# Patient Record
Sex: FEMALE | Race: WHITE | Employment: FULL TIME | URBAN - METROPOLITAN AREA
[De-identification: names, ages, dates, MRNs, and addresses within clinical notes are randomized per-mention and may not be internally consistent; named-entity substitution may affect disease eponyms.]

---

## 2017-01-13 RX ORDER — MECLIZINE HYDROCHLORIDE 25 MG/1
TABLET ORAL
Qty: 30 TABLET | Refills: 0 | Status: SHIPPED | OUTPATIENT
Start: 2017-01-13 | End: 2017-05-30

## 2017-01-13 NOTE — TELEPHONE ENCOUNTER
Received fax refill request for Meclizine   LOV: 6/13/16- [address] [facility] [address]  Last labs: 6/21/16  Last rx: 12/5/16- 30 with 0  Future Appointments  Date Time Provider Lluvia Irene   1/23/2017 9:00 AM Lia Gomez, DO [facility]     Pt has upcoming batsheva

## 2017-01-23 ENCOUNTER — OFFICE VISIT (OUTPATIENT)
Dept: INTERNAL MEDICINE CLINIC | Facility: CLINIC | Age: 72
End: 2017-01-23

## 2017-01-23 VITALS
TEMPERATURE: 98 F | BODY MASS INDEX: 19.14 KG/M2 | SYSTOLIC BLOOD PRESSURE: 102 MMHG | WEIGHT: 108 LBS | DIASTOLIC BLOOD PRESSURE: 58 MMHG | HEART RATE: 60 BPM | RESPIRATION RATE: 16 BRPM | HEIGHT: 62.8 IN

## 2017-01-23 DIAGNOSIS — I10 ESSENTIAL HYPERTENSION: Primary | ICD-10-CM

## 2017-01-23 DIAGNOSIS — E55.9 VITAMIN D DEFICIENCY: ICD-10-CM

## 2017-01-23 PROCEDURE — 99213 OFFICE O/P EST LOW 20 MIN: CPT | Performed by: FAMILY MEDICINE

## 2017-01-23 NOTE — PROGRESS NOTES
HPI:    Patient ID: Qasim Benites is a 74year old female. HPI Here for BP check. Patient has no complaints. Taking BP med as prescribed with no issues. Patient notes her BP is checked at Podiatrist as well and typically is 110s/70-80s.  No feelings Ear: Tympanic membrane, external ear and ear canal normal.   Mouth/Throat: Mucous membranes are normal. No posterior oropharyngeal erythema. Eyes: Conjunctivae are normal. Pupils are equal, round, and reactive to light. Neck: No thyromegaly present.

## 2017-01-23 NOTE — PATIENT INSTRUCTIONS
Step-by-Step  Checking Your Blood Pressure    Date Last Reviewed: 4/27/2016  © 9992-5583 [address], [facility] [employer]. All rights reserved.  This information is not intended as a substitute for professional medical

## 2017-01-26 ENCOUNTER — HOSPITAL ENCOUNTER (EMERGENCY)
Facility: HOSPITAL | Age: 72
Discharge: HOME | End: 2017-01-26
Attending: EMERGENCY MEDICINE
Payer: MEDICARE

## 2017-01-26 ENCOUNTER — APPOINTMENT (OUTPATIENT)
Dept: CT IMAGING | Facility: HOSPITAL | Age: 72
End: 2017-01-26
Attending: EMERGENCY MEDICINE
Payer: MEDICARE

## 2017-01-26 VITALS
SYSTOLIC BLOOD PRESSURE: 121 MMHG | HEART RATE: 81 BPM | OXYGEN SATURATION: 98 % | TEMPERATURE: 99 F | WEIGHT: 108 LBS | HEIGHT: 64 IN | BODY MASS INDEX: 18.44 KG/M2 | RESPIRATION RATE: 14 BRPM | DIASTOLIC BLOOD PRESSURE: 83 MMHG

## 2017-01-26 DIAGNOSIS — S09.90XA HEAD INJURY, INITIAL ENCOUNTER: ICD-10-CM

## 2017-01-26 DIAGNOSIS — R42 VERTIGO: Primary | ICD-10-CM

## 2017-01-26 LAB
ALBUMIN SERPL-MCNC: 3.7 G/DL (ref 3.5–4.8)
ALP LIVER SERPL-CCNC: 74 U/L (ref 55–142)
ALT SERPL-CCNC: 31 U/L (ref 14–54)
AST SERPL-CCNC: 28 U/L (ref 15–41)
ATRIAL RATE: 71 BPM
BASOPHILS # BLD AUTO: 0.08 X10(3) UL (ref 0–0.1)
BASOPHILS NFR BLD AUTO: 1.3 %
BILIRUB SERPL-MCNC: 0.3 MG/DL (ref 0.1–2)
BUN BLD-MCNC: 17 MG/DL (ref 8–20)
CALCIUM BLD-MCNC: 9 MG/DL (ref 8.3–10.3)
CHLORIDE: 107 MMOL/L (ref 101–111)
CO2: 24 MMOL/L (ref 22–32)
CREAT BLD-MCNC: 0.89 MG/DL (ref 0.55–1.02)
EOSINOPHIL # BLD AUTO: 0.12 X10(3) UL (ref 0–0.3)
EOSINOPHIL NFR BLD AUTO: 2 %
ERYTHROCYTE [DISTWIDTH] IN BLOOD BY AUTOMATED COUNT: 12.6 % (ref 11.5–16)
GLUCOSE BLD-MCNC: 129 MG/DL (ref 70–99)
HCT VFR BLD AUTO: 36.5 % (ref 34–50)
HGB BLD-MCNC: 12.3 G/DL (ref 12–16)
IMMATURE GRANULOCYTE COUNT: 0.01 X10(3) UL (ref 0–1)
IMMATURE GRANULOCYTE RATIO %: 0.2 %
LYMPHOCYTES # BLD AUTO: 1.3 X10(3) UL (ref 0.9–4)
LYMPHOCYTES NFR BLD AUTO: 21.1 %
M PROTEIN MFR SERPL ELPH: 7 G/DL (ref 6.1–8.3)
MCH RBC QN AUTO: 32.5 PG (ref 27–33.2)
MCHC RBC AUTO-ENTMCNC: 33.7 G/DL (ref 31–37)
MCV RBC AUTO: 96.6 FL (ref 81–100)
MONOCYTES # BLD AUTO: 0.42 X10(3) UL (ref 0.1–0.6)
MONOCYTES NFR BLD AUTO: 6.8 %
NEUTROPHIL ABS PRELIM: 4.22 X10 (3) UL (ref 1.3–6.7)
NEUTROPHILS # BLD AUTO: 4.22 X10(3) UL (ref 1.3–6.7)
NEUTROPHILS NFR BLD AUTO: 68.6 %
P AXIS: 57 DEGREES
P-R INTERVAL: 142 MS
PLATELET # BLD AUTO: 256 10(3)UL (ref 150–450)
POTASSIUM SERPL-SCNC: 4 MMOL/L (ref 3.6–5.1)
Q-T INTERVAL: 420 MS
QRS DURATION: 86 MS
QTC CALCULATION (BEZET): 456 MS
R AXIS: 50 DEGREES
RBC # BLD AUTO: 3.78 X10(6)UL (ref 3.8–5.1)
RED CELL DISTRIBUTION WIDTH-SD: 44.4 FL (ref 35.1–46.3)
SODIUM SERPL-SCNC: 141 MMOL/L (ref 136–144)
T AXIS: 21 DEGREES
VENTRICULAR RATE: 71 BPM
WBC # BLD AUTO: 6.2 X10(3) UL (ref 4–13)

## 2017-01-26 PROCEDURE — 99284 EMERGENCY DEPT VISIT MOD MDM: CPT

## 2017-01-26 PROCEDURE — 70450 CT HEAD/BRAIN W/O DYE: CPT

## 2017-01-26 PROCEDURE — 93005 ELECTROCARDIOGRAM TRACING: CPT

## 2017-01-26 PROCEDURE — 85025 COMPLETE CBC W/AUTO DIFF WBC: CPT | Performed by: EMERGENCY MEDICINE

## 2017-01-26 PROCEDURE — 93010 ELECTROCARDIOGRAM REPORT: CPT

## 2017-01-26 PROCEDURE — 36415 COLL VENOUS BLD VENIPUNCTURE: CPT

## 2017-01-26 PROCEDURE — 99285 EMERGENCY DEPT VISIT HI MDM: CPT

## 2017-01-26 PROCEDURE — 80053 COMPREHEN METABOLIC PANEL: CPT | Performed by: EMERGENCY MEDICINE

## 2017-01-26 RX ORDER — MECLIZINE HYDROCHLORIDE 25 MG/1
25 TABLET ORAL 3 TIMES DAILY PRN
Qty: 20 TABLET | Refills: 0 | Status: SHIPPED | OUTPATIENT
Start: 2017-01-26 | End: 2017-04-19

## 2017-01-26 NOTE — ED PROVIDER NOTES
Patient Seen in: [facility] Emergency Department    History   Patient presents with:  Contusion (musculoskeletal)    Stated Complaint: fell and hit head    HPI    This is a 10-year-old female who states that she left work early because of feeling di Oral Tab,  Take 1 tablet by mouth daily. Raloxifene HCl (EVISTA) 60 MG Oral Tab,  Take 1 tablet by mouth daily.        Family History   Problem Relation Age of Onset   • Diabetes, type 2 [Other] [OTHER] Father    • Lung cancer [Other] [OTHER] Mother    • And the patient is neurologically intact with no focal findings. The patient has a normal gait normal muscle strength normal sensory exam she is otherwise neurovascular intact cranial nerves grossly intact she has no facial droop.        ED Course     Labs instructions.       Disposition and Plan     Clinical Impression:  Vertigo  (primary encounter diagnosis)  Head injury, initial encounter    Disposition:  Discharge    Follow-up:  Richard Le, [address], [address]  [address] [place] [phone]

## 2017-01-26 NOTE — ED INITIAL ASSESSMENT (HPI)
PT STATES LEFT WORK EARLY TODAY DUE TO FEELING DIZZY. PT STATES TRIPPED OVER A CURB AND FELL STRIKING HER HEAD ON THE PAVEMENT. PT DENIES C/O SYNCOPE/LOC. PT HAS A H/O VERTIGO AND TAKES MECLIZINE.   PT HAS CONTUSION TO LEFT FOREHEAD, ALSO C/O PAIN TO RIG

## 2017-02-01 RX ORDER — LISINOPRIL 10 MG/1
10 TABLET ORAL
Qty: 90 TABLET | Refills: 1 | Status: SHIPPED | OUTPATIENT
Start: 2017-02-01 | End: 2017-05-30

## 2017-02-01 NOTE — TELEPHONE ENCOUNTER
Fax refill request received  LOV: 1/23/17- HTN  Last labs: 1/26/17- cmp/cbc  Last rx: 1/3/17- 30 with 0  No future appointments.   Per protocol to provider

## 2017-02-17 RX ORDER — OMEPRAZOLE 20 MG/1
20 CAPSULE, DELAYED RELEASE ORAL
Qty: 30 CAPSULE | Refills: 2 | Status: SHIPPED | OUTPATIENT
Start: 2017-02-17 | End: 2017-05-30

## 2017-02-17 NOTE — TELEPHONE ENCOUNTER
Received fax refill request from [facility] [address]: 1/23/17- HTN   Last labs: 1/26/17- cbc/cmp  Last rx: 11/11/160 30 with 2   Per protocol to provider  Pended as same   No future appointments.

## 2017-04-19 RX ORDER — MECLIZINE HYDROCHLORIDE 25 MG/1
25 TABLET ORAL 3 TIMES DAILY PRN
Qty: 20 TABLET | Refills: 0 | Status: SHIPPED | OUTPATIENT
Start: 2017-04-19 | End: 2017-05-30

## 2017-04-19 NOTE — TELEPHONE ENCOUNTER
LOV: 1/23/17 w/ AS  FOV: NFV  Last labs: 1/26/17 CMP,CBC  Last Refill: 1/26/17 qt:20  Per protocol sent to provider

## 2017-04-24 ENCOUNTER — TELEPHONE (OUTPATIENT)
Dept: INTERNAL MEDICINE CLINIC | Facility: CLINIC | Age: 72
End: 2017-04-24

## 2017-05-30 ENCOUNTER — OFFICE VISIT (OUTPATIENT)
Dept: INTERNAL MEDICINE CLINIC | Facility: CLINIC | Age: 72
End: 2017-05-30

## 2017-05-30 ENCOUNTER — APPOINTMENT (OUTPATIENT)
Dept: LAB | Age: 72
End: 2017-05-30
Attending: FAMILY MEDICINE
Payer: MEDICARE

## 2017-05-30 VITALS
DIASTOLIC BLOOD PRESSURE: 60 MMHG | TEMPERATURE: 99 F | RESPIRATION RATE: 18 BRPM | HEIGHT: 64 IN | SYSTOLIC BLOOD PRESSURE: 116 MMHG | BODY MASS INDEX: 18.44 KG/M2 | HEART RATE: 63 BPM | WEIGHT: 108 LBS

## 2017-05-30 DIAGNOSIS — I10 ESSENTIAL HYPERTENSION: ICD-10-CM

## 2017-05-30 DIAGNOSIS — E55.9 VITAMIN D DEFICIENCY: ICD-10-CM

## 2017-05-30 DIAGNOSIS — S90.412A ABRASION, LEFT GREAT TOE, INITIAL ENCOUNTER: Primary | ICD-10-CM

## 2017-05-30 PROCEDURE — 82306 VITAMIN D 25 HYDROXY: CPT

## 2017-05-30 PROCEDURE — 99214 OFFICE O/P EST MOD 30 MIN: CPT | Performed by: FAMILY MEDICINE

## 2017-05-30 PROCEDURE — 80053 COMPREHEN METABOLIC PANEL: CPT

## 2017-05-30 RX ORDER — LISINOPRIL 10 MG/1
10 TABLET ORAL
Qty: 90 TABLET | Refills: 1 | Status: SHIPPED | OUTPATIENT
Start: 2017-05-30 | End: 2017-11-26

## 2017-05-30 RX ORDER — RALOXIFENE HYDROCHLORIDE 60 MG/1
TABLET, FILM COATED ORAL
Qty: 90 TABLET | Refills: 3 | Status: SHIPPED | OUTPATIENT
Start: 2017-05-30 | End: 2018-04-23

## 2017-05-30 RX ORDER — MECLIZINE HYDROCHLORIDE 25 MG/1
25 TABLET ORAL 3 TIMES DAILY PRN
Qty: 20 TABLET | Refills: 0 | Status: SHIPPED | OUTPATIENT
Start: 2017-05-30 | End: 2017-07-24

## 2017-05-30 RX ORDER — OMEPRAZOLE 20 MG/1
20 CAPSULE, DELAYED RELEASE ORAL
Qty: 30 CAPSULE | Refills: 2 | Status: SHIPPED | OUTPATIENT
Start: 2017-05-30 | End: 2017-10-26

## 2017-05-30 NOTE — TELEPHONE ENCOUNTER
Last Office Visit: 1-23-17 with AMS for b/p   Last Rx Filled: 1-3-17 30g with no refills   Last Labs: 1-26-17 cbc/cmp  Future Appointment: 6-12-17     Per protocol to provider

## 2017-05-30 NOTE — PATIENT INSTRUCTIONS
Abrasions  Abrasions are skin scrapes. Their treatment depends on how large and deep the abrasion is. Home care  You may be prescribed an antibiotic cream or ointment to apply to the wound. This helps prevent infection.  Follow instructions when using th © 7938-1879 [address], [address]. All rights reserved. This information is not intended as a substitute for professional medical care. Always follow your healthcare professional's instructions.

## 2017-05-30 NOTE — PROGRESS NOTES
HPI:    Patient ID: Becca Helms is a 74year old female. HPI Here for f/u on wound she got at work 4 days ago. Patient slipped and fell and caught her left first toe on the edge of an escalator.  Had it cleaned at work and has been cleaning twice Abrasion, left great toe, initial encounter  (primary encounter diagnosis)  1. BP at goal. Continue current medication. Check labs. 2. Check labs. 3. Continue keeping clean and dry. Keep open while at home and covered while out.  Watch for signs of infe

## 2017-06-12 ENCOUNTER — OFFICE VISIT (OUTPATIENT)
Dept: INTERNAL MEDICINE CLINIC | Facility: CLINIC | Age: 72
End: 2017-06-12

## 2017-06-12 VITALS
RESPIRATION RATE: 16 BRPM | SYSTOLIC BLOOD PRESSURE: 126 MMHG | WEIGHT: 108 LBS | HEIGHT: 64 IN | DIASTOLIC BLOOD PRESSURE: 88 MMHG | HEART RATE: 68 BPM | BODY MASS INDEX: 18.44 KG/M2 | TEMPERATURE: 98 F

## 2017-06-12 DIAGNOSIS — Z12.31 VISIT FOR SCREENING MAMMOGRAM: ICD-10-CM

## 2017-06-12 DIAGNOSIS — Z12.11 SCREENING FOR MALIGNANT NEOPLASM OF COLON: Primary | ICD-10-CM

## 2017-06-12 DIAGNOSIS — Z00.00 ENCOUNTER FOR ANNUAL HEALTH EXAMINATION: ICD-10-CM

## 2017-06-12 DIAGNOSIS — I10 ESSENTIAL HYPERTENSION: ICD-10-CM

## 2017-06-12 DIAGNOSIS — E55.9 VITAMIN D DEFICIENCY: ICD-10-CM

## 2017-06-12 DIAGNOSIS — Z78.0 POSTMENOPAUSAL: ICD-10-CM

## 2017-06-12 DIAGNOSIS — M81.0 OSTEOPOROSIS WITHOUT CURRENT PATHOLOGICAL FRACTURE, UNSPECIFIED OSTEOPOROSIS TYPE: ICD-10-CM

## 2017-06-12 PROCEDURE — G0439 PPPS, SUBSEQ VISIT: HCPCS | Performed by: FAMILY MEDICINE

## 2017-06-12 NOTE — PATIENT INSTRUCTIONS
Liliana Mei's SCREENING SCHEDULE   Tests on this list are recommended by your physician but may not be covered, or covered at this frequency, by your insurer. Please check with your insurance carrier before scheduling to verify coverage.    PREVENT lifetime   • Anyone with a family history    Colorectal Cancer Screening  Covered up to Age 76     Colonoscopy Screen   Covered every 10 years- more often if abnormal Colonoscopy,10 Years due on 01/06/1993 Update Health Maintenance if applicable    Flex Si orders found for this or any previous visit.  Please get once after your 65th birthday    Pneumococcal 23 (Pneumovax)  Covered Once after 65   Orders placed or performed in visit on 06/13/16  -PNEUMOCOCCAL IMM (PNEUMOVAX)    Please get once after your 65th insurance carrier before scheduling to verify coverage.    PREVENTATIVE SERVICES  INDICATIONS AND SCHEDULE Internal Lab or Procedure External Lab or Procedure   Diabetes Screening      HbgA1C    At Least  Annually for Diabetics No results found for: A1C No due on 01/06/1993 Update Health Maintenance if applicable    Flex Sigmoidoscopy Screen  Covered every 5 years No results found for this or any previous visit. No flowsheet data found.      Fecal Occult Blood   Covered Annually No results found for: FOB, OCC 06/13/16  -PNEUMOCOCCAL IMM (PNEUMOVAX)    Please get once after your 65th birthday    Hepatitis B for Moderate/High Risk       No orders found for this or any previous visit.  Medium/high risk factors:   End-stage renal disease   Hemophiliacs who received

## 2017-06-12 NOTE — PROGRESS NOTES
HPI:   Israel Lewis is a 74year old female who presents for a Medicare Subsequent Annual Wellness visit (Pt already had Initial Annual Wellness). Here for follow-up. No complaints. Taking all meds as prescribed with no issues.      Annual Physic removal of ovarian cyst(s); colonoscopy; and tonsillectomy. Her family history includes Diabetes, type 2 in her father; Heart Disease in her mother; Lung cancer in her mother. SOCIAL HISTORY:   She  reports that she has never smoked.  She does not have bruit or JVD   Back:   Symmetric, no curvature, ROM normal, no CVA tenderness   Lungs:   Clear to auscultation bilaterally, respirations unlabored   Heart:  Regular rate and rhythm, S1 and S2 normal, no murmur, rub, or gallop   Abdomen:   Soft, non-tender, aspirin therapy.    Alberto Tolbert is unable to use daily aspirin therapy For the following reasons:   Patient decided that the risks of aspirin therapy outweigh the benefits and declines aspirin therapy          Diet assessment: good     Advanced Direc Problems?: No     Functional Status     Hearing Problems?: No    Vision Problems? : No    Difficulty walking?: No    Difficulty dressing or bathing?: No    Problems with daily activities? : No    Memory Problems?: No      Fall/Risk Assessment     Do you ha 06/21/2016 54     LDL CHOLESTROL (mg/dL)   Date Value   06/10/2014 50        EKG - w/ Initial Preventative Physical Exam only, or if medically necessary Electrocardiogram date01/26/2017       Colorectal Cancer Screening      Colonoscopy Screen every 10 y covered with a cut with metal- TD and TDaP Not covered by Medicare Part B No vaccine history found This may be covered with your prescription benefits, but Medicare does not cover unless Medically needed    Zoster  Not covered by Medicare Part B No vaccine

## 2017-06-19 ENCOUNTER — HOSPITAL ENCOUNTER (OUTPATIENT)
Dept: BONE DENSITY | Age: 72
Discharge: HOME | End: 2017-06-19
Attending: FAMILY MEDICINE
Payer: MEDICARE

## 2017-06-19 ENCOUNTER — HOSPITAL ENCOUNTER (OUTPATIENT)
Dept: MAMMOGRAPHY | Age: 72
Discharge: HOME | End: 2017-06-19
Attending: FAMILY MEDICINE
Payer: MEDICARE

## 2017-06-19 DIAGNOSIS — Z00.00 ENCOUNTER FOR ANNUAL HEALTH EXAMINATION: ICD-10-CM

## 2017-06-19 DIAGNOSIS — Z78.0 POSTMENOPAUSAL: ICD-10-CM

## 2017-06-19 DIAGNOSIS — Z12.31 VISIT FOR SCREENING MAMMOGRAM: ICD-10-CM

## 2017-06-19 PROCEDURE — 77080 DXA BONE DENSITY AXIAL: CPT | Performed by: FAMILY MEDICINE

## 2017-06-19 PROCEDURE — 77067 SCR MAMMO BI INCL CAD: CPT | Performed by: FAMILY MEDICINE

## 2017-07-12 ENCOUNTER — TELEPHONE (OUTPATIENT)
Dept: INTERNAL MEDICINE CLINIC | Facility: CLINIC | Age: 72
End: 2017-07-12

## 2017-07-14 NOTE — TELEPHONE ENCOUNTER
Please call patient and get more information as to what this is regarding and why I need to write a letter rather than the  wanting medical records release of those two visits since I was not the person patient saw when she went to the ER those times

## 2017-07-19 NOTE — TELEPHONE ENCOUNTER
Pt states that she was in ED on 1/9/15 and 6/19/15 both episodes were  related to falls at her condo/living appt. Pt states that Medicare is trying to get paid for ALL of Pt's visits with AMS  which are not related to falls.  Medicare is trying to claim th

## 2017-07-21 NOTE — TELEPHONE ENCOUNTER
Please print off all of my office visits from 1/21/15 through the present. Have patient sign medical release form so we can send those notes to the . Then go ahead and send notes (there should be 10 visits I think).  Please let patient know that there

## 2017-07-24 NOTE — TELEPHONE ENCOUNTER
Spoke to patient and informed that we will have office visits printed and she will need to sign a release form. Pt stated she will be in this morning.  I have printed records

## 2017-07-25 RX ORDER — MECLIZINE HYDROCHLORIDE 25 MG/1
TABLET ORAL
Qty: 20 TABLET | Refills: 0 | Status: SHIPPED | OUTPATIENT
Start: 2017-07-25 | End: 2017-09-07

## 2017-07-25 RX ORDER — DESONIDE 0.5 MG/ML
LOTION TOPICAL
Qty: 118 ML | Refills: 1 | Status: SHIPPED | OUTPATIENT
Start: 2017-07-25 | End: 2017-11-26

## 2017-07-25 NOTE — TELEPHONE ENCOUNTER
LOV: 6/12/17  Future office visit: no upcoming visit   Last labs: 5/20/17 Vit D Cmp 1/26/17 Cbc   Last RX: desonide 1/3/17 #118 mL #2 Refill    Meclizine 5/30/17 #20 No refills  Per protocol: Route to provider

## 2017-08-01 ENCOUNTER — TELEPHONE (OUTPATIENT)
Dept: INTERNAL MEDICINE CLINIC | Facility: CLINIC | Age: 72
End: 2017-08-01

## 2017-08-01 NOTE — TELEPHONE ENCOUNTER
Please call patient. Please explain that unfortunately since I was not involved in the care she had in those other ER visits, I cannot comment on them.  She will need to get records of those visits from medical records and that should be enough as far as wh

## 2017-08-01 NOTE — TELEPHONE ENCOUNTER
LM for patient informing her that [facility] is not able to write a letter in regards to the ER visits. I did let her know she could contact the hospital medical records and get the records for the visits in question.      I let patient know I would be out of the

## 2017-08-01 NOTE — TELEPHONE ENCOUNTER
AMS,   See note attached from patient. She is now requesting a letter to state that the ER trips in 2016 and 2017 are not related to to the falls which required visits in 2015. Please advise.

## 2017-09-07 RX ORDER — MECLIZINE HYDROCHLORIDE 25 MG/1
TABLET ORAL
Qty: 20 TABLET | Refills: 0 | Status: SHIPPED | OUTPATIENT
Start: 2017-09-07 | End: 2017-10-02

## 2017-09-07 NOTE — TELEPHONE ENCOUNTER
LOV: 6/12/17  Future office visit: No upcoming visit  Last labs: 5/30/17 Vit D Cmp 1/26/17 Cbc Cmp   Last RX: 7/25/17 #30 No Refills  Per protocol: Route to provider

## 2017-10-02 RX ORDER — MECLIZINE HYDROCHLORIDE 25 MG/1
TABLET ORAL
Qty: 20 TABLET | Refills: 0 | Status: SHIPPED | OUTPATIENT
Start: 2017-10-02 | End: 2018-01-22

## 2017-10-26 RX ORDER — MECLIZINE HYDROCHLORIDE 25 MG/1
TABLET ORAL
Qty: 20 TABLET | Refills: 0 | Status: SHIPPED | OUTPATIENT
Start: 2017-10-26 | End: 2017-11-26

## 2017-10-26 RX ORDER — OMEPRAZOLE 20 MG/1
CAPSULE, DELAYED RELEASE ORAL
Qty: 30 CAPSULE | Refills: 1 | Status: SHIPPED | OUTPATIENT
Start: 2017-10-26 | End: 2017-12-27

## 2017-10-26 NOTE — TELEPHONE ENCOUNTER
LOV-6/12/17  FOV-none  LAST RX-omeprazole 5/30/17 30 tabs 2 refills meclizine 10/02/17 20 tabs 0 refills  LAST LABS-5/30/17  PER PROTOCOL-to provider

## 2017-11-27 RX ORDER — DESONIDE 0.5 MG/ML
LOTION TOPICAL
Qty: 118 ML | Refills: 0 | Status: SHIPPED | OUTPATIENT
Start: 2017-11-27 | End: 2018-04-23

## 2017-11-27 RX ORDER — LISINOPRIL 10 MG/1
TABLET ORAL
Qty: 90 TABLET | Refills: 0 | Status: SHIPPED | OUTPATIENT
Start: 2017-11-27 | End: 2018-04-23

## 2017-11-27 RX ORDER — MECLIZINE HYDROCHLORIDE 25 MG/1
TABLET ORAL
Qty: 20 TABLET | Refills: 0 | Status: SHIPPED | OUTPATIENT
Start: 2017-11-27 | End: 2018-01-25

## 2017-11-27 NOTE — TELEPHONE ENCOUNTER
Medication(s) to Refill:   Pending Prescriptions Disp Refills    LISINOPRIL 10 MG Oral Tab [Pharmacy Med Name: Lisinopril Oral Tablet 10 MG] 90 tablet 0     Sig: TAKE ONE TABLET BY MOUTH ONE TIME DAILY       DESONIDE 0.05 % External Lotion [Pharmacy Med Na

## 2017-12-27 RX ORDER — OMEPRAZOLE 20 MG/1
CAPSULE, DELAYED RELEASE ORAL
Qty: 30 CAPSULE | Refills: 5 | Status: SHIPPED | OUTPATIENT
Start: 2017-12-27 | End: 2018-04-23

## 2017-12-27 NOTE — TELEPHONE ENCOUNTER
E request  Medication(s) to Refill:   Pending Prescriptions Disp Refills    OMEPRAZOLE 20 MG Oral Capsule Delayed Release [Pharmacy Med Name: Omeprazole Oral Capsule Delayed Release 20 MG] 30 capsule 0     Sig: take 1 capsule by mouth every morning before

## 2018-01-22 ENCOUNTER — OFFICE VISIT (OUTPATIENT)
Dept: INTERNAL MEDICINE CLINIC | Facility: CLINIC | Age: 73
End: 2018-01-22

## 2018-01-22 VITALS
HEART RATE: 70 BPM | DIASTOLIC BLOOD PRESSURE: 60 MMHG | TEMPERATURE: 98 F | SYSTOLIC BLOOD PRESSURE: 110 MMHG | BODY MASS INDEX: 18.61 KG/M2 | WEIGHT: 109 LBS | HEIGHT: 64 IN | RESPIRATION RATE: 16 BRPM

## 2018-01-22 DIAGNOSIS — M81.0 OSTEOPOROSIS WITHOUT CURRENT PATHOLOGICAL FRACTURE, UNSPECIFIED OSTEOPOROSIS TYPE: ICD-10-CM

## 2018-01-22 DIAGNOSIS — R42 VERTIGO: ICD-10-CM

## 2018-01-22 DIAGNOSIS — Z12.11 SCREENING FOR MALIGNANT NEOPLASM OF COLON: ICD-10-CM

## 2018-01-22 DIAGNOSIS — I10 ESSENTIAL HYPERTENSION: Primary | ICD-10-CM

## 2018-01-22 DIAGNOSIS — E55.9 VITAMIN D DEFICIENCY: ICD-10-CM

## 2018-01-22 PROCEDURE — 99214 OFFICE O/P EST MOD 30 MIN: CPT | Performed by: FAMILY MEDICINE

## 2018-01-22 NOTE — PROGRESS NOTES
HPI:    Patient ID: Lavell Bass is a 74year old female. HPI Here for f/u. Patient has no complaints. Taking Lisinopril as prescribed with no issues. Doesn't regularly check BP but no headaches or lightheadedness.    Has periodic vertigo still a Referral given for colonoscopy. Encouraged patient to schedule. 3. Reviewed previous labs. Recheck with yearly labs. Continue vitamin D.   4. Stable. Continue Raloxifene. 5. Symptom controlled with meclizine. Continue as needed.        No orders of the

## 2018-01-26 RX ORDER — MECLIZINE HYDROCHLORIDE 25 MG/1
TABLET ORAL
Qty: 20 TABLET | Refills: 2 | Status: SHIPPED | OUTPATIENT
Start: 2018-01-26 | End: 2018-04-23

## 2018-01-26 NOTE — TELEPHONE ENCOUNTER
E request  Medication(s) to Refill:   Pending Prescriptions Disp Refills    MECLIZINE HCL 25 MG Oral Tab [Pharmacy Med Name: Meclizine HCl Oral Tablet 25 MG] 20 tablet 0     Sig: TAKE ONE TABLET BY MOUTH THREE TIMES DAILY AS NEEDED            Last Time Med

## 2018-02-12 ENCOUNTER — TELEPHONE (OUTPATIENT)
Dept: INTERNAL MEDICINE CLINIC | Facility: CLINIC | Age: 73
End: 2018-02-12

## 2018-02-12 NOTE — TELEPHONE ENCOUNTER
Patient notified to go online, fill out form, bring in to our office and we send out for the kit. Pt verbalizes understanding.

## 2018-02-12 NOTE — TELEPHONE ENCOUNTER
She can go online on [employer] and fill out an order form- she fills this out and brings it in to our office and we send it in for the kit.

## 2018-02-12 NOTE — TELEPHONE ENCOUNTER
Patient heard that their is an alternative test to the colonoscopy other than the mail in card. She heard from a friend that there is a cone that you fit on your toliet and collects your stool and she is wondering if that would be available?   Does she nee

## 2018-02-19 ENCOUNTER — TELEPHONE (OUTPATIENT)
Dept: INTERNAL MEDICINE CLINIC | Facility: CLINIC | Age: 73
End: 2018-02-19

## 2018-02-19 NOTE — TELEPHONE ENCOUNTER
Received Pre-Op Paperwork Clearance. Faxed our Pre-Op Clearance Request to Dr. Virgen Tee office at [phone]. Paperwork in AMS folder.

## 2018-02-23 NOTE — TELEPHONE ENCOUNTER
Please call patient. She needs pre-op appt with me scheduled sometime between 4/16 and 5/7. No labs or EKG needed.

## 2018-02-27 NOTE — TELEPHONE ENCOUNTER
Patient is scheduled for Pre-Op.   Future Appointments  Date Time Provider Lluvia Maria Victoria   4/23/2018 9:00 AM Dhiraj Rocha DO [facility]   5/7/2018 9:00 AM Desean Juan MD [facility] SUB GI   6/18/2018 9:00 AM Dhiraj Rocha DO EMG 35 75

## 2018-04-11 ENCOUNTER — HOSPITAL ENCOUNTER (EMERGENCY)
Facility: HOSPITAL | Age: 73
Discharge: HOME | End: 2018-04-11
Attending: EMERGENCY MEDICINE
Payer: MEDICARE

## 2018-04-11 VITALS
BODY MASS INDEX: 17.93 KG/M2 | RESPIRATION RATE: 18 BRPM | HEART RATE: 69 BPM | HEIGHT: 64 IN | WEIGHT: 105 LBS | SYSTOLIC BLOOD PRESSURE: 133 MMHG | DIASTOLIC BLOOD PRESSURE: 82 MMHG | TEMPERATURE: 98 F | OXYGEN SATURATION: 100 %

## 2018-04-11 DIAGNOSIS — T18.108A FOREIGN BODY IN ESOPHAGUS, INITIAL ENCOUNTER: Primary | ICD-10-CM

## 2018-04-11 PROCEDURE — 96376 TX/PRO/DX INJ SAME DRUG ADON: CPT

## 2018-04-11 PROCEDURE — 96374 THER/PROPH/DIAG INJ IV PUSH: CPT

## 2018-04-11 PROCEDURE — 99284 EMERGENCY DEPT VISIT MOD MDM: CPT

## 2018-04-11 PROCEDURE — 96375 TX/PRO/DX INJ NEW DRUG ADDON: CPT

## 2018-04-11 RX ORDER — ONDANSETRON 2 MG/ML
4 INJECTION INTRAMUSCULAR; INTRAVENOUS EVERY 4 HOURS PRN
Status: DISCONTINUED | OUTPATIENT
Start: 2018-04-11 | End: 2018-04-11

## 2018-04-11 RX ORDER — MAGNESIUM HYDROXIDE/ALUMINUM HYDROXICE/SIMETHICONE 120; 1200; 1200 MG/30ML; MG/30ML; MG/30ML
30 SUSPENSION ORAL ONCE
Status: COMPLETED | OUTPATIENT
Start: 2018-04-11 | End: 2018-04-11

## 2018-04-12 NOTE — ED PROVIDER NOTES
Patient Seen in: [facility] Emergency Department    History   Patient presents with:  FB in Throat (GI, respiratory)    Stated Complaint: Foreign Body     HPI    Patient was eating an Asian chicken salad with peapod and feels that a peapod has been Abdomen: Positive bowel sounds,  soft, no tenderness, no distension, no masses, no organomegaly, no rebound, no guarding   Extremities: No cyanosis, no clubbing, no edema   Musculoskeletal: No tenderness, swelling, deformity   Skin: No significant lesion

## 2018-04-12 NOTE — ED NOTES
Patient updated with plan of care. Pt able to drink full glass of water without difficulty. Reports improvement of discomfort in throat. MD aware.

## 2018-04-12 NOTE — ED NOTES
Patient updated with plan of care. Pt given marysol ortiz per MD verbal order. Pt instructed to try to eat solid food. Pt verbalized understanding.

## 2018-04-12 NOTE — ED NOTES
Patient updated with plan of care. Pt able to eat crackers and drink more water without difficulty. Denies discomfort at this time.

## 2018-04-23 ENCOUNTER — OFFICE VISIT (OUTPATIENT)
Dept: INTERNAL MEDICINE CLINIC | Facility: CLINIC | Age: 73
End: 2018-04-23

## 2018-04-23 VITALS
BODY MASS INDEX: 19.56 KG/M2 | TEMPERATURE: 98 F | SYSTOLIC BLOOD PRESSURE: 100 MMHG | DIASTOLIC BLOOD PRESSURE: 60 MMHG | WEIGHT: 109 LBS | RESPIRATION RATE: 16 BRPM | HEART RATE: 70 BPM | HEIGHT: 62.6 IN

## 2018-04-23 DIAGNOSIS — Z12.11 SCREENING FOR MALIGNANT NEOPLASM OF COLON: ICD-10-CM

## 2018-04-23 DIAGNOSIS — L98.9 SKIN SORE: Primary | ICD-10-CM

## 2018-04-23 PROCEDURE — 99213 OFFICE O/P EST LOW 20 MIN: CPT | Performed by: FAMILY MEDICINE

## 2018-04-23 RX ORDER — RALOXIFENE HYDROCHLORIDE 60 MG/1
TABLET, FILM COATED ORAL
Qty: 90 TABLET | Refills: 3 | Status: SHIPPED | OUTPATIENT
Start: 2018-04-23 | End: 2019-06-10

## 2018-04-23 RX ORDER — MECLIZINE HYDROCHLORIDE 25 MG/1
TABLET ORAL
Qty: 20 TABLET | Refills: 2 | Status: SHIPPED | OUTPATIENT
Start: 2018-04-23 | End: 2018-06-18

## 2018-04-23 RX ORDER — LISINOPRIL 10 MG/1
10 TABLET ORAL
Qty: 90 TABLET | Refills: 0 | Status: SHIPPED | OUTPATIENT
Start: 2018-04-23 | End: 2018-06-18

## 2018-04-23 RX ORDER — OMEPRAZOLE 20 MG/1
CAPSULE, DELAYED RELEASE ORAL
Qty: 30 CAPSULE | Refills: 5 | Status: SHIPPED | OUTPATIENT
Start: 2018-04-23 | End: 2018-06-18

## 2018-04-23 RX ORDER — DESONIDE 0.5 MG/ML
LOTION TOPICAL
Qty: 118 ML | Refills: 0 | Status: SHIPPED | OUTPATIENT
Start: 2018-04-23 | End: 2018-06-18

## 2018-04-23 NOTE — PROGRESS NOTES
HPI:    Patient ID: Israel Lewis is a 74year old female. HPI Here with c/o sore that doesn't heal at right ear where it connects to her head near the ear lobe. Has periodically and then gets better. Puts lotion and neosporin on this.    Cancelled Skin sore  (primary encounter diagnosis)  1. Patient agrees to Fit testing. Envelope and instructions given. 2. Rx ointment morning and noon and hydrocortisone cream at night. Return if worsening or persists after one week of creams.      Orders Placed Th

## 2018-05-07 ENCOUNTER — HOSPITAL ENCOUNTER (EMERGENCY)
Facility: HOSPITAL | Age: 73
Discharge: HOME | End: 2018-05-07
Attending: EMERGENCY MEDICINE
Payer: MEDICARE

## 2018-05-07 VITALS
DIASTOLIC BLOOD PRESSURE: 77 MMHG | HEART RATE: 70 BPM | HEIGHT: 63 IN | TEMPERATURE: 98 F | BODY MASS INDEX: 18.61 KG/M2 | WEIGHT: 105 LBS | OXYGEN SATURATION: 98 % | SYSTOLIC BLOOD PRESSURE: 126 MMHG | RESPIRATION RATE: 18 BRPM

## 2018-05-07 DIAGNOSIS — R19.8 GLOBUS SENSATION: Primary | ICD-10-CM

## 2018-05-07 PROCEDURE — 99284 EMERGENCY DEPT VISIT MOD MDM: CPT

## 2018-05-07 PROCEDURE — 96374 THER/PROPH/DIAG INJ IV PUSH: CPT

## 2018-05-07 RX ORDER — MAGNESIUM HYDROXIDE/ALUMINUM HYDROXICE/SIMETHICONE 120; 1200; 1200 MG/30ML; MG/30ML; MG/30ML
30 SUSPENSION ORAL ONCE
Status: COMPLETED | OUTPATIENT
Start: 2018-05-07 | End: 2018-05-07

## 2018-05-07 NOTE — ED PROVIDER NOTES
Patient Seen in: [facility] Emergency Department    History   Patient presents with:  FB in Throat (GI, respiratory)    Stated Complaint: chicken stuck in throat    HPI    Patient is a 25-year-old female who presents stating she feels like she has a Eyes: Conjunctivae and EOM are normal. Pupils are equal, round, and reactive to light. Neck: Normal range of motion. Neck supple. Cardiovascular: Normal rate, regular rhythm and normal heart sounds.     Pulmonary/Chest: Effort normal and breath sounds

## 2018-05-07 NOTE — ED INITIAL ASSESSMENT (HPI)
Patient was eating lunch at approximately 1500 today when she felt a peapod lodge in her throat and she has been unable pass it down. Patient denies difficulty breathing. Denies nausea/vomiting. Patient relates pain with swallowing.  Patient in no acute dis

## 2018-06-18 ENCOUNTER — OFFICE VISIT (OUTPATIENT)
Dept: INTERNAL MEDICINE CLINIC | Facility: CLINIC | Age: 73
End: 2018-06-18

## 2018-06-18 VITALS
BODY MASS INDEX: 19.2 KG/M2 | SYSTOLIC BLOOD PRESSURE: 110 MMHG | HEIGHT: 62.5 IN | DIASTOLIC BLOOD PRESSURE: 62 MMHG | TEMPERATURE: 98 F | RESPIRATION RATE: 15 BRPM | WEIGHT: 107 LBS | HEART RATE: 60 BPM

## 2018-06-18 DIAGNOSIS — K21.9 GASTROESOPHAGEAL REFLUX DISEASE WITHOUT ESOPHAGITIS: ICD-10-CM

## 2018-06-18 DIAGNOSIS — R42 VERTIGO: ICD-10-CM

## 2018-06-18 DIAGNOSIS — I10 ESSENTIAL HYPERTENSION: ICD-10-CM

## 2018-06-18 DIAGNOSIS — E55.9 VITAMIN D DEFICIENCY: ICD-10-CM

## 2018-06-18 DIAGNOSIS — Z12.39 SCREENING FOR BREAST CANCER: ICD-10-CM

## 2018-06-18 DIAGNOSIS — M81.6 LOCALIZED OSTEOPOROSIS WITHOUT CURRENT PATHOLOGICAL FRACTURE: ICD-10-CM

## 2018-06-18 DIAGNOSIS — Z00.00 ENCOUNTER FOR ANNUAL HEALTH EXAMINATION: Primary | ICD-10-CM

## 2018-06-18 PROCEDURE — G0439 PPPS, SUBSEQ VISIT: HCPCS | Performed by: FAMILY MEDICINE

## 2018-06-18 RX ORDER — DESONIDE 0.5 MG/ML
LOTION TOPICAL
Qty: 118 ML | Refills: 0 | Status: SHIPPED | OUTPATIENT
Start: 2018-06-18 | End: 2018-09-04

## 2018-06-18 RX ORDER — MECLIZINE HYDROCHLORIDE 25 MG/1
TABLET ORAL
Qty: 20 TABLET | Refills: 2 | Status: SHIPPED | OUTPATIENT
Start: 2018-06-18 | End: 2019-02-11

## 2018-06-18 RX ORDER — LISINOPRIL 10 MG/1
10 TABLET ORAL
Qty: 90 TABLET | Refills: 1 | Status: SHIPPED | OUTPATIENT
Start: 2018-06-18 | End: 2019-06-10

## 2018-06-18 RX ORDER — OMEPRAZOLE 20 MG/1
CAPSULE, DELAYED RELEASE ORAL
Qty: 90 CAPSULE | Refills: 1 | Status: SHIPPED | OUTPATIENT
Start: 2018-06-18 | End: 2018-12-17

## 2018-06-18 NOTE — PATIENT INSTRUCTIONS
Kelli Mei's SCREENING SCHEDULE   Tests on this list are recommended by your physician but may not be covered, or covered at this frequency, by your insurer. Please check with your insurance carrier before scheduling to verify coverage.    PREVENT • Men who are 73-68 years old and have smoked more than 100 cigarettes in their lifetime   • Anyone with a family history    Colorectal Cancer Screening  Covered up to Age 76     Colonoscopy Screen   Covered every 10 years- more often if abnormal Colonos found for this or any previous visit. Please get every year    Pneumococcal 13 (Prevnar)  Covered Once after 65 No orders found for this or any previous visit.  Please get once after your 65th birthday    Pneumococcal 23 (Pneumovax)  Covered Once after 65

## 2018-06-18 NOTE — PROGRESS NOTES
HPI:   Syd Leon is a 74year old female who presents for a Medicare Subsequent Annual Wellness visit (Pt already had Initial Annual Wellness). Here for annual check-up. Patient has no complaints.      Essential hypertension- taking BP med as Girish Dangelo was screened for Alcohol abuse and had a score of 0 so is at low risk.     Patient Care Team: Patient Care Team:  Dhiraj Rocha DO as PCP - General    Patient Active Problem List:     Osteoporosis     Vitamin D deficiency     Johanna surgical history that includes removal of ovarian cyst(s); colonoscopy; and tonsillectomy. Her family history includes Acid reflux in an other family member; Diabetes, type 2 in her father; Heart Disease in her mother; Lung cancer in her mother.    Citlaly Potts Soft, nontender, nondistended. Positive bowel sounds. No rebound tenderness  Neurologic: No focal neurological deficits. Musculoskeletal: Full range of motion of all extremities. No swelling noted. Integument: No lesions. No erythema.   Psychiatric: Tristen healthy diet, lifestyle, and exercise. Prescription medication ordered. Lab work ordered. Return in 6 months (on 12/18/2018).      Shruthi Casas DO, 6/18/2018     General Health     In the past six months, have you lost more than 10 pounds without tr Pap: Every 3 yrs age 21-65 or Pap+HPV every 5 yrs age 33-67, age 72 and older at high risk Pap Smear,1 Year due on 02/22/2012 Update Health Maintenance if applicable    Chlamydia  Annually if high risk No results found for: CHLAMYDIA No flowsheet data 05/30/2017 0.70    No flowsheet data found. Drug Serum Conc  Annually No results found for: DIGOXIN, DIG, VALP No flowsheet data found.                Template: [facility] MEDICARE ANNUAL ASSESSMENT FEMALE Kathia Chavez [[id number]]

## 2018-06-25 ENCOUNTER — TELEPHONE (OUTPATIENT)
Dept: INTERNAL MEDICINE CLINIC | Facility: CLINIC | Age: 73
End: 2018-06-25

## 2018-06-25 NOTE — TELEPHONE ENCOUNTER
Patient is looking for a hand specialist/ She is wanting to know who [facility]MS would recommend. She was given a name of someone in [place] years ago but can not remember who it is.

## 2018-06-25 NOTE — TELEPHONE ENCOUNTER
Patient notified to try icing her hand, can take Tylenol according to package directions and call back if pain does not resolve. Pt verbalizes understanding.

## 2018-06-25 NOTE — TELEPHONE ENCOUNTER
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   Pt too busy to come

## 2018-07-23 ENCOUNTER — HOSPITAL ENCOUNTER (OUTPATIENT)
Dept: MAMMOGRAPHY | Age: 73
Discharge: HOME | End: 2018-07-23
Attending: FAMILY MEDICINE
Payer: MEDICARE

## 2018-07-23 ENCOUNTER — LABORATORY ENCOUNTER (OUTPATIENT)
Dept: LAB | Age: 73
End: 2018-07-23
Attending: FAMILY MEDICINE
Payer: MEDICARE

## 2018-07-23 DIAGNOSIS — Z12.39 SCREENING FOR BREAST CANCER: ICD-10-CM

## 2018-07-23 DIAGNOSIS — I10 ESSENTIAL HYPERTENSION: ICD-10-CM

## 2018-07-23 DIAGNOSIS — E55.9 VITAMIN D DEFICIENCY: ICD-10-CM

## 2018-07-23 LAB
ALBUMIN SERPL-MCNC: 3.6 G/DL (ref 3.5–4.8)
ALBUMIN/GLOB SERPL: 1 {RATIO} (ref 1–2)
ALP LIVER SERPL-CCNC: 69 U/L (ref 55–142)
ALT SERPL-CCNC: 32 U/L (ref 14–54)
ANION GAP SERPL CALC-SCNC: 6 MMOL/L (ref 0–18)
AST SERPL-CCNC: 29 U/L (ref 15–41)
BASOPHILS # BLD AUTO: 0.08 X10(3) UL (ref 0–0.1)
BASOPHILS NFR BLD AUTO: 1.3 %
BILIRUB SERPL-MCNC: 0.4 MG/DL (ref 0.1–2)
BUN BLD-MCNC: 20 MG/DL (ref 8–20)
BUN/CREAT SERPL: 28.6 (ref 10–20)
CALCIUM BLD-MCNC: 9.5 MG/DL (ref 8.3–10.3)
CHLORIDE SERPL-SCNC: 109 MMOL/L (ref 101–111)
CO2 SERPL-SCNC: 26 MMOL/L (ref 22–32)
CREAT BLD-MCNC: 0.7 MG/DL (ref 0.55–1.02)
EOSINOPHIL # BLD AUTO: 0.26 X10(3) UL (ref 0–0.3)
EOSINOPHIL NFR BLD AUTO: 4.3 %
ERYTHROCYTE [DISTWIDTH] IN BLOOD BY AUTOMATED COUNT: 13 % (ref 11.5–16)
GLOBULIN PLAS-MCNC: 3.5 G/DL (ref 2.5–3.7)
GLUCOSE BLD-MCNC: 87 MG/DL (ref 70–99)
HCT VFR BLD AUTO: 39.3 % (ref 34–50)
HGB BLD-MCNC: 12.4 G/DL (ref 12–16)
IMMATURE GRANULOCYTE COUNT: 0.02 X10(3) UL (ref 0–1)
IMMATURE GRANULOCYTE RATIO %: 0.3 %
LYMPHOCYTES # BLD AUTO: 1.57 X10(3) UL (ref 0.9–4)
LYMPHOCYTES NFR BLD AUTO: 26.1 %
M PROTEIN MFR SERPL ELPH: 7.1 G/DL (ref 6.1–8.3)
MCH RBC QN AUTO: 31.6 PG (ref 27–33.2)
MCHC RBC AUTO-ENTMCNC: 31.6 G/DL (ref 31–37)
MCV RBC AUTO: 100 FL (ref 81–100)
MONOCYTES # BLD AUTO: 0.61 X10(3) UL (ref 0.1–1)
MONOCYTES NFR BLD AUTO: 10.1 %
NEUTROPHIL ABS PRELIM: 3.48 X10 (3) UL (ref 1.3–6.7)
NEUTROPHILS # BLD AUTO: 3.48 X10(3) UL (ref 1.3–6.7)
NEUTROPHILS NFR BLD AUTO: 57.9 %
OSMOLALITY SERPL CALC.SUM OF ELEC: 294 MOSM/KG (ref 275–295)
PLATELET # BLD AUTO: 303 10(3)UL (ref 150–450)
POTASSIUM SERPL-SCNC: 4.2 MMOL/L (ref 3.6–5.1)
RBC # BLD AUTO: 3.93 X10(6)UL (ref 3.8–5.1)
RED CELL DISTRIBUTION WIDTH-SD: 48.5 FL (ref 35.1–46.3)
SODIUM SERPL-SCNC: 141 MMOL/L (ref 136–144)
TSI SER-ACNC: 2.61 MIU/ML (ref 0.35–5.5)
VIT D+METAB SERPL-MCNC: 21.9 NG/ML (ref 30–100)
WBC # BLD AUTO: 6 X10(3) UL (ref 4–13)

## 2018-07-23 PROCEDURE — 77067 SCR MAMMO BI INCL CAD: CPT | Performed by: FAMILY MEDICINE

## 2018-07-23 PROCEDURE — 84443 ASSAY THYROID STIM HORMONE: CPT

## 2018-07-23 PROCEDURE — 36415 COLL VENOUS BLD VENIPUNCTURE: CPT

## 2018-07-23 PROCEDURE — 77063 BREAST TOMOSYNTHESIS BI: CPT | Performed by: FAMILY MEDICINE

## 2018-07-23 PROCEDURE — 85025 COMPLETE CBC W/AUTO DIFF WBC: CPT

## 2018-07-23 PROCEDURE — 80053 COMPREHEN METABOLIC PANEL: CPT

## 2018-07-23 PROCEDURE — 82306 VITAMIN D 25 HYDROXY: CPT

## 2018-09-04 DIAGNOSIS — Z00.00 ENCOUNTER FOR ANNUAL HEALTH EXAMINATION: ICD-10-CM

## 2018-09-05 RX ORDER — DESONIDE 0.5 MG/ML
LOTION TOPICAL
Qty: 59 ML | Refills: 0 | Status: SHIPPED | OUTPATIENT
Start: 2018-09-05 | End: 2018-11-13

## 2018-09-05 NOTE — TELEPHONE ENCOUNTER
E request  Medication(s) to Refill:   Pending Prescriptions Disp Refills    DESONIDE 0.05 % External Lotion [Pharmacy Med Name: Desonide External Lotion 0.05 %] 59 mL 0     Sig: APPLY TOPICALLY DAILY AS NEEDED AS DIRECTED              Reason for Medication

## 2018-10-08 ENCOUNTER — PRIOR ORIGINAL RECORDS (OUTPATIENT)
Dept: OTHER | Age: 73
End: 2018-10-08

## 2018-11-13 ENCOUNTER — MYAURORA ACCOUNT LINK (OUTPATIENT)
Dept: OTHER | Age: 73
End: 2018-11-13

## 2018-11-13 DIAGNOSIS — Z00.00 ENCOUNTER FOR ANNUAL HEALTH EXAMINATION: ICD-10-CM

## 2018-11-14 RX ORDER — DESONIDE 0.5 MG/ML
LOTION TOPICAL
Qty: 59 ML | Refills: 0 | Status: SHIPPED | OUTPATIENT
Start: 2018-11-14 | End: 2018-12-27

## 2018-11-14 NOTE — TELEPHONE ENCOUNTER
E request  LOV: 6/18/18- medicare wellness visit  Last rx: 9/5/18- 59 mL with 0 refills  No future appointments.

## 2018-12-17 DIAGNOSIS — K21.9 GASTROESOPHAGEAL REFLUX DISEASE WITHOUT ESOPHAGITIS: ICD-10-CM

## 2018-12-18 ENCOUNTER — TELEPHONE (OUTPATIENT)
Dept: INTERNAL MEDICINE CLINIC | Facility: CLINIC | Age: 73
End: 2018-12-18

## 2018-12-18 DIAGNOSIS — E55.9 VITAMIN D DEFICIENCY: Primary | ICD-10-CM

## 2018-12-18 RX ORDER — OMEPRAZOLE 20 MG/1
CAPSULE, DELAYED RELEASE ORAL
Qty: 90 CAPSULE | Refills: 0 | Status: SHIPPED | OUTPATIENT
Start: 2018-12-18 | End: 2019-03-05

## 2018-12-18 NOTE — TELEPHONE ENCOUNTER
E request  LOV: 6/18/18- medicare wellness visit  Last labs: 7/23/18  Last rx: 6/18/18- 90 capsules with 1 refill  No future appointments.

## 2018-12-18 NOTE — TELEPHONE ENCOUNTER
Medicare Wellness sched on   Future Appointments   Date Time Provider Lluvia Irene   1/14/2019  9:00 AM Mayda Hint, DO [facility]     Pt stated that she recently had bw completed in July.  She was wondering if she still need to get th

## 2018-12-21 NOTE — TELEPHONE ENCOUNTER
CBC, TSH, Vit D (21.9) and CMP done 7/23/18- labs stable  AMS- please advise labs to reorder for CPE on 1/14/19 for AWV. Thank you.

## 2018-12-26 DIAGNOSIS — Z00.00 ENCOUNTER FOR ANNUAL HEALTH EXAMINATION: ICD-10-CM

## 2018-12-27 DIAGNOSIS — Z00.00 ENCOUNTER FOR ANNUAL HEALTH EXAMINATION: ICD-10-CM

## 2018-12-27 RX ORDER — DESONIDE 0.5 MG/ML
LOTION TOPICAL
Qty: 59 ML | Refills: 0 | Status: SHIPPED | OUTPATIENT
Start: 2018-12-27 | End: 2019-01-14

## 2018-12-27 RX ORDER — DESONIDE 0.5 MG/ML
LOTION TOPICAL
Qty: 59 ML | Refills: 0 | Status: SHIPPED | OUTPATIENT
Start: 2018-12-27 | End: 2019-01-03

## 2018-12-27 NOTE — TELEPHONE ENCOUNTER
Pt called and wants to make sure she can get 2 bottles because she puts it on her hands and feet. Please send another bottle to the pharmacy.

## 2018-12-27 NOTE — TELEPHONE ENCOUNTER
E request  LOV: 6/18/18   Last labs: 7/23/18  Last rx: 11/14/18- 59 mL with 0 refills  Future Appointments   Date Time Provider Lluvia Irene   1/14/2019  9:00 AM Fausto Oscar DO [facility]     Per protocol to provider

## 2019-01-03 DIAGNOSIS — Z00.00 ENCOUNTER FOR ANNUAL HEALTH EXAMINATION: ICD-10-CM

## 2019-01-03 RX ORDER — DESONIDE 0.5 MG/ML
LOTION TOPICAL
Qty: 118 ML | Refills: 0 | Status: SHIPPED | OUTPATIENT
Start: 2019-01-03 | End: 2019-01-14

## 2019-01-03 NOTE — TELEPHONE ENCOUNTER
Fax received from [facility] indicating that the patient requesting 2 bottles 118ml we sent one bottle, pended, please sign if appropriate.

## 2019-01-14 ENCOUNTER — APPOINTMENT (OUTPATIENT)
Dept: LAB | Age: 74
End: 2019-01-14
Attending: FAMILY MEDICINE
Payer: MEDICARE

## 2019-01-14 ENCOUNTER — OFFICE VISIT (OUTPATIENT)
Dept: INTERNAL MEDICINE CLINIC | Facility: CLINIC | Age: 74
End: 2019-01-14
Payer: MEDICARE

## 2019-01-14 VITALS
DIASTOLIC BLOOD PRESSURE: 72 MMHG | SYSTOLIC BLOOD PRESSURE: 118 MMHG | HEART RATE: 77 BPM | WEIGHT: 108 LBS | BODY MASS INDEX: 19 KG/M2 | TEMPERATURE: 98 F | RESPIRATION RATE: 16 BRPM

## 2019-01-14 DIAGNOSIS — M81.6 LOCALIZED OSTEOPOROSIS WITHOUT CURRENT PATHOLOGICAL FRACTURE: ICD-10-CM

## 2019-01-14 DIAGNOSIS — E55.9 VITAMIN D DEFICIENCY: ICD-10-CM

## 2019-01-14 DIAGNOSIS — Z00.00 ENCOUNTER FOR ANNUAL HEALTH EXAMINATION: Primary | ICD-10-CM

## 2019-01-14 DIAGNOSIS — K21.9 GASTROESOPHAGEAL REFLUX DISEASE WITHOUT ESOPHAGITIS: ICD-10-CM

## 2019-01-14 DIAGNOSIS — I10 ESSENTIAL HYPERTENSION: ICD-10-CM

## 2019-01-14 LAB — VIT D+METAB SERPL-MCNC: 29 NG/ML (ref 30–100)

## 2019-01-14 PROCEDURE — 82306 VITAMIN D 25 HYDROXY: CPT

## 2019-01-14 RX ORDER — DESONIDE 0.5 MG/ML
LOTION TOPICAL
Qty: 118 ML | Refills: 1 | Status: SHIPPED | OUTPATIENT
Start: 2019-01-14 | End: 2019-05-20

## 2019-01-14 RX ORDER — ZOSTER VACCINE RECOMBINANT, ADJUVANTED 50 MCG/0.5
KIT INTRAMUSCULAR
COMMUNITY
Start: 2018-09-11 | End: 2021-02-01

## 2019-01-14 NOTE — PATIENT INSTRUCTIONS
Valdo Mei's SCREENING SCHEDULE   Tests on this list are recommended by your physician but may not be covered, or covered at this frequency, by your insurer. Please check with your insurance carrier before scheduling to verify coverage.    PREVENT years old and have smoked more than 100 cigarettes in their lifetime   • Anyone with a family history    Colorectal Cancer Screening  Covered up to Age 76     Colonoscopy Screen   Covered every 10 years- more often if abnormal There are no preventive care orders found for this or any previous visit. Please get every year    Pneumococcal 13 (Prevnar)  Covered Once after 65 No orders found for this or any previous visit.  Please get once after your 65th birthday    Pneumococcal 23 (Pneumovax)  Covered Once aft

## 2019-01-14 NOTE — PROGRESS NOTES
HPI:   Varun Pineda is a 66year old female who presents for a Medicare Subsequent Annual Wellness visit (Pt already had Initial Annual Wellness). No complaints.    Annual Physical due on 06/18/2019        Fall/Risk Assessment   She has been scre Encounters:  01/14/19 : 108 lb  06/18/18 : 107 lb  05/07/18 : 105 lb     Last Cholesterol Labs:   Lab Results   Component Value Date    CHOLEST 143 06/21/2016    HDL 78 06/21/2016    LDL 54 06/21/2016    TRIG 54 06/21/2016          Last Chemistry Labs:   L reports that she drinks alcohol. She reports that she does not use drugs.      REVIEW OF SYSTEMS:   GENERAL: feels well otherwise  SKIN: denies any unusual skin lesions  EYES: denies blurred vision or double vision  HEENT: denies nasal congestion, sinus ulysses bowel sounds active all four quadrants,  no masses, no organomegaly   Pelvic: Deferred   Extremities: Extremities normal, atraumatic, no cyanosis or edema   Pulses: 2+ and symmetric   Skin: Skin color, texture, turgor normal, no rashes or lesions   Lymph n provided for quick review of chart, separate sheet to patient  [address] Internal Lab or Procedure External Lab or Procedure   Diabetes Screening      HbgA1C   Annually No results found for: A1C No flowsheet data found year    Pneumococcal 13 (Prevnar)  Covered Once after 65 06/29/2015 Please get once after your 65th birthday    Pneumococcal 23 (Pneumovax)  Covered Once after 65 06/13/2016 Please get once after your 65th birthday    Hepatitis B for Moderate/High Risk No

## 2019-02-01 ENCOUNTER — TELEPHONE (OUTPATIENT)
Dept: INTERNAL MEDICINE CLINIC | Facility: CLINIC | Age: 74
End: 2019-02-01

## 2019-02-01 NOTE — TELEPHONE ENCOUNTER
Cologuard results received from [email or web address] on: 2/1/19  Specimen Collected: 1/22/19  Specimen Received: 1/23/19  Test results: NEGATIVE  Recommend screening in 3 years.

## 2019-02-01 NOTE — TELEPHONE ENCOUNTER
Spoke with pt to inform cologuard results are negative/ normal. Recommend screening in 3 years. Pt verbalized understanding and agreed with POC.

## 2019-02-11 DIAGNOSIS — R42 VERTIGO: ICD-10-CM

## 2019-02-11 RX ORDER — MECLIZINE HYDROCHLORIDE 25 MG/1
TABLET ORAL
Qty: 20 TABLET | Refills: 1 | Status: SHIPPED | OUTPATIENT
Start: 2019-02-11 | End: 2019-03-10

## 2019-02-11 NOTE — TELEPHONE ENCOUNTER
Last OV : 1/14/19 [id number] Gabriel  Upcoming appt/reason: none on file  Last refill date: 6/18/18     #/refills: 20/2  Last labs: 1/14/19 Vit D  When pt was asked to return for OV: 6 months (7/14/19)    Per protocol route.

## 2019-02-28 VITALS
HEIGHT: 64 IN | BODY MASS INDEX: 17.93 KG/M2 | SYSTOLIC BLOOD PRESSURE: 118 MMHG | DIASTOLIC BLOOD PRESSURE: 60 MMHG | HEART RATE: 60 BPM | RESPIRATION RATE: 16 BRPM | WEIGHT: 105 LBS

## 2019-03-05 DIAGNOSIS — K21.9 GASTROESOPHAGEAL REFLUX DISEASE WITHOUT ESOPHAGITIS: ICD-10-CM

## 2019-03-05 RX ORDER — OMEPRAZOLE 20 MG/1
CAPSULE, DELAYED RELEASE ORAL
Qty: 90 CAPSULE | Refills: 1 | Status: SHIPPED | OUTPATIENT
Start: 2019-03-05 | End: 2019-07-29

## 2019-03-05 NOTE — TELEPHONE ENCOUNTER
Last OV : 1/14/2019 [facility]  Upcoming appt/reason:  No future appointments. Last refill date: 12/18/18     #/refills: 90/0  Last labs: 1/14/19  When pt was asked to return for OV: 6 months    Per protocol route.   `

## 2019-03-10 DIAGNOSIS — R42 VERTIGO: ICD-10-CM

## 2019-03-11 NOTE — TELEPHONE ENCOUNTER
LFV:1/14/19 with AMS  Future Appt: none on file, pt to return in 6 months  Last Rx:2/11/19 for 20 tablets  Last Labs: 7/23/18 tsh, vitamin D, cbc, cmp  Per protocol to provider

## 2019-03-12 RX ORDER — MECLIZINE HYDROCHLORIDE 25 MG/1
TABLET ORAL
Qty: 30 TABLET | Refills: 2 | Status: SHIPPED | OUTPATIENT
Start: 2019-03-12 | End: 2019-07-29

## 2019-05-20 DIAGNOSIS — Z00.00 ENCOUNTER FOR ANNUAL HEALTH EXAMINATION: ICD-10-CM

## 2019-05-20 RX ORDER — DESONIDE 0.5 MG/ML
LOTION TOPICAL
Qty: 118 ML | Refills: 0 | Status: SHIPPED | OUTPATIENT
Start: 2019-05-20 | End: 2019-07-02

## 2019-05-20 NOTE — TELEPHONE ENCOUNTER
AMS- pt requested Hydrocortisone rx from Derm as well. Should both rx be coming from Derm? Please advise, thank you.     LOV: 1/14/19  Future OV: none  Last Rx: Hydrocortisone 1/28/19, #28.35 g with 0 refills  Desonide 1/14/19, #118 mL with 1 refill  Last

## 2019-06-10 DIAGNOSIS — I10 ESSENTIAL HYPERTENSION: ICD-10-CM

## 2019-06-11 RX ORDER — LISINOPRIL 10 MG/1
TABLET ORAL
Qty: 90 TABLET | Refills: 0 | Status: SHIPPED | OUTPATIENT
Start: 2019-06-11 | End: 2019-07-29

## 2019-06-11 RX ORDER — RALOXIFENE HYDROCHLORIDE 60 MG/1
TABLET, FILM COATED ORAL
Qty: 90 TABLET | Refills: 2 | Status: SHIPPED | OUTPATIENT
Start: 2019-06-11 | End: 2019-07-29

## 2019-06-11 NOTE — TELEPHONE ENCOUNTER
Blake Gasca on file   LAST RX:  Raloxifene 4/23/18 60 mg take 1 tab daily 90 tabs 3 refills     lisinopril 6/18/18 10 mg take 1 tab daily 90 tabs 1 refills     LAST LABS:1/22/19 cologuard, 1/14/19 vit D  PER PROTOCOL: to provider

## 2019-07-02 ENCOUNTER — TELEPHONE (OUTPATIENT)
Dept: INTERNAL MEDICINE CLINIC | Facility: CLINIC | Age: 74
End: 2019-07-02

## 2019-07-02 ENCOUNTER — OFFICE VISIT (OUTPATIENT)
Dept: FAMILY MEDICINE CLINIC | Facility: CLINIC | Age: 74
End: 2019-07-02
Payer: MEDICARE

## 2019-07-02 VITALS
DIASTOLIC BLOOD PRESSURE: 74 MMHG | HEIGHT: 62.5 IN | TEMPERATURE: 98 F | RESPIRATION RATE: 16 BRPM | BODY MASS INDEX: 19.74 KG/M2 | WEIGHT: 110 LBS | OXYGEN SATURATION: 98 % | SYSTOLIC BLOOD PRESSURE: 118 MMHG | HEART RATE: 82 BPM

## 2019-07-02 DIAGNOSIS — L56.8 PHOTODERMATITIS DUE TO SUN: Primary | ICD-10-CM

## 2019-07-02 DIAGNOSIS — Z00.00 ENCOUNTER FOR ANNUAL HEALTH EXAMINATION: ICD-10-CM

## 2019-07-02 PROCEDURE — 99213 OFFICE O/P EST LOW 20 MIN: CPT | Performed by: NURSE PRACTITIONER

## 2019-07-02 NOTE — TELEPHONE ENCOUNTER
Called pt back. Left detailed message ([id number] Cassy Mayer per HIPAA) advising pt to be evaluated at [facility] or  since AMS is not in the office until Friday and does not have any openings right now. Advised pt to call back with any questions.

## 2019-07-03 RX ORDER — DESONIDE 0.5 MG/ML
LOTION TOPICAL
Qty: 118 ML | Refills: 0 | Status: SHIPPED | OUTPATIENT
Start: 2019-07-03 | End: 2019-07-29

## 2019-07-03 NOTE — TELEPHONE ENCOUNTER
Last Office Visit: 1-14-19 for wellness  Last Rx Filled: 5-20-19 118ml with no refills   Last Labs: 1-14-19 vitamin D. 7-23-18 cbc/cmp/tsh/vitamin D  Future Appointment: 7-29-19    Per protocol to provider

## 2019-07-03 NOTE — PROGRESS NOTES
CC: Rash. HPI:  This is a rash problem. The current episode started in the past 1 day. The problem has been persisting since onset. The affected locations include bilateral anterior aspects of thighs  .  The rash is characterized by somewhat pru Problem Relation Age of Onset   • Other (Diabetes, type 2) Father    • Heart Disease Mother    • Other (Lung cancer) Mother    • Other (Acid reflux) Other         Children      Social History    Tobacco Use      Smoking status: Never Smoker      [place] Prescriptions Disp Refills   • triamcinolone acetonide 0.1 % External Cream 60 g 0     Sig: Apply topically 2 (two) times daily as needed. Imaging & Consults:  None    Skin care d/w the pt.   Avoidance of chlorinated pool water and sun exposure until

## 2019-07-16 NOTE — ED AVS SNAPSHOT
[facility] Emergency Department    [facility]  [address] [address]    Phone:  [phone]    Fax:  [phone]   MRN: [id number]    Department:  [facility] Emergency Department   Date of Visit:  1 [address] [phone]  Pediatric [phone] Emergency Department   [phone]       To Check ER Wait Times:  TEXT 'ERwait' to 08675      Click [email or web address]      Or call [phone]    If you have any will be contacted. Please make sure we have your correct phone number before you leave. After you leave, you should follow the attached instructions. I have read and understand the instructions given to me by my caregivers.         24-Hour Pharmacies CT BRAIN OR HEAD ([id number]) (Final result) Result time:  01/26/17 16:46:40    Final result    Impression:    CONCLUSION:  No acute intracranial abnormality.            Dictated by: Roula Murcia MD on 1/26/2017 at 16:42       Approved by: Roula Murcia MD independent

## 2019-07-18 ENCOUNTER — HOSPITAL ENCOUNTER (EMERGENCY)
Facility: HOSPITAL | Age: 74
Discharge: HOME | End: 2019-07-18
Attending: EMERGENCY MEDICINE
Payer: MEDICARE

## 2019-07-18 ENCOUNTER — APPOINTMENT (OUTPATIENT)
Dept: GENERAL RADIOLOGY | Facility: HOSPITAL | Age: 74
End: 2019-07-18
Attending: PHYSICIAN ASSISTANT
Payer: MEDICARE

## 2019-07-18 VITALS
DIASTOLIC BLOOD PRESSURE: 87 MMHG | RESPIRATION RATE: 16 BRPM | SYSTOLIC BLOOD PRESSURE: 138 MMHG | HEART RATE: 89 BPM | TEMPERATURE: 97 F | OXYGEN SATURATION: 100 %

## 2019-07-18 DIAGNOSIS — S93.401A SPRAIN OF RIGHT ANKLE, UNSPECIFIED LIGAMENT, INITIAL ENCOUNTER: ICD-10-CM

## 2019-07-18 DIAGNOSIS — S61.212A LACERATION OF RIGHT MIDDLE FINGER WITHOUT FOREIGN BODY WITHOUT DAMAGE TO NAIL, INITIAL ENCOUNTER: Primary | ICD-10-CM

## 2019-07-18 PROCEDURE — 99283 EMERGENCY DEPT VISIT LOW MDM: CPT

## 2019-07-18 PROCEDURE — 90471 IMMUNIZATION ADMIN: CPT

## 2019-07-18 PROCEDURE — 73610 X-RAY EXAM OF ANKLE: CPT | Performed by: PHYSICIAN ASSISTANT

## 2019-07-18 NOTE — ED PROVIDER NOTES
I reviewed that chart and discussed the case. I have examined the patient and noted. Nontoxic patient. Patient states she is tripped while going on the cement stairs to work. She has a minor avulsion of the tip of her right middle finger.   No active bl

## 2019-07-18 NOTE — ED PROVIDER NOTES
Patient Seen in: [facility] Emergency Department    History   Patient presents with:  Laceration Abrasion (integumentary)    Stated Complaint: laceration and fall    HPI    CHIEF COMPLAINT: Right middle finger laceration, right ankle injury    HISTO • TONSILLECTOMY             Social History    Tobacco Use      Smoking status: Never Smoker      Smokeless tobacco: Never Used    Alcohol use:  Yes      Alcohol/week: 0.0 oz      Comment: beer at bedtime    Drug use: No      Review of Systems    Positive fo Labs Reviewed - No data to display  Patient's wound was irrigated and Gelfoam and dressing placed. bleeding has been controlled.     Xr Ankle (min 3 Views), Right (cpt=73610)    Result Date: 7/18/2019  PROCEDURE:  XR ANKLE (MIN 3 VIEWS), RIGHT (CPT=73610) Laceration of right middle finger without foreign body without damage to nail, initial encounter  (primary encounter diagnosis)  Sprain of right ankle, unspecified ligament, initial encounter    Disposition:  Discharge  7/18/2019  5:06 pm    Follow-up:  Sc

## 2019-07-29 ENCOUNTER — OFFICE VISIT (OUTPATIENT)
Dept: INTERNAL MEDICINE CLINIC | Facility: CLINIC | Age: 74
End: 2019-07-29
Payer: MEDICARE

## 2019-07-29 VITALS
TEMPERATURE: 98 F | HEART RATE: 74 BPM | OXYGEN SATURATION: 99 % | RESPIRATION RATE: 17 BRPM | HEIGHT: 62.5 IN | DIASTOLIC BLOOD PRESSURE: 84 MMHG | WEIGHT: 107.63 LBS | SYSTOLIC BLOOD PRESSURE: 132 MMHG | BODY MASS INDEX: 19.31 KG/M2

## 2019-07-29 DIAGNOSIS — R42 VERTIGO: ICD-10-CM

## 2019-07-29 DIAGNOSIS — Z12.31 VISIT FOR SCREENING MAMMOGRAM: ICD-10-CM

## 2019-07-29 DIAGNOSIS — E55.9 VITAMIN D DEFICIENCY: ICD-10-CM

## 2019-07-29 DIAGNOSIS — I10 ESSENTIAL HYPERTENSION: Primary | ICD-10-CM

## 2019-07-29 DIAGNOSIS — M81.0 OSTEOPOROSIS, UNSPECIFIED OSTEOPOROSIS TYPE, UNSPECIFIED PATHOLOGICAL FRACTURE PRESENCE: ICD-10-CM

## 2019-07-29 DIAGNOSIS — K21.9 GASTROESOPHAGEAL REFLUX DISEASE WITHOUT ESOPHAGITIS: ICD-10-CM

## 2019-07-29 PROCEDURE — 99214 OFFICE O/P EST MOD 30 MIN: CPT | Performed by: FAMILY MEDICINE

## 2019-07-29 RX ORDER — RALOXIFENE HYDROCHLORIDE 60 MG/1
TABLET, FILM COATED ORAL
Qty: 90 TABLET | Refills: 2 | Status: SHIPPED | OUTPATIENT
Start: 2019-07-29 | End: 2020-01-27

## 2019-07-29 RX ORDER — MECLIZINE HYDROCHLORIDE 25 MG/1
25 TABLET ORAL 3 TIMES DAILY PRN
Qty: 30 TABLET | Refills: 2 | Status: SHIPPED | OUTPATIENT
Start: 2019-07-29 | End: 2020-01-27

## 2019-07-29 RX ORDER — LISINOPRIL 10 MG/1
TABLET ORAL
Qty: 90 TABLET | Refills: 1 | Status: SHIPPED | OUTPATIENT
Start: 2019-07-29 | End: 2020-01-27

## 2019-07-29 RX ORDER — OMEPRAZOLE 20 MG/1
20 CAPSULE, DELAYED RELEASE ORAL EVERY MORNING
Qty: 90 CAPSULE | Refills: 1 | Status: SHIPPED | OUTPATIENT
Start: 2019-07-29 | End: 2020-01-27

## 2019-07-29 RX ORDER — DESONIDE 0.5 MG/ML
1 LOTION TOPICAL 2 TIMES DAILY
Qty: 118 ML | Refills: 1 | Status: SHIPPED | OUTPATIENT
Start: 2019-07-29 | End: 2020-01-02

## 2019-07-29 NOTE — PROGRESS NOTES
HPI:    Patient ID: Syd Leon is a 66year old female. HPI Here for f/u on BP. Taking med as prescribed with no issues. Doesn't check BP regularly but no chest pain/shortness of breath/lightheadedness/headaches.    Had a fall and had some ankle Pulmonary/Chest: Effort normal.   Neurological: She is alert. Psychiatric: She has a normal mood and affect. Her behavior is normal.   Vitals reviewed.              ASSESSMENT/PLAN:   Essential hypertension  (primary encounter diagnosis)  Vertigo  Flores Eder

## 2019-08-26 ENCOUNTER — MED REC SCAN ONLY (OUTPATIENT)
Dept: INTERNAL MEDICINE CLINIC | Facility: CLINIC | Age: 74
End: 2019-08-26

## 2019-08-27 ENCOUNTER — APPOINTMENT (OUTPATIENT)
Dept: LAB | Age: 74
End: 2019-08-27
Attending: FAMILY MEDICINE
Payer: MEDICARE

## 2019-08-27 ENCOUNTER — HOSPITAL ENCOUNTER (OUTPATIENT)
Dept: BONE DENSITY | Age: 74
Discharge: HOME | End: 2019-08-27
Attending: FAMILY MEDICINE
Payer: MEDICARE

## 2019-08-27 ENCOUNTER — HOSPITAL ENCOUNTER (OUTPATIENT)
Dept: MAMMOGRAPHY | Age: 74
Discharge: HOME | End: 2019-08-27
Attending: FAMILY MEDICINE
Payer: MEDICARE

## 2019-08-27 DIAGNOSIS — Z12.31 VISIT FOR SCREENING MAMMOGRAM: ICD-10-CM

## 2019-08-27 DIAGNOSIS — E55.9 VITAMIN D DEFICIENCY: ICD-10-CM

## 2019-08-27 DIAGNOSIS — M81.0 OSTEOPOROSIS, UNSPECIFIED OSTEOPOROSIS TYPE, UNSPECIFIED PATHOLOGICAL FRACTURE PRESENCE: ICD-10-CM

## 2019-08-27 DIAGNOSIS — I10 ESSENTIAL HYPERTENSION: ICD-10-CM

## 2019-08-27 LAB
ALBUMIN SERPL-MCNC: 3.7 G/DL (ref 3.4–5)
ALBUMIN/GLOB SERPL: 1.1 {RATIO} (ref 1–2)
ALP LIVER SERPL-CCNC: 73 U/L (ref 55–142)
ALT SERPL-CCNC: 31 U/L (ref 13–56)
ANION GAP SERPL CALC-SCNC: 5 MMOL/L (ref 0–18)
AST SERPL-CCNC: 29 U/L (ref 15–37)
BILIRUB SERPL-MCNC: 0.4 MG/DL (ref 0.1–2)
BUN BLD-MCNC: 17 MG/DL (ref 7–18)
BUN/CREAT SERPL: 19.8 (ref 10–20)
CALCIUM BLD-MCNC: 9.4 MG/DL (ref 8.5–10.1)
CHLORIDE SERPL-SCNC: 106 MMOL/L (ref 98–112)
CO2 SERPL-SCNC: 27 MMOL/L (ref 21–32)
CREAT BLD-MCNC: 0.86 MG/DL (ref 0.55–1.02)
GLOBULIN PLAS-MCNC: 3.5 G/DL (ref 2.8–4.4)
GLUCOSE BLD-MCNC: 79 MG/DL (ref 70–99)
M PROTEIN MFR SERPL ELPH: 7.2 G/DL (ref 6.4–8.2)
OSMOLALITY SERPL CALC.SUM OF ELEC: 286 MOSM/KG (ref 275–295)
POTASSIUM SERPL-SCNC: 4.4 MMOL/L (ref 3.5–5.1)
SODIUM SERPL-SCNC: 138 MMOL/L (ref 136–145)
VIT D+METAB SERPL-MCNC: 24.2 NG/ML (ref 30–100)

## 2019-08-27 PROCEDURE — 77080 DXA BONE DENSITY AXIAL: CPT | Performed by: FAMILY MEDICINE

## 2019-08-27 PROCEDURE — 82306 VITAMIN D 25 HYDROXY: CPT

## 2019-08-27 PROCEDURE — 80053 COMPREHEN METABOLIC PANEL: CPT

## 2019-08-27 PROCEDURE — 36415 COLL VENOUS BLD VENIPUNCTURE: CPT

## 2019-08-27 PROCEDURE — 77067 SCR MAMMO BI INCL CAD: CPT | Performed by: FAMILY MEDICINE

## 2019-08-27 PROCEDURE — 77063 BREAST TOMOSYNTHESIS BI: CPT | Performed by: FAMILY MEDICINE

## 2019-09-03 ENCOUNTER — TELEPHONE (OUTPATIENT)
Dept: INTERNAL MEDICINE CLINIC | Facility: CLINIC | Age: 74
End: 2019-09-03

## 2019-09-09 NOTE — TELEPHONE ENCOUNTER
Naga Quiroz came in and was hoping to talk to someone today regarding her results. She's off today and would really like someone to call her today for the results. Please advise.

## 2019-09-10 DIAGNOSIS — M81.6 LOCALIZED OSTEOPOROSIS WITHOUT CURRENT PATHOLOGICAL FRACTURE: Primary | ICD-10-CM

## 2019-09-13 DIAGNOSIS — M81.6 LOCALIZED OSTEOPOROSIS WITHOUT CURRENT PATHOLOGICAL FRACTURE: Primary | ICD-10-CM

## 2019-09-30 ENCOUNTER — TELEPHONE (OUTPATIENT)
Dept: INTERNAL MEDICINE CLINIC | Facility: CLINIC | Age: 74
End: 2019-09-30

## 2019-09-30 NOTE — TELEPHONE ENCOUNTER
Pt left a letter with medication     Would like AMS to advice on how to take medication and when.     Note placed in AMS box

## 2019-10-01 NOTE — TELEPHONE ENCOUNTER
Please call patient to let her know I reviewed her letter and that it looks like she is taking her meds at appropriate times already (she was asking for advice on when to take her meds).    She also asked about taking Calcium- she was wondering if should sh

## 2019-10-01 NOTE — TELEPHONE ENCOUNTER
Called pt, left detailed message (Ok per HIPAA) explaining AMS instructions and to call the office back with any further questions. Sent letter to scan.

## 2019-11-22 ENCOUNTER — APPOINTMENT (OUTPATIENT)
Dept: GENERAL RADIOLOGY | Facility: HOSPITAL | Age: 74
End: 2019-11-22
Payer: MEDICARE

## 2019-11-22 ENCOUNTER — HOSPITAL ENCOUNTER (EMERGENCY)
Facility: HOSPITAL | Age: 74
Discharge: HOME | End: 2019-11-22
Payer: MEDICARE

## 2019-11-22 VITALS
SYSTOLIC BLOOD PRESSURE: 120 MMHG | HEIGHT: 62 IN | BODY MASS INDEX: 19.32 KG/M2 | WEIGHT: 105 LBS | TEMPERATURE: 98 F | DIASTOLIC BLOOD PRESSURE: 74 MMHG | HEART RATE: 80 BPM | OXYGEN SATURATION: 99 % | RESPIRATION RATE: 16 BRPM

## 2019-11-22 DIAGNOSIS — S50.01XA CONTUSION OF RIGHT ELBOW, INITIAL ENCOUNTER: Primary | ICD-10-CM

## 2019-11-22 PROCEDURE — 99283 EMERGENCY DEPT VISIT LOW MDM: CPT

## 2019-11-22 PROCEDURE — 73080 X-RAY EXAM OF ELBOW: CPT

## 2019-11-22 NOTE — ED NOTES
I reviewed that chart and discussed the case. I have examined the patient and noted patient is a 57-year-old female who is right-hand dominant presents emergency room with history of tripping and falling this morning at work landing on her right elbow.   P follow-up with primary care and return to the ER immediately if symptoms worsen or if any other problems arise. Patient discharged home at this time.   .      I agree with the following clinical impression(s):  Acute right elbow contusion    I agree with t

## 2019-11-22 NOTE — ED PROVIDER NOTES
Patient Seen in: [facility] Emergency Department      History   Patient presents with:  Upper Extremity Injury (musculoskeletal)    Stated Complaint: right elbow pain after fall this morning - denies LOC     HPI    Shama Cast is a 66-year-old female w no acute distress  HEENT: Normocephalic, atraumatic, PERRLA, EOMI, normal TMs, normal oropharynx  Neck: No tenderness, full ROM, no cervical LAD  Back: No tenderness, full ROM, negataive straight leg raise  Chest: Nontender, normal expansion  Cardio: RRR, Stefania's and would like a note to excuse her from heavy lifting for a couple of days while this heals. This is appropriate and is provided for her. She is instructed on Tylenol use for pain.   She may follow-up with her primary care physician if symptoms pe

## 2020-01-02 RX ORDER — DESONIDE 0.5 MG/ML
LOTION TOPICAL
Qty: 118 ML | Refills: 0 | Status: SHIPPED | OUTPATIENT
Start: 2020-01-02 | End: 2020-01-27

## 2020-01-02 NOTE — TELEPHONE ENCOUNTER
GIF:[birth year]-LEAODGHYQ hypertension  Last CPE:1/14/19  Last refill:Desonide 0.05 % External Lotion-7/29/19  Quantity: 118 mL, 1 refills  Last labs that are related: n/a  Future appointment: None  Protocol: pend    Please approve or deny
General

## 2020-01-27 ENCOUNTER — OFFICE VISIT (OUTPATIENT)
Dept: INTERNAL MEDICINE CLINIC | Facility: CLINIC | Age: 75
End: 2020-01-27
Payer: MEDICARE

## 2020-01-27 VITALS
HEIGHT: 62 IN | DIASTOLIC BLOOD PRESSURE: 66 MMHG | TEMPERATURE: 98 F | BODY MASS INDEX: 20.5 KG/M2 | RESPIRATION RATE: 16 BRPM | SYSTOLIC BLOOD PRESSURE: 120 MMHG | HEART RATE: 52 BPM | WEIGHT: 111.38 LBS

## 2020-01-27 DIAGNOSIS — Z00.00 ENCOUNTER FOR ANNUAL HEALTH EXAMINATION: Primary | ICD-10-CM

## 2020-01-27 DIAGNOSIS — I10 ESSENTIAL HYPERTENSION: ICD-10-CM

## 2020-01-27 DIAGNOSIS — M81.6 LOCALIZED OSTEOPOROSIS WITHOUT CURRENT PATHOLOGICAL FRACTURE: ICD-10-CM

## 2020-01-27 DIAGNOSIS — K21.9 GASTROESOPHAGEAL REFLUX DISEASE WITHOUT ESOPHAGITIS: ICD-10-CM

## 2020-01-27 DIAGNOSIS — E55.9 VITAMIN D DEFICIENCY: ICD-10-CM

## 2020-01-27 DIAGNOSIS — R42 VERTIGO: ICD-10-CM

## 2020-01-27 PROCEDURE — G0439 PPPS, SUBSEQ VISIT: HCPCS | Performed by: FAMILY MEDICINE

## 2020-01-27 RX ORDER — MECLIZINE HYDROCHLORIDE 25 MG/1
25 TABLET ORAL 3 TIMES DAILY PRN
Qty: 30 TABLET | Refills: 1 | Status: SHIPPED | OUTPATIENT
Start: 2020-01-27 | End: 2020-03-16

## 2020-01-27 RX ORDER — OMEPRAZOLE 20 MG/1
20 CAPSULE, DELAYED RELEASE ORAL EVERY MORNING
Qty: 90 CAPSULE | Refills: 1 | Status: SHIPPED | OUTPATIENT
Start: 2020-01-27 | End: 2020-03-16

## 2020-01-27 RX ORDER — LISINOPRIL 10 MG/1
TABLET ORAL
Qty: 90 TABLET | Refills: 1 | Status: SHIPPED | OUTPATIENT
Start: 2020-01-27 | End: 2020-07-27

## 2020-01-27 RX ORDER — DESONIDE 0.5 MG/ML
LOTION TOPICAL
Qty: 118 ML | Refills: 0 | Status: SHIPPED | OUTPATIENT
Start: 2020-01-27 | End: 2020-03-11

## 2020-01-27 RX ORDER — RALOXIFENE HYDROCHLORIDE 60 MG/1
TABLET, FILM COATED ORAL
Qty: 90 TABLET | Refills: 1 | Status: SHIPPED | OUTPATIENT
Start: 2020-01-27 | End: 2020-07-27

## 2020-01-27 NOTE — PROGRESS NOTES
HPI:   Trish Saul is a 66year old female who presents for a Medicare Subsequent Annual Wellness visit (Pt already had Initial Annual Wellness). Doing well. Here for f/u on BP. Taking med as prescribed with no issues.  Doesn't check BP regularl Osteoporosis     Vitamin D deficiency     Essential hypertension     Gastroesophageal reflux disease without esophagitis    Wt Readings from Last 3 Encounters:  01/27/20 : 111 lb 6 oz (50.5 kg)  12/16/19 : 112 lb (50.8 kg)  11/22/19 : 105 lb (47.6 kg) HISTORY:   She  reports that she has never smoked. She has never used smokeless tobacco. She reports current alcohol use. She reports that she does not use drugs.      REVIEW OF SYSTEMS:   GENERAL: feels well otherwise  SKIN: denies any unusual skin lesions normal, no murmur, rub, or gallop   Abdomen:   Soft, non-tender, bowel sounds active all four quadrants,  no masses, no organomegaly   Pelvic: Deferred   Extremities: Extremities normal, atraumatic, no cyanosis or edema   Pulses: 2+ and symmetric   Skin: S Value   06/10/2014 73          Cardiovascular Disease Screening     LDL Annually LDL Cholesterol (mg/dL)   Date Value   06/21/2016 54     LDL CHOLESTROL (mg/dL)   Date Value   06/10/2014 50        EKG - w/ Initial Preventative Physical Exam only, or if med for the mentally retarded   Persons who live in the same house as a HepB virus carrier   Homosexual men   Illicit injectable drug abusers     Tetanus Toxoid  Only covered with a cut with metal- TD and TDaP Not covered by Medicare Part B No vaccine history

## 2020-03-10 NOTE — TELEPHONE ENCOUNTER
Last Ov: 1/27/20, AMS, physical  Upcoming appt: no upcoming appt  Last labs: CMP, Vit D 8/27/19  Last Rx: desonide 0.05%, 118mL, 0R 1/27/20

## 2020-03-11 RX ORDER — DESONIDE 0.5 MG/ML
LOTION TOPICAL
Qty: 118 ML | Refills: 0 | Status: SHIPPED | OUTPATIENT
Start: 2020-03-11 | End: 2020-05-12

## 2020-03-16 DIAGNOSIS — L56.8 PHOTODERMATITIS DUE TO SUN: ICD-10-CM

## 2020-03-16 DIAGNOSIS — K21.9 GASTROESOPHAGEAL REFLUX DISEASE WITHOUT ESOPHAGITIS: ICD-10-CM

## 2020-03-16 DIAGNOSIS — R42 VERTIGO: ICD-10-CM

## 2020-03-16 RX ORDER — MECLIZINE HYDROCHLORIDE 25 MG/1
25 TABLET ORAL 3 TIMES DAILY PRN
Qty: 30 TABLET | Refills: 1 | Status: SHIPPED | OUTPATIENT
Start: 2020-03-16 | End: 2020-07-27

## 2020-03-16 RX ORDER — OMEPRAZOLE 20 MG/1
20 CAPSULE, DELAYED RELEASE ORAL EVERY MORNING
Qty: 90 CAPSULE | Refills: 1 | Status: SHIPPED | OUTPATIENT
Start: 2020-03-16 | End: 2020-07-27

## 2020-03-16 NOTE — TELEPHONE ENCOUNTER
LOV: 1/27/20-medicare annual    Spoke with patient-she is requesting Meclizine, Omeprazole and ointment/cream hydrocortisone for back of ears and bottom.     Meclizine hcl 25mg-1/27/20 #30tab, 1 refills  Omeprazole 20mg-1/27/20 #90cap, 1 refills

## 2020-05-12 RX ORDER — DESONIDE 0.5 MG/ML
LOTION TOPICAL
Qty: 118 ML | Refills: 0 | Status: SHIPPED | OUTPATIENT
Start: 2020-05-12 | End: 2020-06-25

## 2020-05-12 NOTE — TELEPHONE ENCOUNTER
Last Ov:1/27/20  Upcoming appt:7/27/20  Last labs:8/27/20 vitamin D, cmp  Last Rx:3/11/20 desonide 0.05%    Per Protocol sent for review

## 2020-06-25 RX ORDER — DESONIDE 0.5 MG/ML
LOTION TOPICAL
Qty: 118 ML | Refills: 0 | Status: SHIPPED | OUTPATIENT
Start: 2020-06-25 | End: 2020-07-27

## 2020-06-25 NOTE — TELEPHONE ENCOUNTER
Last VISIT 1/27/20 AMS CPE    Last REFILL 5/12/20 Desonide 118mL 0R    Last LABS 8/27/19 VitD, CMP    Future Appointments   Date Time Provider Lluvia Irene   7/27/2020  9:30 AM Mayda Hint, DO [facility] [facility]         Per PROTOCOL?  Desonide no

## 2020-07-27 ENCOUNTER — OFFICE VISIT (OUTPATIENT)
Dept: INTERNAL MEDICINE CLINIC | Facility: CLINIC | Age: 75
End: 2020-07-27
Payer: MEDICARE

## 2020-07-27 VITALS
SYSTOLIC BLOOD PRESSURE: 130 MMHG | HEART RATE: 80 BPM | RESPIRATION RATE: 16 BRPM | HEIGHT: 62 IN | BODY MASS INDEX: 20.15 KG/M2 | WEIGHT: 109.5 LBS | DIASTOLIC BLOOD PRESSURE: 80 MMHG | TEMPERATURE: 98 F

## 2020-07-27 DIAGNOSIS — Z12.31 VISIT FOR SCREENING MAMMOGRAM: ICD-10-CM

## 2020-07-27 DIAGNOSIS — R42 VERTIGO: ICD-10-CM

## 2020-07-27 DIAGNOSIS — I10 ESSENTIAL HYPERTENSION: Primary | ICD-10-CM

## 2020-07-27 DIAGNOSIS — E55.9 VITAMIN D DEFICIENCY: ICD-10-CM

## 2020-07-27 DIAGNOSIS — K21.9 GASTROESOPHAGEAL REFLUX DISEASE WITHOUT ESOPHAGITIS: ICD-10-CM

## 2020-07-27 PROCEDURE — 99214 OFFICE O/P EST MOD 30 MIN: CPT | Performed by: FAMILY MEDICINE

## 2020-07-27 RX ORDER — RALOXIFENE HYDROCHLORIDE 60 MG/1
TABLET, FILM COATED ORAL
Qty: 90 TABLET | Refills: 1 | Status: SHIPPED | OUTPATIENT
Start: 2020-07-27 | End: 2021-02-04

## 2020-07-27 RX ORDER — OMEPRAZOLE 20 MG/1
20 CAPSULE, DELAYED RELEASE ORAL EVERY MORNING
Qty: 90 CAPSULE | Refills: 1 | Status: SHIPPED | OUTPATIENT
Start: 2020-07-27 | End: 2021-02-04

## 2020-07-27 RX ORDER — DESONIDE 0.5 MG/ML
LOTION TOPICAL
Qty: 118 ML | Refills: 0 | Status: SHIPPED | OUTPATIENT
Start: 2020-07-27 | End: 2020-09-10

## 2020-07-27 RX ORDER — LISINOPRIL 10 MG/1
TABLET ORAL
Qty: 90 TABLET | Refills: 1 | Status: SHIPPED | OUTPATIENT
Start: 2020-07-27 | End: 2021-01-14

## 2020-07-27 RX ORDER — MECLIZINE HYDROCHLORIDE 25 MG/1
25 TABLET ORAL 3 TIMES DAILY PRN
Qty: 30 TABLET | Refills: 1 | Status: SHIPPED | OUTPATIENT
Start: 2020-07-27 | End: 2021-02-04

## 2020-07-27 NOTE — PROGRESS NOTES
HPI:    Patient ID: Chris Aguiar is a 66year old female. HPI Here for f/u on BP. Taking med as prescribed with no issues. Doesn't check BP regularly but no chest pain/shortness of breath/lightheadedness/headaches.    Taking vitamin D as prescribed Cardiovascular: Normal rate, regular rhythm and normal heart sounds. Pulmonary/Chest: Effort normal and breath sounds normal.   Neurological: She is alert. Psychiatric: She has a normal mood and affect. Her behavior is normal.   Vitals reviewed.

## 2020-08-07 ENCOUNTER — APPOINTMENT (OUTPATIENT)
Dept: LAB | Age: 75
End: 2020-08-07
Attending: FAMILY MEDICINE
Payer: MEDICARE

## 2020-08-07 DIAGNOSIS — I10 ESSENTIAL HYPERTENSION: ICD-10-CM

## 2020-08-07 DIAGNOSIS — E55.9 VITAMIN D DEFICIENCY: ICD-10-CM

## 2020-08-07 LAB
ALBUMIN SERPL-MCNC: 3.7 G/DL (ref 3.4–5)
ALBUMIN/GLOB SERPL: 1.1 {RATIO} (ref 1–2)
ALP LIVER SERPL-CCNC: 65 U/L (ref 55–142)
ALT SERPL-CCNC: 31 U/L (ref 13–56)
ANION GAP SERPL CALC-SCNC: 3 MMOL/L (ref 0–18)
AST SERPL-CCNC: 29 U/L (ref 15–37)
BILIRUB SERPL-MCNC: 0.5 MG/DL (ref 0.1–2)
BUN BLD-MCNC: 10 MG/DL (ref 7–18)
BUN/CREAT SERPL: 14.9 (ref 10–20)
CALCIUM BLD-MCNC: 9.9 MG/DL (ref 8.5–10.1)
CHLORIDE SERPL-SCNC: 107 MMOL/L (ref 98–112)
CO2 SERPL-SCNC: 30 MMOL/L (ref 21–32)
CREAT BLD-MCNC: 0.67 MG/DL (ref 0.55–1.02)
GLOBULIN PLAS-MCNC: 3.3 G/DL (ref 2.8–4.4)
GLUCOSE BLD-MCNC: 91 MG/DL (ref 70–99)
M PROTEIN MFR SERPL ELPH: 7 G/DL (ref 6.4–8.2)
OSMOLALITY SERPL CALC.SUM OF ELEC: 289 MOSM/KG (ref 275–295)
PATIENT FASTING Y/N/NP: YES
POTASSIUM SERPL-SCNC: 4.5 MMOL/L (ref 3.5–5.1)
SODIUM SERPL-SCNC: 140 MMOL/L (ref 136–145)
VIT D+METAB SERPL-MCNC: 58.9 NG/ML (ref 30–100)

## 2020-08-07 PROCEDURE — 82306 VITAMIN D 25 HYDROXY: CPT

## 2020-08-07 PROCEDURE — 80053 COMPREHEN METABOLIC PANEL: CPT

## 2020-08-07 PROCEDURE — 36415 COLL VENOUS BLD VENIPUNCTURE: CPT

## 2020-08-28 ENCOUNTER — HOSPITAL ENCOUNTER (OUTPATIENT)
Dept: MAMMOGRAPHY | Age: 75
Discharge: HOME | End: 2020-08-28
Attending: FAMILY MEDICINE
Payer: MEDICARE

## 2020-08-28 DIAGNOSIS — Z12.31 VISIT FOR SCREENING MAMMOGRAM: ICD-10-CM

## 2020-08-28 PROCEDURE — 77067 SCR MAMMO BI INCL CAD: CPT | Performed by: FAMILY MEDICINE

## 2020-08-28 PROCEDURE — 77063 BREAST TOMOSYNTHESIS BI: CPT | Performed by: FAMILY MEDICINE

## 2020-09-10 RX ORDER — DESONIDE 0.5 MG/ML
LOTION TOPICAL
Qty: 118 ML | Refills: 3 | Status: SHIPPED | OUTPATIENT
Start: 2020-09-10 | End: 2021-02-04

## 2020-09-10 NOTE — TELEPHONE ENCOUNTER
LOV:7/27/20, Hypertension, AMS  Last CPE:01/27/20, CPE, AMS  Last refill:Desonide 0.05 % External Lotion-7/27/20  Quantity:118 ml, 0 refills  Last labs that are related: 08/07/20  Future appointment:No future appointments.   Protocol: pend for provider    P

## 2020-12-02 ENCOUNTER — TELEPHONE (OUTPATIENT)
Dept: INTERNAL MEDICINE CLINIC | Facility: CLINIC | Age: 75
End: 2020-12-02

## 2020-12-02 DIAGNOSIS — Z00.00 ROUTINE GENERAL MEDICAL EXAMINATION AT A HEALTH CARE FACILITY: Primary | ICD-10-CM

## 2020-12-02 DIAGNOSIS — Z13.228 SCREENING FOR METABOLIC DISORDER: ICD-10-CM

## 2020-12-02 DIAGNOSIS — M81.6 LOCALIZED OSTEOPOROSIS WITHOUT CURRENT PATHOLOGICAL FRACTURE: ICD-10-CM

## 2020-12-02 DIAGNOSIS — E55.9 VITAMIN D DEFICIENCY: ICD-10-CM

## 2020-12-02 DIAGNOSIS — Z13.220 SCREENING FOR LIPID DISORDERS: ICD-10-CM

## 2020-12-02 DIAGNOSIS — Z13.0 SCREENING FOR DISORDER OF BLOOD AND BLOOD-FORMING ORGANS: ICD-10-CM

## 2020-12-02 DIAGNOSIS — Z13.29 SCREENING FOR THYROID DISORDER: ICD-10-CM

## 2020-12-02 NOTE — TELEPHONE ENCOUNTER
Wellness   Future Appointments   Date Time Provider Lluvia Irene   2/1/2021 10:45 AM Caroline Hayes,  [facility] [facility]        Orders to THE [facility]  aware must fast no call back required

## 2021-01-14 DIAGNOSIS — I10 ESSENTIAL HYPERTENSION: ICD-10-CM

## 2021-01-14 RX ORDER — LISINOPRIL 10 MG/1
TABLET ORAL
Qty: 90 TABLET | Refills: 1 | Status: SHIPPED | OUTPATIENT
Start: 2021-01-14 | End: 2021-02-04

## 2021-01-14 NOTE — TELEPHONE ENCOUNTER
Last visit-  07/27/2020 essential hypertension     Last refill-  07/27/2020 lisinopril 10mg QTY90 1R    Last labs-  08/07/2020 vitamin d, cmp  Future Appointments   Date Time Provider Lluvia Irene   1/19/2021  7:15 AM JACKI [facility]  [facility]

## 2021-01-19 ENCOUNTER — LABORATORY ENCOUNTER (OUTPATIENT)
Dept: LAB | Age: 76
End: 2021-01-19
Attending: FAMILY MEDICINE
Payer: MEDICARE

## 2021-01-19 DIAGNOSIS — Z13.220 SCREENING FOR LIPID DISORDERS: ICD-10-CM

## 2021-01-19 DIAGNOSIS — M81.6 LOCALIZED OSTEOPOROSIS WITHOUT CURRENT PATHOLOGICAL FRACTURE: ICD-10-CM

## 2021-01-19 DIAGNOSIS — E55.9 VITAMIN D DEFICIENCY: ICD-10-CM

## 2021-01-19 DIAGNOSIS — Z13.228 SCREENING FOR METABOLIC DISORDER: ICD-10-CM

## 2021-01-19 DIAGNOSIS — Z13.29 SCREENING FOR THYROID DISORDER: ICD-10-CM

## 2021-01-19 DIAGNOSIS — Z13.0 SCREENING FOR DISORDER OF BLOOD AND BLOOD-FORMING ORGANS: ICD-10-CM

## 2021-01-19 DIAGNOSIS — Z00.00 ROUTINE GENERAL MEDICAL EXAMINATION AT A HEALTH CARE FACILITY: ICD-10-CM

## 2021-01-19 LAB
ALBUMIN SERPL-MCNC: 3.7 G/DL (ref 3.4–5)
ALBUMIN/GLOB SERPL: 1.1 {RATIO} (ref 1–2)
ALP LIVER SERPL-CCNC: 69 U/L
ALT SERPL-CCNC: 31 U/L
ANION GAP SERPL CALC-SCNC: 4 MMOL/L (ref 0–18)
AST SERPL-CCNC: 29 U/L (ref 15–37)
BASOPHILS # BLD AUTO: 0.1 X10(3) UL (ref 0–0.2)
BASOPHILS NFR BLD AUTO: 1.7 %
BILIRUB SERPL-MCNC: 0.7 MG/DL (ref 0.1–2)
BUN BLD-MCNC: 16 MG/DL (ref 7–18)
BUN/CREAT SERPL: 22.2 (ref 10–20)
CALCIUM BLD-MCNC: 9.7 MG/DL (ref 8.5–10.1)
CHLORIDE SERPL-SCNC: 108 MMOL/L (ref 98–112)
CHOLEST SMN-MCNC: 170 MG/DL (ref ?–200)
CO2 SERPL-SCNC: 27 MMOL/L (ref 21–32)
CREAT BLD-MCNC: 0.72 MG/DL
DEPRECATED RDW RBC AUTO: 48.9 FL (ref 35.1–46.3)
EOSINOPHIL # BLD AUTO: 0.18 X10(3) UL (ref 0–0.7)
EOSINOPHIL NFR BLD AUTO: 3.1 %
ERYTHROCYTE [DISTWIDTH] IN BLOOD BY AUTOMATED COUNT: 13.2 % (ref 11–15)
GLOBULIN PLAS-MCNC: 3.5 G/DL (ref 2.8–4.4)
GLUCOSE BLD-MCNC: 88 MG/DL (ref 70–99)
HCT VFR BLD AUTO: 40.1 %
HDLC SERPL-MCNC: 85 MG/DL (ref 40–59)
HGB BLD-MCNC: 12.9 G/DL
IMM GRANULOCYTES # BLD AUTO: 0.01 X10(3) UL (ref 0–1)
IMM GRANULOCYTES NFR BLD: 0.2 %
LDLC SERPL CALC-MCNC: 74 MG/DL (ref ?–100)
LYMPHOCYTES # BLD AUTO: 1.47 X10(3) UL (ref 1–4)
LYMPHOCYTES NFR BLD AUTO: 24.9 %
M PROTEIN MFR SERPL ELPH: 7.2 G/DL (ref 6.4–8.2)
MCH RBC QN AUTO: 32.2 PG (ref 26–34)
MCHC RBC AUTO-ENTMCNC: 32.2 G/DL (ref 31–37)
MCV RBC AUTO: 100 FL
MONOCYTES # BLD AUTO: 0.58 X10(3) UL (ref 0.1–1)
MONOCYTES NFR BLD AUTO: 9.8 %
NEUTROPHILS # BLD AUTO: 3.56 X10 (3) UL (ref 1.5–7.7)
NEUTROPHILS # BLD AUTO: 3.56 X10(3) UL (ref 1.5–7.7)
NEUTROPHILS NFR BLD AUTO: 60.3 %
NONHDLC SERPL-MCNC: 85 MG/DL (ref ?–130)
OSMOLALITY SERPL CALC.SUM OF ELEC: 289 MOSM/KG (ref 275–295)
PATIENT FASTING Y/N/NP: YES
PATIENT FASTING Y/N/NP: YES
PLATELET # BLD AUTO: 309 10(3)UL (ref 150–450)
POTASSIUM SERPL-SCNC: 4.1 MMOL/L (ref 3.5–5.1)
PTH-INTACT SERPL-MCNC: 115.6 PG/ML (ref 18.5–88)
RBC # BLD AUTO: 4.01 X10(6)UL
SODIUM SERPL-SCNC: 139 MMOL/L (ref 136–145)
T4 FREE SERPL-MCNC: 1 NG/DL (ref 0.8–1.7)
TRIGL SERPL-MCNC: 56 MG/DL (ref 30–149)
TSI SER-ACNC: 3.84 MIU/ML (ref 0.36–3.74)
VIT D+METAB SERPL-MCNC: 69.9 NG/ML (ref 30–100)
VLDLC SERPL CALC-MCNC: 11 MG/DL (ref 0–30)
WBC # BLD AUTO: 5.9 X10(3) UL (ref 4–11)

## 2021-01-19 PROCEDURE — 80053 COMPREHEN METABOLIC PANEL: CPT

## 2021-01-19 PROCEDURE — 80061 LIPID PANEL: CPT

## 2021-01-19 PROCEDURE — 36415 COLL VENOUS BLD VENIPUNCTURE: CPT

## 2021-01-19 PROCEDURE — 84443 ASSAY THYROID STIM HORMONE: CPT

## 2021-01-19 PROCEDURE — 84439 ASSAY OF FREE THYROXINE: CPT

## 2021-01-19 PROCEDURE — 83970 ASSAY OF PARATHORMONE: CPT

## 2021-01-19 PROCEDURE — 82306 VITAMIN D 25 HYDROXY: CPT

## 2021-01-19 PROCEDURE — 85025 COMPLETE CBC W/AUTO DIFF WBC: CPT

## 2021-01-25 ENCOUNTER — TELEPHONE (OUTPATIENT)
Dept: INTERNAL MEDICINE CLINIC | Facility: CLINIC | Age: 76
End: 2021-01-25

## 2021-01-25 NOTE — TELEPHONE ENCOUNTER
Pt called about her lab results that were sent to her mychart-one was abnormal and she would like to discuss it please

## 2021-01-26 NOTE — TELEPHONE ENCOUNTER
Recent labs on immediate release from 1/19/21. Pt has an appt scheduled for 2/1/21 with [facility]. AMS, do you want to comment on them?

## 2021-01-26 NOTE — TELEPHONE ENCOUNTER
Yes, I was planning on talking to her about the results when I see her in-office. You can reassure her I was waiting to talk with her until her visit because nothing on the results is urgent.

## 2021-01-26 NOTE — TELEPHONE ENCOUNTER
Patient notified Ronnie Villaseñor will talk to her about her lab results at her appt Monday 2/1/21. Pt reassured nothing urgent. Pt is on [facility] and notified she would be getting an email for 1b for scheduling the COVID Vaccine. Pt verbalizes understanding.

## 2021-02-01 ENCOUNTER — OFFICE VISIT (OUTPATIENT)
Dept: INTERNAL MEDICINE CLINIC | Facility: CLINIC | Age: 76
End: 2021-02-01
Payer: MEDICARE

## 2021-02-01 VITALS
SYSTOLIC BLOOD PRESSURE: 130 MMHG | HEART RATE: 78 BPM | TEMPERATURE: 98 F | BODY MASS INDEX: 20.7 KG/M2 | WEIGHT: 112.5 LBS | HEIGHT: 62 IN | DIASTOLIC BLOOD PRESSURE: 80 MMHG | RESPIRATION RATE: 16 BRPM

## 2021-02-01 DIAGNOSIS — M81.6 LOCALIZED OSTEOPOROSIS WITHOUT CURRENT PATHOLOGICAL FRACTURE: ICD-10-CM

## 2021-02-01 DIAGNOSIS — E55.9 VITAMIN D DEFICIENCY: ICD-10-CM

## 2021-02-01 DIAGNOSIS — K21.9 GASTROESOPHAGEAL REFLUX DISEASE WITHOUT ESOPHAGITIS: ICD-10-CM

## 2021-02-01 DIAGNOSIS — Z00.00 ENCOUNTER FOR ANNUAL HEALTH EXAMINATION: Primary | ICD-10-CM

## 2021-02-01 DIAGNOSIS — E34.9 ELEVATED PARATHYROID HORMONE: ICD-10-CM

## 2021-02-01 DIAGNOSIS — I10 ESSENTIAL HYPERTENSION: ICD-10-CM

## 2021-02-01 PROCEDURE — G0439 PPPS, SUBSEQ VISIT: HCPCS | Performed by: FAMILY MEDICINE

## 2021-02-01 NOTE — PROGRESS NOTES
HPI:   Jostin Keller is a 64year old female who presents for a Medicare Subsequent Annual Wellness visit (Pt already had Initial Annual Wellness). Essential hypertension- taking BP med as prescribed with no issues.      Vitamin D deficiency- take Vitamin D deficiency     Essential hypertension     Gastroesophageal reflux disease without esophagitis    Wt Readings from Last 3 Encounters:  02/01/21 : 112 lb 8 oz (51 kg)  07/27/20 : 109 lb 8 oz (49.7 kg)  01/27/20 : 111 lb 6 oz (50.5 kg)     Last Ch reflux in an other family member; Diabetes, type 2 in her father; Heart Disease in her mother; Lung cancer in her mother. SOCIAL HISTORY:   She  reports that she has never smoked. She has never used smokeless tobacco. She reports current alcohol use.  She not enlarged, symmetric, no tenderness/mass/nodules; no carotid bruit or JVD   Back:   Symmetric, no curvature, ROM normal, no CVA tenderness   Lungs:   Clear to auscultation bilaterally, respirations unlabored   Heart:  Regular rate and rhythm, S1 and S2 Encouraged regular exercise and healthy eating. Essential hypertension- BP controlled on med. Continue. Vitamin D deficiency- decrease dose of vitamin D to 4000IU daily.     Localized osteoporosis without current pathological fracture, elevated pa Screen every 10 years No results found for this or any previous visit. No flowsheet data found. Fecal Occult Blood Annually No results found for: FOB No flowsheet data found.     Glaucoma Screening      Ophthalmology Visit Annually: Diabetics, FHx [place] your pharmacy  prescription benefits      SPECIFIC DISEASE MONITORING Internal Lab or Procedure External Lab or Procedure      Annual Monitoring of Persistent     Medications (ACE/ARB, digoxin diuretics, anticonvulsants.)    Potassium  Annually Potassium (

## 2021-02-01 NOTE — PATIENT INSTRUCTIONS
Decrease your vitamin D to 4000IU daily. Follow-up with Dr. Alanis Garner. Jana Mei's SCREENING SCHEDULE   Tests on this list are recommended by your physician but may not be covered, or covered at this frequency, by your insurer.  Please check with who meet one of the following criteria:   • Men who are 73-68 years old and have smoked more than 100 cigarettes in their lifetime   • Anyone with a family history    Colorectal Cancer Screening  Covered up to Age 76     Colonoscopy Screen   Covered every regularly   Immunizations      Influenza  Covered Annually No orders found for this or any previous visit. Please get every year    Pneumococcal 13 (Prevnar)  Covered Once after 65 No orders found for this or any previous visit.  Please get once after your

## 2021-02-04 DIAGNOSIS — R42 VERTIGO: ICD-10-CM

## 2021-02-04 DIAGNOSIS — I10 ESSENTIAL HYPERTENSION: ICD-10-CM

## 2021-02-04 DIAGNOSIS — K21.9 GASTROESOPHAGEAL REFLUX DISEASE WITHOUT ESOPHAGITIS: ICD-10-CM

## 2021-02-04 RX ORDER — RALOXIFENE HYDROCHLORIDE 60 MG/1
TABLET, FILM COATED ORAL
Qty: 90 TABLET | Refills: 1 | Status: SHIPPED | OUTPATIENT
Start: 2021-02-04 | End: 2021-08-23

## 2021-02-04 RX ORDER — LISINOPRIL 10 MG/1
10 TABLET ORAL DAILY
Qty: 90 TABLET | Refills: 1 | Status: SHIPPED | OUTPATIENT
Start: 2021-02-04 | End: 2022-01-11

## 2021-02-04 RX ORDER — OMEPRAZOLE 20 MG/1
20 CAPSULE, DELAYED RELEASE ORAL EVERY MORNING
Qty: 90 CAPSULE | Refills: 1 | Status: SHIPPED | OUTPATIENT
Start: 2021-02-04 | End: 2021-09-07

## 2021-02-04 RX ORDER — DESONIDE 0.5 MG/ML
LOTION TOPICAL
Qty: 118 ML | Refills: 3 | Status: SHIPPED | OUTPATIENT
Start: 2021-02-04 | End: 2021-09-07

## 2021-02-04 RX ORDER — MECLIZINE HYDROCHLORIDE 25 MG/1
25 TABLET ORAL 3 TIMES DAILY PRN
Qty: 30 TABLET | Refills: 1 | Status: SHIPPED | OUTPATIENT
Start: 2021-02-04 | End: 2021-07-28

## 2021-02-04 NOTE — TELEPHONE ENCOUNTER
Prescription Refill Request - Patient advised can take 48-72 hours.     Name of Medication (strength, dose, qty requested:   Desonide 0.05 % External Lotion 118 mL 3 9/10/2020    Sig:   APPLY ONE APPLICATION TO AFFECTED AREA TWICE DAILY       hydrocortisone

## 2021-02-04 NOTE — TELEPHONE ENCOUNTER
Last visit-  02/01/2021 cpe    Last refill-  01/14/2021 lisinopril 10mg QTY90 1R,  09/10/2020 desonide 0.05% external lotion [id number] 3R,  07/27/2020 hydrocortisone 2.5% external cream QTY28g 1R,  07/27/2020 hydrocortisone 2.5% external ointment [id number] 1

## 2021-07-27 DIAGNOSIS — R42 VERTIGO: ICD-10-CM

## 2021-07-28 RX ORDER — MECLIZINE HYDROCHLORIDE 25 MG/1
25 TABLET ORAL 3 TIMES DAILY PRN
Qty: 30 TABLET | Refills: 0 | Status: SHIPPED | OUTPATIENT
Start: 2021-07-28 | End: 2021-10-20

## 2021-07-28 NOTE — TELEPHONE ENCOUNTER
Last visit- 02/01/2021 cpe    Last refill-  02/04/2021 meclizine hcl 25mg QTY30 1R    Last labs-  01/19/2021 t4 free, pth, vitamin d, cmp, lipid, tsh, cbc  Future Appointments   Date Time Provider Lluvia Irene   8/2/2021  9:00 AM Leighann Felix DO E

## 2021-08-02 ENCOUNTER — LAB ENCOUNTER (OUTPATIENT)
Dept: LAB | Age: 76
End: 2021-08-02
Attending: INTERNAL MEDICINE
Payer: MEDICARE

## 2021-08-02 ENCOUNTER — OFFICE VISIT (OUTPATIENT)
Dept: INTERNAL MEDICINE CLINIC | Facility: CLINIC | Age: 76
End: 2021-08-02
Payer: MEDICARE

## 2021-08-02 VITALS
HEART RATE: 84 BPM | OXYGEN SATURATION: 98 % | DIASTOLIC BLOOD PRESSURE: 70 MMHG | SYSTOLIC BLOOD PRESSURE: 136 MMHG | WEIGHT: 110.38 LBS | HEIGHT: 62 IN | TEMPERATURE: 98 F | BODY MASS INDEX: 20.31 KG/M2

## 2021-08-02 DIAGNOSIS — E34.9 ELEVATED PARATHYROID HORMONE: ICD-10-CM

## 2021-08-02 DIAGNOSIS — R53.83 FATIGUE, UNSPECIFIED TYPE: ICD-10-CM

## 2021-08-02 DIAGNOSIS — I10 ESSENTIAL HYPERTENSION: Primary | ICD-10-CM

## 2021-08-02 DIAGNOSIS — M81.6 LOCALIZED OSTEOPOROSIS WITHOUT CURRENT PATHOLOGICAL FRACTURE: ICD-10-CM

## 2021-08-02 DIAGNOSIS — E03.8 SUBCLINICAL HYPOTHYROIDISM: ICD-10-CM

## 2021-08-02 LAB — TSI SER-ACNC: 2.53 MIU/ML (ref 0.36–3.74)

## 2021-08-02 PROCEDURE — 84443 ASSAY THYROID STIM HORMONE: CPT

## 2021-08-02 PROCEDURE — 36415 COLL VENOUS BLD VENIPUNCTURE: CPT

## 2021-08-02 PROCEDURE — 99214 OFFICE O/P EST MOD 30 MIN: CPT | Performed by: FAMILY MEDICINE

## 2021-08-02 NOTE — PROGRESS NOTES
HPI/Subjective:   Patient ID: Radha Marques is a 64year old female. HPI Here for f/u on BP. Patient is taking med as prescribed with no issues. Does note feeling more tired than usual. Feels like she has to have 3 cups of coffee to stay awake. Allergies:  Bisphosphonates         OTHER (SEE COMMENTS)    Comment:Sig bone pain    Objective:   Physical Exam  Vitals reviewed. Constitutional:       Appearance: Normal appearance. She is well-developed.    HENT:      Head: Normocephalic and atrauma

## 2021-08-23 RX ORDER — RALOXIFENE HYDROCHLORIDE 60 MG/1
TABLET, FILM COATED ORAL
Qty: 90 TABLET | Refills: 0 | Status: SHIPPED | OUTPATIENT
Start: 2021-08-23 | End: 2021-12-06

## 2021-08-23 NOTE — TELEPHONE ENCOUNTER
Last visit- 08/02/2021 hypertension     Last refill- 02/04/2021 raloxifene hcl 60mg QTY90 1R    Last labs- 01/19/2021 t4 free, pth, vitamin d, cmp, lipid, cbc, tsh  Future Appointments   Date Time Provider Lluvia Irene   10/4/2021  2:30 PM Tre Lee

## 2021-09-07 DIAGNOSIS — K21.9 GASTROESOPHAGEAL REFLUX DISEASE WITHOUT ESOPHAGITIS: ICD-10-CM

## 2021-09-07 RX ORDER — OMEPRAZOLE 20 MG/1
20 CAPSULE, DELAYED RELEASE ORAL EVERY MORNING
Qty: 90 CAPSULE | Refills: 0 | Status: SHIPPED | OUTPATIENT
Start: 2021-09-07 | End: 2021-12-06

## 2021-09-07 RX ORDER — DESONIDE 0.5 MG/ML
LOTION TOPICAL
Qty: 118 ML | Refills: 0 | Status: SHIPPED | OUTPATIENT
Start: 2021-09-07 | End: 2021-10-20

## 2021-09-07 NOTE — TELEPHONE ENCOUNTER
Last visit- 08/02/2021 hypertension     Last refill- 02/04/2021 omeprazole 20mg QTY90 1R,  02/04/2021 desonide 0.05% external lotion [id number] 3R    Last labs- 01/19/2021 t4 free, pth, vitamin d, cmp, lipid, tsh, cbc  Future Appointments   Date Time Provide

## 2021-10-19 DIAGNOSIS — R42 VERTIGO: ICD-10-CM

## 2021-10-19 NOTE — TELEPHONE ENCOUNTER
Last visit- 08/02/2021 hypertension     Last refill- 07/28/2021 meclizine hcl 25mg QTY30 0R,  09/07/2021 desonide 0.05% external lotion [id number] 0R    Last labs- 01/19/2021 t4 free, pth, vitamin d, cmp, lipid, tsh, cbc  Future Appointments   Date Time Prov

## 2021-10-20 RX ORDER — DESONIDE 0.5 MG/ML
LOTION TOPICAL
Qty: 118 ML | Refills: 0 | Status: SHIPPED | OUTPATIENT
Start: 2021-10-20 | End: 2021-12-06

## 2021-10-20 RX ORDER — MECLIZINE HYDROCHLORIDE 25 MG/1
TABLET ORAL
Qty: 30 TABLET | Refills: 0 | Status: SHIPPED | OUTPATIENT
Start: 2021-10-20 | End: 2021-12-06

## 2021-11-30 ENCOUNTER — HOSPITAL ENCOUNTER (EMERGENCY)
Facility: HOSPITAL | Age: 76
Discharge: HOME | End: 2021-11-30
Payer: MEDICARE

## 2021-11-30 ENCOUNTER — APPOINTMENT (OUTPATIENT)
Dept: GENERAL RADIOLOGY | Facility: HOSPITAL | Age: 76
End: 2021-11-30
Payer: MEDICARE

## 2021-11-30 VITALS
TEMPERATURE: 97 F | OXYGEN SATURATION: 99 % | SYSTOLIC BLOOD PRESSURE: 155 MMHG | RESPIRATION RATE: 16 BRPM | HEIGHT: 62 IN | DIASTOLIC BLOOD PRESSURE: 83 MMHG | WEIGHT: 105 LBS | HEART RATE: 82 BPM | BODY MASS INDEX: 19.32 KG/M2

## 2021-11-30 DIAGNOSIS — S60.221A CONTUSION OF RIGHT HAND, INITIAL ENCOUNTER: ICD-10-CM

## 2021-11-30 DIAGNOSIS — M19.041 PRIMARY OSTEOARTHRITIS OF RIGHT HAND: Primary | ICD-10-CM

## 2021-11-30 PROCEDURE — 99283 EMERGENCY DEPT VISIT LOW MDM: CPT

## 2021-11-30 PROCEDURE — 73130 X-RAY EXAM OF HAND: CPT

## 2021-11-30 NOTE — ED INITIAL ASSESSMENT (HPI)
PT tripped over curb and caught self with R hand. PT denies hitting the floor with any other body part. Denies blood thinner use. PT then went to work and the repetitive motions increased pain in hand.

## 2021-12-01 NOTE — ED PROVIDER NOTES
Patient Seen in: [facility] Emergency Department      History   Patient presents with:  Arm or Hand Injury    Stated Complaint: right hard injury     Subjective:   HPI    Patient is a pleasant 73-year-old female, right-hand-dominant, presenting for Physical Exam    Vital signs noted. GENERAL: Patient is awake and alert, resting comfortably on the cart, in no apparent distress. HEENT: Head is without evidence of trauma. Extraocular muscles are intact. NECK: Neck is supple.  The trachea is tolerated. Ibuprofen should be taken as needed, as directed. Close outpatient follow-up with a primary care physician was recommended. Additional evaluation and intervention may be required, depending on the patient's clinical course.   Patient was i

## 2021-12-06 ENCOUNTER — OFFICE VISIT (OUTPATIENT)
Dept: INTERNAL MEDICINE CLINIC | Facility: CLINIC | Age: 76
End: 2021-12-06
Payer: MEDICARE

## 2021-12-06 VITALS
SYSTOLIC BLOOD PRESSURE: 132 MMHG | WEIGHT: 114.63 LBS | DIASTOLIC BLOOD PRESSURE: 70 MMHG | HEART RATE: 77 BPM | HEIGHT: 62 IN | OXYGEN SATURATION: 97 % | BODY MASS INDEX: 21.1 KG/M2

## 2021-12-06 DIAGNOSIS — M15.9 OSTEOARTHRITIS OF MULTIPLE JOINTS, UNSPECIFIED OSTEOARTHRITIS TYPE: ICD-10-CM

## 2021-12-06 DIAGNOSIS — K62.9 RECTAL ABNORMALITY: ICD-10-CM

## 2021-12-06 DIAGNOSIS — S60.221D CONTUSION OF RIGHT HAND, SUBSEQUENT ENCOUNTER: Primary | ICD-10-CM

## 2021-12-06 PROCEDURE — 99214 OFFICE O/P EST MOD 30 MIN: CPT | Performed by: FAMILY MEDICINE

## 2021-12-06 RX ORDER — OMEPRAZOLE 20 MG/1
20 CAPSULE, DELAYED RELEASE ORAL EVERY MORNING
Qty: 90 CAPSULE | Refills: 3 | Status: SHIPPED | OUTPATIENT
Start: 2021-12-06

## 2021-12-06 RX ORDER — MECLIZINE HYDROCHLORIDE 25 MG/1
25 TABLET ORAL 3 TIMES DAILY PRN
Qty: 30 TABLET | Refills: 0 | Status: SHIPPED | OUTPATIENT
Start: 2021-12-06 | End: 2022-01-11

## 2021-12-06 RX ORDER — RALOXIFENE HYDROCHLORIDE 60 MG/1
60 TABLET, FILM COATED ORAL DAILY
Qty: 90 TABLET | Refills: 3 | Status: SHIPPED | OUTPATIENT
Start: 2021-12-06

## 2021-12-06 RX ORDER — DESONIDE 0.5 MG/ML
1 LOTION TOPICAL 2 TIMES DAILY
Qty: 118 ML | Refills: 1 | Status: SHIPPED | OUTPATIENT
Start: 2021-12-06

## 2021-12-06 NOTE — PROGRESS NOTES
Subjective:   Patient ID: Debbie Rendon is a 64year old female. HPI Here for f/u from ER on 11/30/21 after a trip and fall where she hit her right hand. Did not hit head. Had negative xray. Hand pain is resolving.  Was seeing Rheum a long time ago daily as needed. 60 g 0   • Multiple Vitamins-Minerals (CENTRUM) Oral Tab Take 1 tablet by mouth daily. Allergies:  Bisphosphonates         OTHER (SEE COMMENTS)    Comment:Sig bone pain    Objective:   Physical Exam  Vitals reviewed.    Constitutional

## 2021-12-13 ENCOUNTER — TELEPHONE (OUTPATIENT)
Dept: INTERNAL MEDICINE CLINIC | Facility: CLINIC | Age: 76
End: 2021-12-13

## 2021-12-13 DIAGNOSIS — M15.8 OTHER OSTEOARTHRITIS INVOLVING MULTIPLE JOINTS: Primary | ICD-10-CM

## 2021-12-13 NOTE — TELEPHONE ENCOUNTER
Patient sent a letter stating she recalls seeing Dr. Jorge Campos in the past and would like to return to him. Referral placed. Please call patient to let her know I placed referral for Dr. Jorge Campos.

## 2022-01-04 ENCOUNTER — TELEPHONE (OUTPATIENT)
Dept: INTERNAL MEDICINE CLINIC | Facility: CLINIC | Age: 77
End: 2022-01-04

## 2022-01-04 DIAGNOSIS — I10 ESSENTIAL HYPERTENSION: ICD-10-CM

## 2022-01-04 DIAGNOSIS — Z00.00 ENCOUNTER FOR ANNUAL HEALTH EXAMINATION: ICD-10-CM

## 2022-01-04 DIAGNOSIS — E55.9 VITAMIN D DEFICIENCY: Primary | ICD-10-CM

## 2022-01-04 DIAGNOSIS — Z13.29 SCREENING FOR THYROID DISORDER: ICD-10-CM

## 2022-01-04 NOTE — TELEPHONE ENCOUNTER
Future Appointments   Date Time Provider Lluvia Irene   2/7/2022 10:00 AM Barbara Melton DO [facility]     Orders to  THE [facility]  aware must fast no call back required

## 2022-01-11 DIAGNOSIS — I10 ESSENTIAL HYPERTENSION: ICD-10-CM

## 2022-01-11 RX ORDER — LISINOPRIL 10 MG/1
10 TABLET ORAL DAILY
Qty: 90 TABLET | Refills: 0 | Status: SHIPPED | OUTPATIENT
Start: 2022-01-11

## 2022-01-11 RX ORDER — MECLIZINE HYDROCHLORIDE 25 MG/1
25 TABLET ORAL 3 TIMES DAILY PRN
Qty: 30 TABLET | Refills: 0 | Status: SHIPPED | OUTPATIENT
Start: 2022-01-11

## 2022-01-11 NOTE — TELEPHONE ENCOUNTER
Last visit- 12/06/2021 contusion of right hand     Last refill- 02/04/2021 lisinopril 10mg QTY90 1R,  12/06/2021 meclizine 25mg QTY30 0R    Last labs- 01/19/2021 t4 free, pth, vitamin d, cmp, lipid, tsh, cbc  Future Appointments   Date Time Provider Depart

## 2022-01-31 ENCOUNTER — HOSPITAL ENCOUNTER (OUTPATIENT)
Dept: BONE DENSITY | Age: 77
Discharge: HOME | End: 2022-01-31
Attending: INTERNAL MEDICINE
Payer: MEDICARE

## 2022-01-31 ENCOUNTER — LAB ENCOUNTER (OUTPATIENT)
Dept: LAB | Age: 77
End: 2022-01-31
Attending: INTERNAL MEDICINE
Payer: MEDICARE

## 2022-01-31 DIAGNOSIS — I10 ESSENTIAL HYPERTENSION: ICD-10-CM

## 2022-01-31 DIAGNOSIS — Z00.00 ENCOUNTER FOR ANNUAL HEALTH EXAMINATION: ICD-10-CM

## 2022-01-31 DIAGNOSIS — Z13.29 SCREENING FOR THYROID DISORDER: ICD-10-CM

## 2022-01-31 DIAGNOSIS — E55.9 VITAMIN D DEFICIENCY: ICD-10-CM

## 2022-01-31 DIAGNOSIS — M81.6 LOCALIZED OSTEOPOROSIS WITHOUT CURRENT PATHOLOGICAL FRACTURE: ICD-10-CM

## 2022-01-31 DIAGNOSIS — K21.9 GASTROESOPHAGEAL REFLUX DISEASE WITHOUT ESOPHAGITIS: ICD-10-CM

## 2022-01-31 LAB
ALBUMIN SERPL-MCNC: 3.8 G/DL (ref 3.4–5)
ALBUMIN/GLOB SERPL: 1.3 {RATIO} (ref 1–2)
ALP LIVER SERPL-CCNC: 66 U/L
ALT SERPL-CCNC: 32 U/L
ANION GAP SERPL CALC-SCNC: 4 MMOL/L (ref 0–18)
AST SERPL-CCNC: 30 U/L (ref 15–37)
BASOPHILS # BLD AUTO: 0.09 X10(3) UL (ref 0–0.2)
BASOPHILS NFR BLD AUTO: 1.5 %
BILIRUB SERPL-MCNC: 0.6 MG/DL (ref 0.1–2)
BUN BLD-MCNC: 13 MG/DL (ref 7–18)
CALCIUM BLD-MCNC: 9.7 MG/DL (ref 8.5–10.1)
CHLORIDE SERPL-SCNC: 107 MMOL/L (ref 98–112)
CHOLEST SERPL-MCNC: 174 MG/DL (ref ?–200)
CO2 SERPL-SCNC: 29 MMOL/L (ref 21–32)
CREAT BLD-MCNC: 0.66 MG/DL
EOSINOPHIL # BLD AUTO: 0.21 X10(3) UL (ref 0–0.7)
EOSINOPHIL NFR BLD AUTO: 3.6 %
ERYTHROCYTE [DISTWIDTH] IN BLOOD BY AUTOMATED COUNT: 13.1 %
FASTING PATIENT LIPID ANSWER: YES
FASTING STATUS PATIENT QL REPORTED: YES
GLOBULIN PLAS-MCNC: 3 G/DL (ref 2.8–4.4)
GLUCOSE BLD-MCNC: 87 MG/DL (ref 70–99)
HCT VFR BLD AUTO: 40.3 %
HDLC SERPL-MCNC: 84 MG/DL (ref 40–59)
HGB BLD-MCNC: 12.7 G/DL
IMM GRANULOCYTES # BLD AUTO: 0.01 X10(3) UL (ref 0–1)
IMM GRANULOCYTES NFR BLD: 0.2 %
LDLC SERPL CALC-MCNC: 79 MG/DL (ref ?–100)
LYMPHOCYTES # BLD AUTO: 1.53 X10(3) UL (ref 1–4)
LYMPHOCYTES NFR BLD AUTO: 26.2 %
MCH RBC QN AUTO: 31.1 PG (ref 26–34)
MCHC RBC AUTO-ENTMCNC: 31.5 G/DL (ref 31–37)
MCV RBC AUTO: 98.8 FL
MONOCYTES # BLD AUTO: 0.44 X10(3) UL (ref 0.1–1)
MONOCYTES NFR BLD AUTO: 7.5 %
NEUTROPHILS # BLD AUTO: 3.55 X10 (3) UL (ref 1.5–7.7)
NEUTROPHILS # BLD AUTO: 3.55 X10(3) UL (ref 1.5–7.7)
NEUTROPHILS NFR BLD AUTO: 61 %
NONHDLC SERPL-MCNC: 90 MG/DL (ref ?–130)
OSMOLALITY SERPL CALC.SUM OF ELEC: 289 MOSM/KG (ref 275–295)
PLATELET # BLD AUTO: 329 10(3)UL (ref 150–450)
POTASSIUM SERPL-SCNC: 4.5 MMOL/L (ref 3.5–5.1)
PROT SERPL-MCNC: 6.8 G/DL (ref 6.4–8.2)
RBC # BLD AUTO: 4.08 X10(6)UL
SODIUM SERPL-SCNC: 140 MMOL/L (ref 136–145)
TRIGL SERPL-MCNC: 58 MG/DL (ref 30–149)
TSI SER-ACNC: 2.67 MIU/ML (ref 0.36–3.74)
VIT D+METAB SERPL-MCNC: 62.8 NG/ML (ref 30–100)
VLDLC SERPL CALC-MCNC: 9 MG/DL (ref 0–30)
WBC # BLD AUTO: 5.8 X10(3) UL (ref 4–11)

## 2022-01-31 PROCEDURE — 80061 LIPID PANEL: CPT

## 2022-01-31 PROCEDURE — 80053 COMPREHEN METABOLIC PANEL: CPT

## 2022-01-31 PROCEDURE — 77080 DXA BONE DENSITY AXIAL: CPT | Performed by: INTERNAL MEDICINE

## 2022-01-31 PROCEDURE — 36415 COLL VENOUS BLD VENIPUNCTURE: CPT

## 2022-01-31 PROCEDURE — 84443 ASSAY THYROID STIM HORMONE: CPT

## 2022-01-31 PROCEDURE — 85025 COMPLETE CBC W/AUTO DIFF WBC: CPT

## 2022-01-31 PROCEDURE — 82306 VITAMIN D 25 HYDROXY: CPT

## 2022-02-07 ENCOUNTER — OFFICE VISIT (OUTPATIENT)
Dept: INTERNAL MEDICINE CLINIC | Facility: CLINIC | Age: 77
End: 2022-02-07
Payer: MEDICARE

## 2022-02-07 VITALS
HEIGHT: 61.42 IN | BODY MASS INDEX: 21.62 KG/M2 | WEIGHT: 116 LBS | HEART RATE: 72 BPM | OXYGEN SATURATION: 97 % | SYSTOLIC BLOOD PRESSURE: 120 MMHG | DIASTOLIC BLOOD PRESSURE: 60 MMHG

## 2022-02-07 DIAGNOSIS — R51.9 OCCIPITAL HEADACHE: ICD-10-CM

## 2022-02-07 DIAGNOSIS — K21.9 GASTROESOPHAGEAL REFLUX DISEASE WITHOUT ESOPHAGITIS: ICD-10-CM

## 2022-02-07 DIAGNOSIS — Z00.00 ENCOUNTER FOR ANNUAL HEALTH EXAMINATION: Primary | ICD-10-CM

## 2022-02-07 DIAGNOSIS — E55.9 VITAMIN D DEFICIENCY: ICD-10-CM

## 2022-02-07 DIAGNOSIS — M19.042 OSTEOARTHRITIS OF BOTH HANDS, UNSPECIFIED OSTEOARTHRITIS TYPE: ICD-10-CM

## 2022-02-07 DIAGNOSIS — M81.6 LOCALIZED OSTEOPOROSIS WITHOUT CURRENT PATHOLOGICAL FRACTURE: ICD-10-CM

## 2022-02-07 DIAGNOSIS — R42 VERTIGO: ICD-10-CM

## 2022-02-07 DIAGNOSIS — I10 ESSENTIAL HYPERTENSION: ICD-10-CM

## 2022-02-07 DIAGNOSIS — M19.041 OSTEOARTHRITIS OF BOTH HANDS, UNSPECIFIED OSTEOARTHRITIS TYPE: ICD-10-CM

## 2022-02-07 PROCEDURE — G0439 PPPS, SUBSEQ VISIT: HCPCS | Performed by: FAMILY MEDICINE

## 2022-02-21 PROBLEM — M05.79 RHEUMATOID ARTHRITIS INVOLVING MULTIPLE SITES WITH POSITIVE RHEUMATOID FACTOR (HCC): Status: ACTIVE | Noted: 2022-02-21

## 2022-03-01 RX ORDER — DESONIDE 0.5 MG/ML
LOTION TOPICAL
Qty: 118 ML | Refills: 0 | Status: SHIPPED | OUTPATIENT
Start: 2022-03-01 | End: 2022-03-29

## 2022-03-01 NOTE — TELEPHONE ENCOUNTER
Last visit- 02/07/2022 cpe     Last refill- 12/06/2021 desonide 0.05% external lotion [id number] 1R    Last labs- 01/31/2022 tsh, lipid, cmp, cbc  Future Appointments   Date Time Provider Lluvia Irene   5/2/2022  3:10 PM Fab Tran MD [facility] [place]   8/8/2022 10:00 AM Rand Chahal,  [facility] [facility]

## 2022-03-21 ENCOUNTER — APPOINTMENT (OUTPATIENT)
Dept: GENERAL RADIOLOGY | Facility: HOSPITAL | Age: 77
End: 2022-03-21
Payer: MEDICARE

## 2022-03-21 ENCOUNTER — HOSPITAL ENCOUNTER (EMERGENCY)
Facility: HOSPITAL | Age: 77
Discharge: HOME | End: 2022-03-21
Attending: EMERGENCY MEDICINE
Payer: MEDICARE

## 2022-03-21 VITALS
RESPIRATION RATE: 18 BRPM | OXYGEN SATURATION: 97 % | SYSTOLIC BLOOD PRESSURE: 141 MMHG | TEMPERATURE: 98 F | HEART RATE: 66 BPM | DIASTOLIC BLOOD PRESSURE: 77 MMHG

## 2022-03-21 DIAGNOSIS — S62.647A CLOSED NONDISPLACED FRACTURE OF PROXIMAL PHALANX OF LEFT LITTLE FINGER, INITIAL ENCOUNTER: Primary | ICD-10-CM

## 2022-03-21 PROCEDURE — 99283 EMERGENCY DEPT VISIT LOW MDM: CPT

## 2022-03-21 PROCEDURE — 73110 X-RAY EXAM OF WRIST: CPT

## 2022-03-21 PROCEDURE — 73130 X-RAY EXAM OF HAND: CPT

## 2022-03-21 PROCEDURE — 29130 APPL FINGER SPLINT STATIC: CPT

## 2022-03-21 NOTE — ED INITIAL ASSESSMENT (HPI)
Pt to the ER from home d/t left hand swelling. Pt states she used her hand last night to catch herself when going to the bathroom and woke up this morning to it swollen. Pt is able to  Move all fingers and wrist with ease. Pt presents to the ER a&ox4. Skin warm and dry.

## 2022-03-21 NOTE — ED QUICK NOTES
Pt resting on cart. + CMS after splint placement to L hand. DC papers provided to patient. Aware of f/u care and cond to return to the ED with.  Home with steady gait

## 2022-03-23 ENCOUNTER — TELEPHONE (OUTPATIENT)
Dept: INTERNAL MEDICINE CLINIC | Facility: CLINIC | Age: 77
End: 2022-03-23

## 2022-03-28 NOTE — TELEPHONE ENCOUNTER
Last visit- 02/07/2022 cpe    Last refill- 03/01/2022 desonide 0.05% external lotion [id number] 0R    Last labs- 01/31/2022 tsh, lipid, mp, cbc, vitamin d  Future Appointments   Date Time Provider Lluvia Maria Victoria   4/18/2022  9:00 AM MD Jordy Toure [address] [facility]   5/2/2022  3:10 PM Josiane Peters MD [facility] [place]   8/8/2022 10:00 AM Yeison Reid DO [facility]

## 2022-03-29 RX ORDER — DESONIDE 0.5 MG/ML
LOTION TOPICAL
Qty: 118 ML | Refills: 0 | Status: SHIPPED | OUTPATIENT
Start: 2022-03-29

## 2022-04-06 ENCOUNTER — TELEPHONE (OUTPATIENT)
Dept: SURGERY | Facility: CLINIC | Age: 77
End: 2022-04-06

## 2022-04-14 RX ORDER — LISINOPRIL 10 MG/1
TABLET ORAL
Qty: 90 TABLET | Refills: 0 | Status: SHIPPED | OUTPATIENT
Start: 2022-04-14

## 2022-04-14 NOTE — TELEPHONE ENCOUNTER
Last visit- 02/07/2022 cpe     Last refill- 01/11/2022 lisinopril 10mg QTY90 0R,  02/04/2021 hydrocortisone 2.5% external cream QTY28g 1R    Last labs- 01/31/2022 tsh, lipid, cmp, cbc  Future Appointments   Date Time Provider Lluvia Irene   5/16/2022 10:00 AM Tomas Ness MD [facility] [place]   9/19/2022  9:00 AM Ramya Fowler DO [facility] [facility]

## 2022-05-13 ENCOUNTER — TELEPHONE (OUTPATIENT)
Dept: INTERNAL MEDICINE CLINIC | Facility: CLINIC | Age: 77
End: 2022-05-13

## 2022-05-13 NOTE — TELEPHONE ENCOUNTER
Pt is calling to check on the status she stated ok to communicate via [employer] if its better for us.

## 2022-05-13 NOTE — TELEPHONE ENCOUNTER
Pt is calling to check on the status she stated ok to communicate via [employer] if its better for us.

## 2022-05-13 NOTE — TELEPHONE ENCOUNTER
Pt stated she got a referral but isn't able to see them as they weren't available and will see a different provider from the same group. Pt wants to be sure the appt on Monday will be covered. Pt couldn't talk as she was at work and hung up.  Pt is scheduled with Get Rivera MD

## 2022-05-13 NOTE — TELEPHONE ENCOUNTER
Please see other TE. LMTCB 5/13. [facility] message also sent to pt for sxs update.

## 2022-05-13 NOTE — TELEPHONE ENCOUNTER
Pt stated the below medication isnt working for her and wants a call back. Pt was in a hurry and hung up, wasn't able to get more info    omeprazole 20 MG Oral Capsule Delayed Release 90 capsule 3 12/6/2021     Sig - Route:  Take 1 capsule (20 mg total) by mouth every morning. - Oral

## 2022-05-16 ENCOUNTER — OFFICE VISIT (OUTPATIENT)
Dept: NEUROLOGY | Facility: CLINIC | Age: 77
End: 2022-05-16
Payer: MEDICARE

## 2022-05-16 VITALS — WEIGHT: 110 LBS | OXYGEN SATURATION: 100 % | RESPIRATION RATE: 16 BRPM | HEIGHT: 61 IN | BODY MASS INDEX: 20.77 KG/M2

## 2022-05-16 DIAGNOSIS — R93.7 ABNORMAL CT SCAN, CERVICAL SPINE: ICD-10-CM

## 2022-05-16 DIAGNOSIS — M54.2 NECK PAIN: Primary | ICD-10-CM

## 2022-05-16 DIAGNOSIS — R29.898 DECREASED RANGE OF MOTION OF NECK: ICD-10-CM

## 2022-05-16 PROCEDURE — 99204 OFFICE O/P NEW MOD 45 MIN: CPT | Performed by: HOSPITALIST

## 2022-05-16 RX ORDER — MECLIZINE HYDROCHLORIDE 25 MG/1
TABLET ORAL
Qty: 30 TABLET | Refills: 0 | Status: SHIPPED | OUTPATIENT
Start: 2022-05-16

## 2022-05-16 NOTE — TELEPHONE ENCOUNTER
Last visit- 02/07/2022 cpe     Last refill- 01/11/2022 meclizine 25mg QTY30 0R    Last labs- 01/31/2022 tsh, lipid, cmp, cbc, vitamin d  Future Appointments   Date Time Provider Lluvia Irene   6/20/2022  9:00 AM Marisela Gibbons MD [facility] [place]   9/19/2022  9:00 AM Bianka Lara,  [facility] [facility]

## 2022-05-17 ENCOUNTER — TELEPHONE (OUTPATIENT)
Dept: INTERNAL MEDICINE CLINIC | Facility: CLINIC | Age: 77
End: 2022-05-17

## 2022-05-17 NOTE — TELEPHONE ENCOUNTER
Pt stated her acid reflux is getting worse she works 6 days out of the week and she is calling to see if there is anything she can take or do prior to her next available appt.  She requested 5/31/22   Future Appointments   Date Time Provider Lluvia Irene   5/31/2022 11:00 AM Jemal Brooks, DO [facility]

## 2022-05-18 RX ORDER — HYDROXYCHLOROQUINE SULFATE 200 MG/1
TABLET, FILM COATED ORAL
COMMUNITY
Start: 2022-05-16 | End: 2023-08-02

## 2022-05-23 ENCOUNTER — HOSPITAL ENCOUNTER (OUTPATIENT)
Dept: MRI IMAGING | Facility: HOSPITAL | Age: 77
Discharge: HOME | End: 2022-05-23
Attending: HOSPITALIST
Payer: MEDICARE

## 2022-05-23 DIAGNOSIS — M54.2 NECK PAIN: ICD-10-CM

## 2022-05-23 DIAGNOSIS — R29.898 DECREASED RANGE OF MOTION OF NECK: ICD-10-CM

## 2022-05-23 DIAGNOSIS — R93.7 ABNORMAL CT SCAN, CERVICAL SPINE: ICD-10-CM

## 2022-05-23 PROCEDURE — A9575 INJ GADOTERATE MEGLUMI 0.1ML: HCPCS | Performed by: HOSPITALIST

## 2022-05-23 PROCEDURE — 72156 MRI NECK SPINE W/O & W/DYE: CPT | Performed by: HOSPITALIST

## 2022-05-31 RX ORDER — DESONIDE 0.5 MG/ML
LOTION TOPICAL
Qty: 118 ML | Refills: 0 | Status: SHIPPED | OUTPATIENT
Start: 2022-05-31

## 2022-05-31 NOTE — TELEPHONE ENCOUNTER
Last visit- 02/07/2022 cpe     Last refill- 03/29/2022 desonide 0.05% external lotion [id number] 0R    Last labs- 01/31/2022 tsh, lipid, cmp, cbc, vitamin d  Future Appointments   Date Time Provider Lluvia Irene   6/20/2022  9:00 AM Scooby Davidson MD [facility] [place]   9/19/2022  9:00 AM Victoria Gueavra DO [facility]

## 2022-06-20 ENCOUNTER — OFFICE VISIT (OUTPATIENT)
Dept: NEUROLOGY | Facility: CLINIC | Age: 77
End: 2022-06-20
Payer: MEDICARE

## 2022-06-20 VITALS
DIASTOLIC BLOOD PRESSURE: 70 MMHG | SYSTOLIC BLOOD PRESSURE: 128 MMHG | BODY MASS INDEX: 20.39 KG/M2 | HEIGHT: 61 IN | WEIGHT: 108 LBS | HEART RATE: 61 BPM | RESPIRATION RATE: 16 BRPM

## 2022-06-20 DIAGNOSIS — M54.2 NECK PAIN: Primary | ICD-10-CM

## 2022-06-20 DIAGNOSIS — R29.898 DECREASED RANGE OF MOTION OF NECK: ICD-10-CM

## 2022-07-11 ENCOUNTER — TELEPHONE (OUTPATIENT)
Dept: PHYSICAL THERAPY | Facility: HOSPITAL | Age: 77
End: 2022-07-11

## 2022-08-01 ENCOUNTER — OFFICE VISIT (OUTPATIENT)
Dept: PHYSICAL THERAPY | Age: 77
End: 2022-08-01
Attending: HOSPITALIST
Payer: MEDICARE

## 2022-08-01 DIAGNOSIS — I10 ESSENTIAL HYPERTENSION: ICD-10-CM

## 2022-08-01 DIAGNOSIS — M54.2 NECK PAIN: ICD-10-CM

## 2022-08-01 PROCEDURE — 97110 THERAPEUTIC EXERCISES: CPT

## 2022-08-01 PROCEDURE — 97161 PT EVAL LOW COMPLEX 20 MIN: CPT

## 2022-08-02 RX ORDER — MECLIZINE HYDROCHLORIDE 25 MG/1
TABLET ORAL
Qty: 30 TABLET | Refills: 0 | Status: SHIPPED | OUTPATIENT
Start: 2022-08-02

## 2022-08-02 RX ORDER — DESONIDE 0.5 MG/ML
LOTION TOPICAL
Qty: 118 ML | Refills: 0 | Status: SHIPPED | OUTPATIENT
Start: 2022-08-02

## 2022-08-02 RX ORDER — LISINOPRIL 10 MG/1
TABLET ORAL
Qty: 90 TABLET | Refills: 0 | Status: SHIPPED | OUTPATIENT
Start: 2022-08-02

## 2022-08-02 NOTE — TELEPHONE ENCOUNTER
Last visit- 02/07/2022 cpe     Last refill- 05/31/2022 desonide 0.05% external lotion [id number] 0R,  05/16/2022 meclizine 25mg QTY30 0R,  04/14/2022 lisinopril 10mg QTY90 0R     Last labs- 01/31/2022 tsh, lipid, cmp, cbc, vitamin d  Future Appointments   Date Time Provider [facility]   8/8/2022  3:30 PM Josiah Ortiz 27 Na   8/15/2022 10:00 AM Josiah Ortiz 27 Na   8/22/2022 10:00 AM Josiah Ortiz 27 Na   8/29/2022 10:00 AM Josiah Ortiz 27 Na   9/12/2022  2:45 PM Em Peterson MD [facility] [place]   9/19/2022  9:00 AM Jim Medina DO [facility]

## 2022-08-08 ENCOUNTER — OFFICE VISIT (OUTPATIENT)
Dept: PHYSICAL THERAPY | Age: 77
End: 2022-08-08
Attending: HOSPITALIST
Payer: MEDICARE

## 2022-08-08 ENCOUNTER — TELEPHONE (OUTPATIENT)
Dept: NEUROLOGY | Facility: CLINIC | Age: 77
End: 2022-08-08

## 2022-08-08 DIAGNOSIS — M54.50 LOW BACK PAIN, UNSPECIFIED BACK PAIN LATERALITY, UNSPECIFIED CHRONICITY, UNSPECIFIED WHETHER SCIATICA PRESENT: Primary | ICD-10-CM

## 2022-08-08 DIAGNOSIS — M54.2 NECK PAIN: ICD-10-CM

## 2022-08-08 PROCEDURE — 97140 MANUAL THERAPY 1/> REGIONS: CPT

## 2022-08-08 PROCEDURE — 97110 THERAPEUTIC EXERCISES: CPT

## 2022-08-15 ENCOUNTER — APPOINTMENT (OUTPATIENT)
Dept: PHYSICAL THERAPY | Age: 77
End: 2022-08-15
Attending: HOSPITALIST
Payer: MEDICARE

## 2022-08-22 ENCOUNTER — OFFICE VISIT (OUTPATIENT)
Dept: PHYSICAL THERAPY | Age: 77
End: 2022-08-22
Attending: HOSPITALIST
Payer: MEDICARE

## 2022-08-22 PROCEDURE — 97161 PT EVAL LOW COMPLEX 20 MIN: CPT

## 2022-08-22 PROCEDURE — 97110 THERAPEUTIC EXERCISES: CPT

## 2022-08-29 ENCOUNTER — OFFICE VISIT (OUTPATIENT)
Dept: PHYSICAL THERAPY | Age: 77
End: 2022-08-29
Attending: HOSPITALIST
Payer: MEDICARE

## 2022-08-29 PROCEDURE — 97110 THERAPEUTIC EXERCISES: CPT

## 2022-09-09 RX ORDER — DESONIDE 0.5 MG/ML
LOTION TOPICAL
Qty: 118 ML | Refills: 0 | Status: SHIPPED | OUTPATIENT
Start: 2022-09-09

## 2022-09-09 NOTE — TELEPHONE ENCOUNTER
Last visit- 02/07/2022 cpe     Last refill- 04/14/2022 hydrocortisone 2.5 external cream QTY28g 0R,  08/02/2022 desonide 0.05% external lotion [id number] 0R    Last labs- 01/31/2022 tsh, lipid, cmp, cbc, vitamin d  Future Appointments   Date Time Provider Lluvia Kumaristi   9/12/2022  8:45 AM Chase Maxwell MD [facility]   9/19/2022  9:00 AM Arnaldo Granados DO [facility] [facility]

## 2022-09-19 ENCOUNTER — OFFICE VISIT (OUTPATIENT)
Dept: INTERNAL MEDICINE CLINIC | Facility: CLINIC | Age: 77
End: 2022-09-19
Payer: MEDICARE

## 2022-09-19 VITALS
WEIGHT: 112 LBS | HEART RATE: 73 BPM | SYSTOLIC BLOOD PRESSURE: 120 MMHG | OXYGEN SATURATION: 100 % | DIASTOLIC BLOOD PRESSURE: 60 MMHG | HEIGHT: 61 IN | BODY MASS INDEX: 21.14 KG/M2

## 2022-09-19 DIAGNOSIS — I10 ESSENTIAL HYPERTENSION: Primary | ICD-10-CM

## 2022-09-19 DIAGNOSIS — R42 VERTIGO: ICD-10-CM

## 2022-09-19 PROCEDURE — 99213 OFFICE O/P EST LOW 20 MIN: CPT | Performed by: FAMILY MEDICINE

## 2022-09-19 RX ORDER — DESONIDE 0.5 MG/ML
1 LOTION TOPICAL 2 TIMES DAILY
Qty: 118 ML | Refills: 0 | Status: SHIPPED | OUTPATIENT
Start: 2022-09-19

## 2022-09-19 RX ORDER — LISINOPRIL 10 MG/1
10 TABLET ORAL DAILY
Qty: 90 TABLET | Refills: 1 | Status: SHIPPED | OUTPATIENT
Start: 2022-09-19

## 2022-09-19 RX ORDER — MECLIZINE HYDROCHLORIDE 25 MG/1
25 TABLET ORAL 3 TIMES DAILY PRN
Qty: 30 TABLET | Refills: 0 | Status: SHIPPED | OUTPATIENT
Start: 2022-09-19

## 2022-10-12 ENCOUNTER — TELEPHONE (OUTPATIENT)
Dept: INTERNAL MEDICINE CLINIC | Facility: CLINIC | Age: 77
End: 2022-10-12

## 2022-10-12 NOTE — TELEPHONE ENCOUNTER
Please call patient. She sent me a recipe card with her meds listed asking if it was ok for her to be taking all of the meds listed. She has Cephalexin listed taking TID. Is this new? Temporary? What for? And the excedrin and advil she has listed. These she is just taking PRN or daily?

## 2022-10-13 NOTE — TELEPHONE ENCOUNTER
Patient contacted. she reports derm started her on cephalexin r/t acne. She takes one month duration at a time when she has bad acne- this is not daily. Script written by Dr. Wil Patino excedrin PRN for headaches, and advil PRN for back pain. These are not used daily.

## 2022-11-28 ENCOUNTER — TELEPHONE (OUTPATIENT)
Dept: SURGERY | Facility: CLINIC | Age: 77
End: 2022-11-28

## 2022-11-28 NOTE — TELEPHONE ENCOUNTER
Pt previously saw Dr Migel Sage. She needs a new referral for PT. She sees Kerry Burkitt at an [facility] located at [facility] and [address] [place] [address]. Please advise.

## 2022-12-13 ENCOUNTER — TELEPHONE (OUTPATIENT)
Dept: INTERNAL MEDICINE CLINIC | Facility: CLINIC | Age: 77
End: 2022-12-13

## 2022-12-13 RX ORDER — MECLIZINE HYDROCHLORIDE 25 MG/1
TABLET ORAL
Qty: 30 TABLET | Refills: 0 | Status: SHIPPED | OUTPATIENT
Start: 2022-12-13

## 2022-12-13 RX ORDER — RALOXIFENE HYDROCHLORIDE 60 MG/1
60 TABLET, FILM COATED ORAL DAILY
Qty: 90 TABLET | Refills: 0 | Status: SHIPPED | OUTPATIENT
Start: 2022-12-13

## 2022-12-13 RX ORDER — OMEPRAZOLE 20 MG/1
CAPSULE, DELAYED RELEASE ORAL
Qty: 90 CAPSULE | Refills: 0 | Status: SHIPPED | OUTPATIENT
Start: 2022-12-13

## 2022-12-13 RX ORDER — DESONIDE 0.5 MG/ML
LOTION TOPICAL
Qty: 118 ML | Refills: 0 | Status: SHIPPED | OUTPATIENT
Start: 2022-12-13

## 2022-12-13 NOTE — TELEPHONE ENCOUNTER
Last visit- 09/19/2022 hypertension     Last refill- 09/19/2022 hydrocortisone 2.5% QTY20 0R,  09/19/2022 desonide 0.05% [id number] 0R,  09/19/2022 meclizine 25mg QTY30 0R,  12/06/2021 omeprazole 20mg QTY90 3R,  12/06/2021 raloxifen 60mg QTY90 3R    Last labs- 01/31/2022 tsh, lipid, cmp, cbc, vitamin d    No future appointments.

## 2022-12-13 NOTE — TELEPHONE ENCOUNTER
Please call patient. Last year we asked her to see Endo to f/u on an elevated PTH and osteoporosis. She saw Dr. Nicole Barton in Jan, 2022. Did she f/u with Dr. Nicole Barton? If not, she needs to schedule appt with her for f/u.

## 2022-12-14 NOTE — TELEPHONE ENCOUNTER
[facility] Dr. Shal[facility]. Per Dr. Shalonda Garcia note from 1/2022, pt to return in 1 year. Called to confirm this with pt and make sure she has f/u appt scheduled.

## 2023-01-08 ENCOUNTER — HOSPITAL ENCOUNTER (EMERGENCY)
Facility: HOSPITAL | Age: 78
Discharge: HOME | End: 2023-01-08
Attending: EMERGENCY MEDICINE
Payer: MEDICARE

## 2023-01-08 ENCOUNTER — APPOINTMENT (OUTPATIENT)
Dept: CT IMAGING | Facility: HOSPITAL | Age: 78
End: 2023-01-08
Attending: EMERGENCY MEDICINE
Payer: MEDICARE

## 2023-01-08 VITALS
SYSTOLIC BLOOD PRESSURE: 125 MMHG | TEMPERATURE: 98 F | HEART RATE: 70 BPM | DIASTOLIC BLOOD PRESSURE: 72 MMHG | OXYGEN SATURATION: 98 % | RESPIRATION RATE: 18 BRPM | WEIGHT: 112.44 LBS | BODY MASS INDEX: 21 KG/M2

## 2023-01-08 DIAGNOSIS — R51.9 ACUTE NONINTRACTABLE HEADACHE, UNSPECIFIED HEADACHE TYPE: ICD-10-CM

## 2023-01-08 DIAGNOSIS — R11.0 NAUSEA: Primary | ICD-10-CM

## 2023-01-08 LAB
ALBUMIN SERPL-MCNC: 3.4 G/DL (ref 3.4–5)
ALBUMIN/GLOB SERPL: 0.8 {RATIO} (ref 1–2)
ALP LIVER SERPL-CCNC: 68 U/L
ALT SERPL-CCNC: 26 U/L
ANION GAP SERPL CALC-SCNC: 3 MMOL/L (ref 0–18)
AST SERPL-CCNC: 27 U/L (ref 15–37)
ATRIAL RATE: 99 BPM
BASOPHILS # BLD AUTO: 0.02 X10(3) UL (ref 0–0.2)
BASOPHILS NFR BLD AUTO: 0.2 %
BILIRUB SERPL-MCNC: 0.4 MG/DL (ref 0.1–2)
BILIRUB UR QL STRIP.AUTO: NEGATIVE
BUN BLD-MCNC: 14 MG/DL (ref 7–18)
CALCIUM BLD-MCNC: 9.6 MG/DL (ref 8.5–10.1)
CHLORIDE SERPL-SCNC: 103 MMOL/L (ref 98–112)
CO2 SERPL-SCNC: 27 MMOL/L (ref 21–32)
COLOR UR AUTO: YELLOW
CREAT BLD-MCNC: 0.93 MG/DL
EOSINOPHIL # BLD AUTO: 0 X10(3) UL (ref 0–0.7)
EOSINOPHIL NFR BLD AUTO: 0 %
ERYTHROCYTE [DISTWIDTH] IN BLOOD BY AUTOMATED COUNT: 12.6 %
FLUAV + FLUBV RNA SPEC NAA+PROBE: NEGATIVE
FLUAV + FLUBV RNA SPEC NAA+PROBE: NEGATIVE
GFR SERPLBLD BASED ON 1.73 SQ M-ARVRAT: 62 ML/MIN/1.73M2 (ref 60–?)
GLOBULIN PLAS-MCNC: 4.4 G/DL (ref 2.8–4.4)
GLUCOSE BLD-MCNC: 183 MG/DL (ref 70–99)
GLUCOSE UR STRIP.AUTO-MCNC: NEGATIVE MG/DL
HCT VFR BLD AUTO: 41.8 %
HGB BLD-MCNC: 14.2 G/DL
HYALINE CASTS #/AREA URNS AUTO: PRESENT /LPF
IMM GRANULOCYTES # BLD AUTO: 0.05 X10(3) UL (ref 0–1)
IMM GRANULOCYTES NFR BLD: 0.4 %
KETONES UR STRIP.AUTO-MCNC: NEGATIVE MG/DL
LYMPHOCYTES # BLD AUTO: 0.63 X10(3) UL (ref 1–4)
LYMPHOCYTES NFR BLD AUTO: 5.5 %
MCH RBC QN AUTO: 33.1 PG (ref 26–34)
MCHC RBC AUTO-ENTMCNC: 34 G/DL (ref 31–37)
MCV RBC AUTO: 97.4 FL
MONOCYTES # BLD AUTO: 0.68 X10(3) UL (ref 0.1–1)
MONOCYTES NFR BLD AUTO: 5.9 %
NEUTROPHILS # BLD AUTO: 10.12 X10 (3) UL (ref 1.5–7.7)
NEUTROPHILS # BLD AUTO: 10.12 X10(3) UL (ref 1.5–7.7)
NEUTROPHILS NFR BLD AUTO: 88 %
NITRITE UR QL STRIP.AUTO: NEGATIVE
OSMOLALITY SERPL CALC.SUM OF ELEC: 281 MOSM/KG (ref 275–295)
P AXIS: -3 DEGREES
P-R INTERVAL: 100 MS
PH UR STRIP.AUTO: 5 [PH] (ref 5–8)
PLATELET # BLD AUTO: 217 10(3)UL (ref 150–450)
POTASSIUM SERPL-SCNC: 3.6 MMOL/L (ref 3.5–5.1)
PROT SERPL-MCNC: 7.8 G/DL (ref 6.4–8.2)
PROT UR STRIP.AUTO-MCNC: 30 MG/DL
Q-T INTERVAL: 336 MS
QRS DURATION: 90 MS
QTC CALCULATION (BEZET): 431 MS
R AXIS: 55 DEGREES
RBC # BLD AUTO: 4.29 X10(6)UL
RBC UR QL AUTO: NEGATIVE
RSV RNA SPEC NAA+PROBE: NEGATIVE
SARS-COV-2 RNA RESP QL NAA+PROBE: NOT DETECTED
SODIUM SERPL-SCNC: 133 MMOL/L (ref 136–145)
SP GR UR STRIP.AUTO: 1.02 (ref 1–1.03)
T AXIS: 28 DEGREES
UROBILINOGEN UR STRIP.AUTO-MCNC: <2 MG/DL
VENTRICULAR RATE: 99 BPM
WBC # BLD AUTO: 11.5 X10(3) UL (ref 4–11)
WBC #/AREA URNS AUTO: >50 /HPF

## 2023-01-08 PROCEDURE — 93005 ELECTROCARDIOGRAM TRACING: CPT

## 2023-01-08 PROCEDURE — 85025 COMPLETE CBC W/AUTO DIFF WBC: CPT | Performed by: EMERGENCY MEDICINE

## 2023-01-08 PROCEDURE — 87086 URINE CULTURE/COLONY COUNT: CPT | Performed by: EMERGENCY MEDICINE

## 2023-01-08 PROCEDURE — 96375 TX/PRO/DX INJ NEW DRUG ADDON: CPT

## 2023-01-08 PROCEDURE — 80053 COMPREHEN METABOLIC PANEL: CPT | Performed by: EMERGENCY MEDICINE

## 2023-01-08 PROCEDURE — 99285 EMERGENCY DEPT VISIT HI MDM: CPT

## 2023-01-08 PROCEDURE — 70450 CT HEAD/BRAIN W/O DYE: CPT | Performed by: EMERGENCY MEDICINE

## 2023-01-08 PROCEDURE — 93010 ELECTROCARDIOGRAM REPORT: CPT

## 2023-01-08 PROCEDURE — 96361 HYDRATE IV INFUSION ADD-ON: CPT

## 2023-01-08 PROCEDURE — 96374 THER/PROPH/DIAG INJ IV PUSH: CPT

## 2023-01-08 PROCEDURE — 0241U SARS-COV-2/FLU A AND B/RSV BY PCR (GENEXPERT): CPT | Performed by: EMERGENCY MEDICINE

## 2023-01-08 PROCEDURE — 81001 URINALYSIS AUTO W/SCOPE: CPT | Performed by: EMERGENCY MEDICINE

## 2023-01-08 RX ORDER — ONDANSETRON 4 MG/1
4 TABLET, ORALLY DISINTEGRATING ORAL EVERY 4 HOURS PRN
Qty: 10 TABLET | Refills: 0 | Status: SHIPPED | OUTPATIENT
Start: 2023-01-08 | End: 2023-01-15

## 2023-01-08 RX ORDER — ONDANSETRON 2 MG/ML
4 INJECTION INTRAMUSCULAR; INTRAVENOUS ONCE
Status: COMPLETED | OUTPATIENT
Start: 2023-01-08 | End: 2023-01-08

## 2023-01-08 RX ORDER — KETOROLAC TROMETHAMINE 30 MG/ML
30 INJECTION, SOLUTION INTRAMUSCULAR; INTRAVENOUS ONCE
Status: COMPLETED | OUTPATIENT
Start: 2023-01-08 | End: 2023-01-08

## 2023-01-08 NOTE — ED INITIAL ASSESSMENT (HPI)
Per EMS, patient from [address] [place] [address] Ln. C/o of nausea for 4 days, back of head pain. Pt unsure if she took home medications last 4 days because of nausea. Denies vomiting/diarrhea. No recent illness.

## 2023-01-17 ENCOUNTER — TELEPHONE (OUTPATIENT)
Dept: INTERNAL MEDICINE CLINIC | Facility: CLINIC | Age: 78
End: 2023-01-17

## 2023-01-17 ENCOUNTER — HOSPITAL ENCOUNTER (EMERGENCY)
Facility: HOSPITAL | Age: 78
Discharge: HOME | End: 2023-01-17
Attending: EMERGENCY MEDICINE
Payer: MEDICARE

## 2023-01-17 ENCOUNTER — APPOINTMENT (OUTPATIENT)
Dept: GENERAL RADIOLOGY | Facility: HOSPITAL | Age: 78
End: 2023-01-17
Attending: EMERGENCY MEDICINE
Payer: MEDICARE

## 2023-01-17 ENCOUNTER — OFFICE VISIT (OUTPATIENT)
Dept: FAMILY MEDICINE CLINIC | Facility: CLINIC | Age: 78
End: 2023-01-17
Payer: MEDICARE

## 2023-01-17 VITALS
HEART RATE: 84 BPM | RESPIRATION RATE: 18 BRPM | DIASTOLIC BLOOD PRESSURE: 88 MMHG | BODY MASS INDEX: 19.32 KG/M2 | SYSTOLIC BLOOD PRESSURE: 144 MMHG | OXYGEN SATURATION: 100 % | HEIGHT: 62 IN | WEIGHT: 105 LBS | TEMPERATURE: 98 F

## 2023-01-17 VITALS
BODY MASS INDEX: 19.08 KG/M2 | WEIGHT: 105 LBS | SYSTOLIC BLOOD PRESSURE: 130 MMHG | RESPIRATION RATE: 17 BRPM | TEMPERATURE: 98 F | OXYGEN SATURATION: 100 % | HEART RATE: 79 BPM | HEIGHT: 62.21 IN | DIASTOLIC BLOOD PRESSURE: 78 MMHG

## 2023-01-17 DIAGNOSIS — E55.9 VITAMIN D DEFICIENCY: Primary | ICD-10-CM

## 2023-01-17 DIAGNOSIS — Z13.220 SCREENING, LIPID: ICD-10-CM

## 2023-01-17 DIAGNOSIS — Z13.0 SCREENING FOR DEFICIENCY ANEMIA: ICD-10-CM

## 2023-01-17 DIAGNOSIS — Z02.9 ADMINISTRATIVE ENCOUNTER: Primary | ICD-10-CM

## 2023-01-17 DIAGNOSIS — T18.108A FOREIGN BODY IN ESOPHAGUS, INITIAL ENCOUNTER: Primary | ICD-10-CM

## 2023-01-17 DIAGNOSIS — E03.8 SUBCLINICAL HYPOTHYROIDISM: ICD-10-CM

## 2023-01-17 DIAGNOSIS — I10 ESSENTIAL HYPERTENSION: ICD-10-CM

## 2023-01-17 PROCEDURE — 99284 EMERGENCY DEPT VISIT MOD MDM: CPT

## 2023-01-17 PROCEDURE — 70360 X-RAY EXAM OF NECK: CPT | Performed by: EMERGENCY MEDICINE

## 2023-01-17 PROCEDURE — 96375 TX/PRO/DX INJ NEW DRUG ADDON: CPT

## 2023-01-17 PROCEDURE — 96374 THER/PROPH/DIAG INJ IV PUSH: CPT

## 2023-01-17 RX ORDER — ONDANSETRON 2 MG/ML
4 INJECTION INTRAMUSCULAR; INTRAVENOUS ONCE
Status: COMPLETED | OUTPATIENT
Start: 2023-01-17 | End: 2023-01-17

## 2023-01-17 RX ORDER — LORAZEPAM 2 MG/ML
0.5 INJECTION INTRAMUSCULAR ONCE
Status: COMPLETED | OUTPATIENT
Start: 2023-01-17 | End: 2023-01-17

## 2023-01-17 NOTE — ED INITIAL ASSESSMENT (HPI)
Pt states that she ate an apple this morning at 0700 and felt that she was chocking so spit the apple out but still feels a piece of apple stuck in her throat. Pt states that she is unable to drink water without coughing.

## 2023-01-17 NOTE — TELEPHONE ENCOUNTER
Future Appointments   Date Time Provider Lluvia Irene   2/13/2023 10:30 AM Kg Jones DO [facility]     Informed must fast no call back required.  Orders to THE [facility] [place] [facility]

## 2023-01-17 NOTE — DISCHARGE INSTRUCTIONS
Please call gastroenterology and make an appointment to follow-up.   I do think after these multiple episodes you may need a scope so that they can make sure that there is no abnormality of the esophagus that needs to be addressed

## 2023-02-06 ENCOUNTER — LAB ENCOUNTER (OUTPATIENT)
Dept: LAB | Age: 78
End: 2023-02-06
Attending: FAMILY MEDICINE
Payer: MEDICARE

## 2023-02-06 DIAGNOSIS — Z13.0 SCREENING FOR DEFICIENCY ANEMIA: ICD-10-CM

## 2023-02-06 DIAGNOSIS — I10 ESSENTIAL HYPERTENSION: ICD-10-CM

## 2023-02-06 DIAGNOSIS — E03.8 SUBCLINICAL HYPOTHYROIDISM: ICD-10-CM

## 2023-02-06 DIAGNOSIS — E55.9 VITAMIN D DEFICIENCY: ICD-10-CM

## 2023-02-06 LAB
ALBUMIN SERPL-MCNC: 3.8 G/DL (ref 3.4–5)
ALBUMIN/GLOB SERPL: 1.1 {RATIO} (ref 1–2)
ALP LIVER SERPL-CCNC: 60 U/L
ALT SERPL-CCNC: 27 U/L
ANION GAP SERPL CALC-SCNC: 3 MMOL/L (ref 0–18)
AST SERPL-CCNC: 31 U/L (ref 15–37)
BASOPHILS # BLD AUTO: 0.07 X10(3) UL (ref 0–0.2)
BASOPHILS NFR BLD AUTO: 1.4 %
BILIRUB SERPL-MCNC: 0.5 MG/DL (ref 0.1–2)
BUN BLD-MCNC: 12 MG/DL (ref 7–18)
CALCIUM BLD-MCNC: 10.1 MG/DL (ref 8.5–10.1)
CHLORIDE SERPL-SCNC: 109 MMOL/L (ref 98–112)
CHOLEST SERPL-MCNC: 173 MG/DL (ref ?–200)
CO2 SERPL-SCNC: 27 MMOL/L (ref 21–32)
CREAT BLD-MCNC: 0.81 MG/DL
EOSINOPHIL # BLD AUTO: 0.23 X10(3) UL (ref 0–0.7)
EOSINOPHIL NFR BLD AUTO: 4.5 %
ERYTHROCYTE [DISTWIDTH] IN BLOOD BY AUTOMATED COUNT: 13.2 %
FASTING PATIENT LIPID ANSWER: YES
FASTING STATUS PATIENT QL REPORTED: YES
GFR SERPLBLD BASED ON 1.73 SQ M-ARVRAT: 73 ML/MIN/1.73M2 (ref 60–?)
GLOBULIN PLAS-MCNC: 3.4 G/DL (ref 2.8–4.4)
GLUCOSE BLD-MCNC: 89 MG/DL (ref 70–99)
HCT VFR BLD AUTO: 38.3 %
HDLC SERPL-MCNC: 78 MG/DL (ref 40–59)
HGB BLD-MCNC: 12.6 G/DL
IMM GRANULOCYTES # BLD AUTO: 0.01 X10(3) UL (ref 0–1)
IMM GRANULOCYTES NFR BLD: 0.2 %
LDLC SERPL CALC-MCNC: 82 MG/DL (ref ?–100)
LYMPHOCYTES # BLD AUTO: 1.28 X10(3) UL (ref 1–4)
LYMPHOCYTES NFR BLD AUTO: 25 %
MCH RBC QN AUTO: 32.8 PG (ref 26–34)
MCHC RBC AUTO-ENTMCNC: 32.9 G/DL (ref 31–37)
MCV RBC AUTO: 99.7 FL
MONOCYTES # BLD AUTO: 0.53 X10(3) UL (ref 0.1–1)
MONOCYTES NFR BLD AUTO: 10.3 %
NEUTROPHILS # BLD AUTO: 3.01 X10 (3) UL (ref 1.5–7.7)
NEUTROPHILS # BLD AUTO: 3.01 X10(3) UL (ref 1.5–7.7)
NEUTROPHILS NFR BLD AUTO: 58.6 %
NONHDLC SERPL-MCNC: 95 MG/DL (ref ?–130)
OSMOLALITY SERPL CALC.SUM OF ELEC: 287 MOSM/KG (ref 275–295)
PLATELET # BLD AUTO: 268 10(3)UL (ref 150–450)
POTASSIUM SERPL-SCNC: 4.2 MMOL/L (ref 3.5–5.1)
PROT SERPL-MCNC: 7.2 G/DL (ref 6.4–8.2)
RBC # BLD AUTO: 3.84 X10(6)UL
SODIUM SERPL-SCNC: 139 MMOL/L (ref 136–145)
T4 FREE SERPL-MCNC: 1 NG/DL (ref 0.8–1.7)
TRIGL SERPL-MCNC: 67 MG/DL (ref 30–149)
TSI SER-ACNC: 3.82 MIU/ML (ref 0.36–3.74)
VIT D+METAB SERPL-MCNC: 59.8 NG/ML (ref 30–100)
VLDLC SERPL CALC-MCNC: 11 MG/DL (ref 0–30)
WBC # BLD AUTO: 5.1 X10(3) UL (ref 4–11)

## 2023-02-06 PROCEDURE — 82306 VITAMIN D 25 HYDROXY: CPT

## 2023-02-06 PROCEDURE — 36415 COLL VENOUS BLD VENIPUNCTURE: CPT

## 2023-02-06 PROCEDURE — 80053 COMPREHEN METABOLIC PANEL: CPT

## 2023-02-06 PROCEDURE — 84439 ASSAY OF FREE THYROXINE: CPT

## 2023-02-06 PROCEDURE — 85025 COMPLETE CBC W/AUTO DIFF WBC: CPT

## 2023-02-06 PROCEDURE — 80061 LIPID PANEL: CPT

## 2023-02-06 PROCEDURE — 84443 ASSAY THYROID STIM HORMONE: CPT

## 2023-02-13 ENCOUNTER — TELEPHONE (OUTPATIENT)
Dept: INTERNAL MEDICINE CLINIC | Facility: CLINIC | Age: 78
End: 2023-02-13

## 2023-02-13 ENCOUNTER — OFFICE VISIT (OUTPATIENT)
Dept: INTERNAL MEDICINE CLINIC | Facility: CLINIC | Age: 78
End: 2023-02-13
Payer: COMMERCIAL

## 2023-02-13 VITALS
HEART RATE: 74 BPM | OXYGEN SATURATION: 100 % | BODY MASS INDEX: 20.47 KG/M2 | DIASTOLIC BLOOD PRESSURE: 62 MMHG | HEIGHT: 62.21 IN | WEIGHT: 112.63 LBS | SYSTOLIC BLOOD PRESSURE: 118 MMHG

## 2023-02-13 DIAGNOSIS — M19.042 OSTEOARTHRITIS OF BOTH HANDS, UNSPECIFIED OSTEOARTHRITIS TYPE: ICD-10-CM

## 2023-02-13 DIAGNOSIS — E46 PROTEIN-CALORIE MALNUTRITION, UNSPECIFIED SEVERITY (HCC): ICD-10-CM

## 2023-02-13 DIAGNOSIS — G89.29 CHRONIC LOW BACK PAIN, UNSPECIFIED BACK PAIN LATERALITY, UNSPECIFIED WHETHER SCIATICA PRESENT: ICD-10-CM

## 2023-02-13 DIAGNOSIS — E55.9 VITAMIN D DEFICIENCY: ICD-10-CM

## 2023-02-13 DIAGNOSIS — M81.6 LOCALIZED OSTEOPOROSIS WITHOUT CURRENT PATHOLOGICAL FRACTURE: ICD-10-CM

## 2023-02-13 DIAGNOSIS — M19.041 OSTEOARTHRITIS OF BOTH HANDS, UNSPECIFIED OSTEOARTHRITIS TYPE: ICD-10-CM

## 2023-02-13 DIAGNOSIS — Z00.00 ENCOUNTER FOR ANNUAL HEALTH EXAMINATION: Primary | ICD-10-CM

## 2023-02-13 DIAGNOSIS — I10 ESSENTIAL HYPERTENSION: ICD-10-CM

## 2023-02-13 DIAGNOSIS — K21.9 GASTROESOPHAGEAL REFLUX DISEASE WITHOUT ESOPHAGITIS: ICD-10-CM

## 2023-02-13 DIAGNOSIS — Z12.11 SCREENING FOR MALIGNANT NEOPLASM OF COLON: ICD-10-CM

## 2023-02-13 DIAGNOSIS — M05.79 RHEUMATOID ARTHRITIS INVOLVING MULTIPLE SITES WITH POSITIVE RHEUMATOID FACTOR (HCC): ICD-10-CM

## 2023-02-13 DIAGNOSIS — M54.50 CHRONIC LOW BACK PAIN, UNSPECIFIED BACK PAIN LATERALITY, UNSPECIFIED WHETHER SCIATICA PRESENT: ICD-10-CM

## 2023-02-13 NOTE — TELEPHONE ENCOUNTER
Faxed completed cologuard order form along copy of insurance and facesheet to [employer] at fax # [phone]. Confirmation received. Placed form to scan.

## 2023-02-20 ENCOUNTER — OFFICE VISIT (OUTPATIENT)
Dept: INTERNAL MEDICINE CLINIC | Facility: CLINIC | Age: 78
End: 2023-02-20
Payer: MEDICARE

## 2023-02-20 ENCOUNTER — TELEPHONE (OUTPATIENT)
Dept: INTERNAL MEDICINE CLINIC | Facility: CLINIC | Age: 78
End: 2023-02-20

## 2023-02-20 VITALS
HEART RATE: 62 BPM | BODY MASS INDEX: 20.35 KG/M2 | SYSTOLIC BLOOD PRESSURE: 120 MMHG | DIASTOLIC BLOOD PRESSURE: 62 MMHG | WEIGHT: 112 LBS | HEIGHT: 62.21 IN | OXYGEN SATURATION: 99 %

## 2023-02-20 DIAGNOSIS — R21 RASH: Primary | ICD-10-CM

## 2023-02-20 PROCEDURE — 99213 OFFICE O/P EST LOW 20 MIN: CPT | Performed by: FAMILY MEDICINE

## 2023-02-20 RX ORDER — FLUCONAZOLE 150 MG/1
TABLET ORAL
Qty: 3 TABLET | Refills: 0 | Status: SHIPPED | OUTPATIENT
Start: 2023-02-20

## 2023-02-20 RX ORDER — MECLIZINE HYDROCHLORIDE 25 MG/1
TABLET ORAL
Qty: 30 TABLET | Refills: 0 | Status: SHIPPED | OUTPATIENT
Start: 2023-02-20

## 2023-02-20 RX ORDER — CLOTRIMAZOLE AND BETAMETHASONE DIPROPIONATE 10; .64 MG/G; MG/G
1 CREAM TOPICAL 2 TIMES DAILY PRN
Qty: 60 G | Refills: 3 | Status: SHIPPED | OUTPATIENT
Start: 2023-02-20

## 2023-02-20 NOTE — TELEPHONE ENCOUNTER
Last visit- 02/13/2023 cpe    Last refill- 12/13/2022 meclizine 25mg QTY30 0R    Last labs- 02/06/2023 t4 free, lipid, cbc, vitamin d, tsh, cmp     No future appointments.

## 2023-02-20 NOTE — TELEPHONE ENCOUNTER
[facility] has 3:00 appt open today. Called and spoke to pt. Pt said she is doing ok, but rash is now blue and purple. Offered 3:00 appt to pt. Pt agreeable. Scheduled with [facility] today.     FYI to [facility]

## 2023-02-20 NOTE — TELEPHONE ENCOUNTER
Pt stated she saw AMS last week and for rash in groin area. Pt stated AMS told her to chceck back in with us it if turned colors. Pt stated it did and it hurts - 5/10  Pt not avail today, wants an appt tomorrow. Ok for triage spot?

## 2023-03-06 RX ORDER — MECLIZINE HYDROCHLORIDE 25 MG/1
TABLET ORAL
Qty: 30 TABLET | Refills: 0 | Status: SHIPPED | OUTPATIENT
Start: 2023-03-06

## 2023-03-06 NOTE — TELEPHONE ENCOUNTER
Last visit- 02/13/2023 cpe     Last refill- 02/20/2023 meclizine 25mg QTY30 0R    Last labs- 02/06/2023 t4 free, lipid, cbc, vitamin d, tsh,cmp   Future Appointments   Date Time Provider Lluvia Maria Victoria   4/17/2023 10:00 AM Josiah Steward 27 Na   4/24/2023 10:00 AM Josiah Steward 27 Na   5/1/2023 10:00 AM Josiah Steward 27 Na   5/8/2023 10:00 AM Josiah Steward 27 Na   5/15/2023 10:00 AM Josiah Steward 27 Na   5/22/2023 10:00 AM oJsiah Steward 27 Na   5/29/2023 10:00 AM Josiah Steward 27 Na   6/5/2023 10:00 AM Josiah Steward 27 Na   6/12/2023 10:00 AM Josiah Steward 27 Na   6/19/2023 10:00 AM Josiah Steward 27 Na

## 2023-03-08 RX ORDER — DESONIDE 0.5 MG/ML
LOTION TOPICAL
Qty: 118 ML | Refills: 0 | Status: SHIPPED | OUTPATIENT
Start: 2023-03-08

## 2023-03-08 RX ORDER — OMEPRAZOLE 20 MG/1
CAPSULE, DELAYED RELEASE ORAL
Qty: 90 CAPSULE | Refills: 0 | Status: SHIPPED | OUTPATIENT
Start: 2023-03-08

## 2023-03-08 NOTE — TELEPHONE ENCOUNTER
Last visit- 02/20/2023 rash     Last refill- 12/13/2022 desonide 0.05% [id number] 0R,  12/13/2022 omeprazole 20mg QTY90 0R    Last labs- 02/06/2023 t4 free, lipid, cbc, vitamin d, tsh, cmp   Future Appointments   Date Time Provider Lluvia Irene   4/17/2023 10:00 AM Jamar Andres, [address]   4/24/2023 10:00 AM Jamar Brhayden, [address] Na   5/1/2023 10:00 AM Jamar Brick, [address] Na   5/8/2023 10:00 AM Jamar Brick, [address] Na   5/15/2023 10:00 AM Jamar Brick, [address] Na   5/22/2023 10:00 AM Jamar Brick, [address] Na   5/29/2023 10:00 AM Jamar Brick, [address] Na   6/5/2023 10:00 AM Jamar Brick, [address] Na   6/12/2023 10:00 AM Jamar Brick, [address] Na   6/19/2023 10:00 AM Jamar Brick, [address] Na

## 2023-03-09 RX ORDER — CLOTRIMAZOLE AND BETAMETHASONE DIPROPIONATE 10; .64 MG/G; MG/G
1 CREAM TOPICAL 2 TIMES DAILY PRN
Qty: 60 G | Refills: 3 | Status: SHIPPED | OUTPATIENT
Start: 2023-03-09

## 2023-03-09 NOTE — TELEPHONE ENCOUNTER
Last visit- 02/20/2023 rash     Last refill- 02/20/2023 clotrimazole-betamethasone 1-0.05% QTY60g 3R    Last labs- 02/06/2023 t4 free, lipid, cbc, cmp, tsh, vitamin d  Future Appointments   Date Time Provider Lluvia Irene   4/17/2023 10:00 AM Mal Caul, [address] Na   4/24/2023 10:00 AM Mal Caul, [address] Na   5/1/2023 10:00 AM Mal Caul, [address] Na   5/8/2023 10:00 AM Mal Caul, [address] Na   5/15/2023 10:00 AM Mal Caul, [address] Na   5/22/2023 10:00 AM Mal Caul, [address] Na   5/29/2023 10:00 AM Mal Caul, [address] Na   6/5/2023 10:00 AM Mal Caul, [address] Na   6/12/2023 10:00 AM Mal Caul, [address] Na   6/19/2023 10:00 AM Mal Caul, [address] Na

## 2023-03-24 ENCOUNTER — TELEPHONE (OUTPATIENT)
Dept: INTERNAL MEDICINE CLINIC | Facility: CLINIC | Age: 78
End: 2023-03-24

## 2023-03-24 NOTE — TELEPHONE ENCOUNTER
Pt rash has improved a little but still tingly at times. She has finished medication and is asking for more or if AMS thinks she needs a visit she could come anytime on Mon. She would like a response via [employer] and is ok being scheduled at any time on Mon.

## 2023-03-24 NOTE — TELEPHONE ENCOUNTER
Pt seen by AMS 2/20 for vaginal rash. Per pt, rash improved but still \"tingly\" at times. Asking if she should be seen for f/u? AMS, advise in office f/u?

## 2023-03-27 ENCOUNTER — OFFICE VISIT (OUTPATIENT)
Dept: INTERNAL MEDICINE CLINIC | Facility: CLINIC | Age: 78
End: 2023-03-27
Payer: MEDICARE

## 2023-03-27 ENCOUNTER — APPOINTMENT (OUTPATIENT)
Dept: PHYSICAL THERAPY | Age: 78
End: 2023-03-27
Attending: FAMILY MEDICINE
Payer: MEDICARE

## 2023-03-27 VITALS
DIASTOLIC BLOOD PRESSURE: 64 MMHG | WEIGHT: 109.19 LBS | OXYGEN SATURATION: 100 % | HEIGHT: 62.21 IN | HEART RATE: 66 BPM | BODY MASS INDEX: 19.84 KG/M2 | SYSTOLIC BLOOD PRESSURE: 118 MMHG

## 2023-03-27 DIAGNOSIS — R21 RASH: Primary | ICD-10-CM

## 2023-03-27 PROCEDURE — 99213 OFFICE O/P EST LOW 20 MIN: CPT | Performed by: FAMILY MEDICINE

## 2023-03-27 NOTE — PATIENT INSTRUCTIONS
Stop using topicals on your rash. Start using Tucks wipes whenever you go to the bathroom, wipe with toilet paper and then wipe with the Tucks wipes. Use sensitive skin body lotion on the spot on your left buttock.

## 2023-04-03 ENCOUNTER — APPOINTMENT (OUTPATIENT)
Dept: PHYSICAL THERAPY | Age: 78
End: 2023-04-03
Attending: FAMILY MEDICINE
Payer: MEDICARE

## 2023-04-05 ENCOUNTER — TELEPHONE (OUTPATIENT)
Dept: INTERNAL MEDICINE CLINIC | Facility: CLINIC | Age: 78
End: 2023-04-05

## 2023-04-05 NOTE — TELEPHONE ENCOUNTER
Pt calling with update on rash. The itching and pain has gone away but it's still very \"colorful\"-black, blue and purple. Any messages can be sent to her via my chart, she checks it frequently.

## 2023-04-06 NOTE — TELEPHONE ENCOUNTER
I believe the symptoms she is describing are on her labia which is different than the rash she had in her groin and pubic area. She may need the area biopsied and would need to see Derm for this.

## 2023-04-11 ENCOUNTER — APPOINTMENT (OUTPATIENT)
Dept: PHYSICAL THERAPY | Age: 78
End: 2023-04-11
Attending: FAMILY MEDICINE
Payer: MEDICARE

## 2023-04-17 ENCOUNTER — APPOINTMENT (OUTPATIENT)
Dept: PHYSICAL THERAPY | Age: 78
End: 2023-04-17
Attending: FAMILY MEDICINE
Payer: MEDICARE

## 2023-04-18 RX ORDER — DESONIDE 0.5 MG/ML
LOTION TOPICAL
Qty: 118 ML | Refills: 0 | Status: SHIPPED | OUTPATIENT
Start: 2023-04-18

## 2023-04-18 RX ORDER — MECLIZINE HYDROCHLORIDE 25 MG/1
TABLET ORAL
Qty: 30 TABLET | Refills: 0 | Status: SHIPPED | OUTPATIENT
Start: 2023-04-18

## 2023-04-18 NOTE — TELEPHONE ENCOUNTER
Last visit- 03/27/2023 rash    Last refill- 03/08/2023 desonide 0.05% external lotion [id number] 0R,  03/06/2023 meclizine 25mg QTY30 0R    Last labs- 02/06/2023 t4 free, lipid, cbc, vitamin d, tsh, cmp  Future Appointments   Date Time Provider Lluvia Irene   4/24/2023 10:00 AM Josiah Haas 27 Na   5/1/2023 10:00 AM Josiah Haas 27 Na   5/8/2023 10:00 AM Josiah Haas 27 Na   5/15/2023 10:00 AM Josiah Haas 27 Na   5/22/2023 10:00 AM Josiah Haas 27 Na   5/24/2023  9:00 AM Alcides Sherman MD [facility]    5/29/2023 10:00 AM Josiah Haas 27 Na   6/5/2023 10:00 AM Josiah Haas 27 Na   6/12/2023 10:00 AM Josiah Haas 27 Na   6/19/2023 10:00 AM Josiah Haas 27 Na

## 2023-04-19 RX ORDER — RALOXIFENE HYDROCHLORIDE 60 MG/1
TABLET, FILM COATED ORAL
Qty: 90 TABLET | Refills: 0 | Status: SHIPPED | OUTPATIENT
Start: 2023-04-19

## 2023-04-19 NOTE — TELEPHONE ENCOUNTER
Last visit- 03/27/2023 rash    Last refill- 12/13/2022 raloxifen 60mg QTY90 0R    Last labs- 02/06/2023 t4 free, lipid, cbc, vitamin d, tsh. cmp  Future Appointments   Date Time Provider Lluvia Irene   4/24/2023 10:00 AM Josiah Souza 27 Na   5/1/2023 10:00 AM Josiah Souza 27 Na   5/8/2023 10:00 AM Josiah Souza 27 Na   5/15/2023 10:00 AM Josiah Souza 27 Na   5/22/2023 10:00 AM Josiah Souza 27 Na   5/24/2023  9:00 AM Leonard Moeller MD [facility] [address]   5/29/2023 10:00 AM Josiah Souza 27 Na   6/5/2023 10:00 AM Josiah Souza 27 Na   6/12/2023 10:00 AM Josiah Souza 27 Na   6/19/2023 10:00 AM Josiah Souza 27 Na

## 2023-04-24 ENCOUNTER — OFFICE VISIT (OUTPATIENT)
Dept: PHYSICAL THERAPY | Age: 78
End: 2023-04-24
Attending: FAMILY MEDICINE
Payer: MEDICARE

## 2023-04-24 DIAGNOSIS — M54.50 CHRONIC LOW BACK PAIN, UNSPECIFIED BACK PAIN LATERALITY, UNSPECIFIED WHETHER SCIATICA PRESENT: ICD-10-CM

## 2023-04-24 DIAGNOSIS — G89.29 CHRONIC LOW BACK PAIN, UNSPECIFIED BACK PAIN LATERALITY, UNSPECIFIED WHETHER SCIATICA PRESENT: ICD-10-CM

## 2023-04-24 PROCEDURE — 97161 PT EVAL LOW COMPLEX 20 MIN: CPT

## 2023-04-24 PROCEDURE — 97110 THERAPEUTIC EXERCISES: CPT

## 2023-05-01 ENCOUNTER — OFFICE VISIT (OUTPATIENT)
Dept: PHYSICAL THERAPY | Age: 78
End: 2023-05-01
Attending: FAMILY MEDICINE
Payer: MEDICARE

## 2023-05-01 PROCEDURE — 97110 THERAPEUTIC EXERCISES: CPT

## 2023-05-01 PROCEDURE — 97140 MANUAL THERAPY 1/> REGIONS: CPT

## 2023-05-05 DIAGNOSIS — I10 ESSENTIAL HYPERTENSION: ICD-10-CM

## 2023-05-05 RX ORDER — LISINOPRIL 10 MG/1
TABLET ORAL
Qty: 90 TABLET | Refills: 1 | Status: SHIPPED | OUTPATIENT
Start: 2023-05-05

## 2023-05-08 ENCOUNTER — OFFICE VISIT (OUTPATIENT)
Dept: PHYSICAL THERAPY | Age: 78
End: 2023-05-08
Attending: FAMILY MEDICINE
Payer: MEDICARE

## 2023-05-08 PROCEDURE — 97140 MANUAL THERAPY 1/> REGIONS: CPT

## 2023-05-08 PROCEDURE — 97110 THERAPEUTIC EXERCISES: CPT

## 2023-05-15 ENCOUNTER — OFFICE VISIT (OUTPATIENT)
Dept: PHYSICAL THERAPY | Age: 78
End: 2023-05-15
Attending: FAMILY MEDICINE
Payer: MEDICARE

## 2023-05-15 PROCEDURE — 97110 THERAPEUTIC EXERCISES: CPT

## 2023-05-15 PROCEDURE — 97140 MANUAL THERAPY 1/> REGIONS: CPT

## 2023-05-22 ENCOUNTER — OFFICE VISIT (OUTPATIENT)
Dept: PHYSICAL THERAPY | Age: 78
End: 2023-05-22
Attending: FAMILY MEDICINE
Payer: MEDICARE

## 2023-05-22 PROCEDURE — 97110 THERAPEUTIC EXERCISES: CPT

## 2023-05-23 RX ORDER — DESONIDE 0.5 MG/ML
LOTION TOPICAL
Qty: 118 ML | Refills: 0 | Status: SHIPPED | OUTPATIENT
Start: 2023-05-23

## 2023-05-29 ENCOUNTER — APPOINTMENT (OUTPATIENT)
Dept: PHYSICAL THERAPY | Age: 78
End: 2023-05-29
Attending: FAMILY MEDICINE
Payer: MEDICARE

## 2023-06-05 ENCOUNTER — OFFICE VISIT (OUTPATIENT)
Dept: PHYSICAL THERAPY | Age: 78
End: 2023-06-05
Attending: FAMILY MEDICINE
Payer: MEDICARE

## 2023-06-05 PROCEDURE — 97110 THERAPEUTIC EXERCISES: CPT

## 2023-06-12 ENCOUNTER — OFFICE VISIT (OUTPATIENT)
Dept: PHYSICAL THERAPY | Age: 78
End: 2023-06-12
Attending: FAMILY MEDICINE
Payer: MEDICARE

## 2023-06-12 PROCEDURE — 97110 THERAPEUTIC EXERCISES: CPT

## 2023-06-12 NOTE — TELEPHONE ENCOUNTER
Last OV 3/27/23 acute    Last PE 2/13/23   Last REFILL   OMEPRAZOLE 20 MG Oral Capsule Delayed Release 90 capsule 0 3/8/2023       Last LAB 2/6/23 cbc cmp tsh lipid. Future Appointments   Date Time Provider Lluvia Irene   7/10/2023 10:00 AM Johnson Montoya, PT [facility]         Per PROTOCOL  No protocol. Please Advise?

## 2023-06-13 RX ORDER — OMEPRAZOLE 20 MG/1
CAPSULE, DELAYED RELEASE ORAL
Qty: 90 CAPSULE | Refills: 0 | Status: SHIPPED | OUTPATIENT
Start: 2023-06-13

## 2023-06-19 ENCOUNTER — APPOINTMENT (OUTPATIENT)
Dept: PHYSICAL THERAPY | Age: 78
End: 2023-06-19
Attending: FAMILY MEDICINE
Payer: MEDICARE

## 2023-06-20 ENCOUNTER — TELEPHONE (OUTPATIENT)
Dept: INTERNAL MEDICINE CLINIC | Facility: CLINIC | Age: 78
End: 2023-06-20

## 2023-06-20 NOTE — TELEPHONE ENCOUNTER
Patients Eye Exam Progress notes. [facility]. Dr Reno Frye MD. Zandra Flower in [facility] for review.

## 2023-06-26 ENCOUNTER — TELEPHONE (OUTPATIENT)
Dept: INTERNAL MEDICINE CLINIC | Facility: CLINIC | Age: 78
End: 2023-06-26

## 2023-06-26 DIAGNOSIS — Z13.220 SCREENING FOR LIPID DISORDERS: ICD-10-CM

## 2023-06-26 DIAGNOSIS — I10 ESSENTIAL HYPERTENSION: ICD-10-CM

## 2023-06-26 DIAGNOSIS — Z00.00 ROUTINE GENERAL MEDICAL EXAMINATION AT A HEALTH CARE FACILITY: Primary | ICD-10-CM

## 2023-06-26 DIAGNOSIS — Z13.0 SCREENING FOR DISORDER OF BLOOD AND BLOOD-FORMING ORGANS: ICD-10-CM

## 2023-06-26 NOTE — TELEPHONE ENCOUNTER
Pt is scheduled for cpe Informed must fast and would like  a call back when orders are placed .  Orders to THE [facility]     Future Appointments   Date Time Provider Lluvia Irene   7/10/2023 10:00 AM Loras Fleischer, [address] Na   8/28/2023  9:30 AM Fernando Johansen DO [facility]

## 2023-07-10 ENCOUNTER — APPOINTMENT (OUTPATIENT)
Dept: PHYSICAL THERAPY | Age: 78
End: 2023-07-10
Attending: FAMILY MEDICINE
Payer: MEDICARE

## 2023-07-31 ENCOUNTER — OFFICE VISIT (OUTPATIENT)
Dept: INTERNAL MEDICINE CLINIC | Facility: CLINIC | Age: 78
End: 2023-07-31
Payer: MEDICARE

## 2023-07-31 ENCOUNTER — TELEPHONE (OUTPATIENT)
Dept: INTERNAL MEDICINE CLINIC | Facility: CLINIC | Age: 78
End: 2023-07-31

## 2023-07-31 VITALS
DIASTOLIC BLOOD PRESSURE: 82 MMHG | RESPIRATION RATE: 20 BRPM | WEIGHT: 112 LBS | SYSTOLIC BLOOD PRESSURE: 130 MMHG | BODY MASS INDEX: 20.35 KG/M2 | OXYGEN SATURATION: 97 % | HEART RATE: 92 BPM | HEIGHT: 62.21 IN

## 2023-07-31 DIAGNOSIS — N81.9 FEMALE GENITAL PROLAPSE, UNSPECIFIED TYPE: Primary | ICD-10-CM

## 2023-07-31 PROCEDURE — 99213 OFFICE O/P EST LOW 20 MIN: CPT | Performed by: FAMILY MEDICINE

## 2023-07-31 NOTE — TELEPHONE ENCOUNTER
Patient states she has bulges in the rectum area when she washes herself, comes and goes, hard to walk sometimes, occasionally black when she wipes on the toilet paper, no blood in the toilet, normal BM's, normal urination. Pt states she has had 7 children. Pt states she had a colonoscopy several years ago and everything was 'perfect', doesn't remember exactly when. Pt denies pain but can be uncomfortable. Pt is off today and tomorrow but works at [employer] after that for 5 days in a row and stands for her job. AMS, no availability today or tomorrow. Please advise.

## 2023-07-31 NOTE — TELEPHONE ENCOUNTER
If she's ok with waiting while here, she could come at 1pm and we can fit her in after I see the 1 and 1:30 patients (they are together).

## 2023-07-31 NOTE — TELEPHONE ENCOUNTER
Pt called stating for the past year she has felt like her organs are falling out of her rectum-she saw urologist last year for this and everything was fine but now is worse feeling-she states she has blood on paper when she wipes after bowel movement now as well-wants appt today or tomorrow.  Nothing available

## 2023-08-02 ENCOUNTER — OFFICE VISIT (OUTPATIENT)
Dept: UROLOGY | Facility: CLINIC | Age: 78
End: 2023-08-02
Attending: OBSTETRICS & GYNECOLOGY
Payer: MEDICARE

## 2023-08-02 VITALS — WEIGHT: 112 LBS | BODY MASS INDEX: 20.35 KG/M2 | RESPIRATION RATE: 20 BRPM | HEIGHT: 62.21 IN

## 2023-08-02 DIAGNOSIS — N81.6 RECTOCELE: ICD-10-CM

## 2023-08-02 DIAGNOSIS — N81.2 INCOMPLETE UTEROVAGINAL PROLAPSE: Primary | ICD-10-CM

## 2023-08-02 DIAGNOSIS — N81.11 CYSTOCELE, MIDLINE: ICD-10-CM

## 2023-08-02 DIAGNOSIS — N39.3 STRESS INCONTINENCE: ICD-10-CM

## 2023-08-02 DIAGNOSIS — N95.2 VAGINAL ATROPHY: ICD-10-CM

## 2023-08-02 PROCEDURE — 51798 US URINE CAPACITY MEASURE: CPT | Performed by: OBSTETRICS & GYNECOLOGY

## 2023-08-02 PROCEDURE — 99212 OFFICE O/P EST SF 10 MIN: CPT

## 2023-08-02 NOTE — PROGRESS NOTES
Subjective:   Patient ID: Miki Mullen is a [de-identified]year old female. HPI Here with c/o feeling like something is protruding from her vagina. Notices more when she tries to urinate and have a bowel movement. This has been months but getting worse. No pain. No bleeding. History/Other:   Review of Systems   Gastrointestinal:  Negative for abdominal pain and nausea. Genitourinary:  Negative for hematuria and pelvic pain. Current Outpatient Medications   Medication Sig Dispense Refill    OMEPRAZOLE 20 MG Oral Capsule Delayed Release TAKE ONE CAPSULE BY MOUTH EVERY MORNING 90 capsule 0    LISINOPRIL 10 MG Oral Tab TAKE ONE TABLET BY MOUTH ONCE DAILY 90 tablet 1    hydrocortisone 2.5 % External Ointment Apply 1 Application topically 2 (two) times daily as needed. 20 each 0    Multiple Vitamins-Minerals (CENTRUM) Oral Tab Take 1 tablet by mouth daily. meclizine 25 MG Oral Tab Take 1 tablet (25 mg total) by mouth 3 (three) times daily as needed. 30 tablet 0    raloxifene 60 MG Oral Tab Take 1 tablet (60 mg total) by mouth daily. 90 tablet 0     Allergies:  Bisphosphonates         OTHER (SEE COMMENTS)    Comment:Sig bone pain    Objective:   Physical Exam  Vitals reviewed. Constitutional:       Appearance: Normal appearance. She is well-developed. HENT:      Head: Normocephalic and atraumatic. Pulmonary:      Effort: Pulmonary effort is normal.   Genitourinary:     Uterus: With uterine prolapse. Neurological:      Mental Status: She is alert. Psychiatric:         Mood and Affect: Mood normal.         Behavior: Behavior normal.         Assessment & Plan:   Female genital prolapse, unspecified type  (primary encounter diagnosis)  Referral to Urogynecology. No orders of the defined types were placed in this encounter.       Meds This Visit:  Requested Prescriptions      No prescriptions requested or ordered in this encounter       Imaging & Referrals:  OP REFERRAL TO [address]

## 2023-08-02 NOTE — PROCEDURES
[facility] Pelvic Medicine  Bladder Scanner Note    Date:  2023    Patient Name:  Mila Romero  Patient :  1945   Patient MRN:  [id number]  Patient Age:  [de-identified]year old      Diagnosis:  Post Void Residual    Procedure:  [address] Bladder Scanner    Physician:  Dr. Irina Mane    RN:  Bigg Juarez RN     Patient voided 400cc in BR. Bladder scanned per procedure with a residual amount of 99cc. Dr. Louann Branch informed.

## 2023-08-07 ENCOUNTER — TELEPHONE (OUTPATIENT)
Dept: INTERNAL MEDICINE CLINIC | Facility: CLINIC | Age: 78
End: 2023-08-07

## 2023-08-07 DIAGNOSIS — N81.4 UTEROVAGINAL PROLAPSE: ICD-10-CM

## 2023-08-07 DIAGNOSIS — Z01.818 PREOPERATIVE EXAMINATION: Primary | ICD-10-CM

## 2023-08-07 NOTE — TELEPHONE ENCOUNTER
Received information from [place] [facility]  Patient having total hysterectomy  01/4/24    Placed paperwork in folder above AMS desk

## 2023-08-09 NOTE — TELEPHONE ENCOUNTER
Please call patient to schedule pre-op exam for sometime between 12/5/23 and 12/22/23. She needs labs and EKG prior. Orders are in at THE [facility]. Surgery is 1/4/24. 15 mins. She should do the EKG and labs as soon as possible within 30 days of surgery.

## 2023-08-10 NOTE — TELEPHONE ENCOUNTER
Future Appointments   Date Time Provider Lluvia Maria Victoria   8/28/2023  9:30 AM Jacque Cohen, DO [facility]   9/5/2023 10:30 AM MARY Ramos   9/6/2023 11:00 AM MD ARTUR Edgar   10/9/2023  9:30 AM Clemente Hull MD [facility]   12/11/2023 10:30 AM Jacque Cohen DO [facility]   2/14/2024  9:20 AM MD ARTUR Edgar None     Informed pt to get EKG and labs week prior to appt

## 2023-08-24 ENCOUNTER — OFFICE VISIT (OUTPATIENT)
Dept: FAMILY MEDICINE CLINIC | Facility: CLINIC | Age: 78
End: 2023-08-24
Payer: MEDICARE

## 2023-08-24 VITALS
BODY MASS INDEX: 19.32 KG/M2 | HEIGHT: 62 IN | HEART RATE: 88 BPM | OXYGEN SATURATION: 99 % | WEIGHT: 105 LBS | SYSTOLIC BLOOD PRESSURE: 113 MMHG | TEMPERATURE: 98 F | DIASTOLIC BLOOD PRESSURE: 84 MMHG | RESPIRATION RATE: 16 BRPM

## 2023-08-24 DIAGNOSIS — L08.9 SKIN INFECTION: Primary | ICD-10-CM

## 2023-08-24 PROCEDURE — 99213 OFFICE O/P EST LOW 20 MIN: CPT | Performed by: NURSE PRACTITIONER

## 2023-08-24 RX ORDER — CEPHALEXIN 500 MG/1
500 CAPSULE ORAL 2 TIMES DAILY
Qty: 14 CAPSULE | Refills: 0 | Status: SHIPPED | OUTPATIENT
Start: 2023-08-24 | End: 2023-08-31

## 2023-08-24 NOTE — PATIENT INSTRUCTIONS
Wash area with soap and water   Complete entire course of antibiotics  Acetaminophen for pain  Monitor for healing. Follow-up with your PCP for increase in redness or pain, warmth, fever, red streaking, or discharge from wound.

## 2023-08-28 ENCOUNTER — OFFICE VISIT (OUTPATIENT)
Dept: INTERNAL MEDICINE CLINIC | Facility: CLINIC | Age: 78
End: 2023-08-28
Payer: MEDICARE

## 2023-08-28 VITALS
HEIGHT: 62 IN | OXYGEN SATURATION: 99 % | TEMPERATURE: 98 F | HEART RATE: 75 BPM | RESPIRATION RATE: 18 BRPM | WEIGHT: 111 LBS | SYSTOLIC BLOOD PRESSURE: 110 MMHG | BODY MASS INDEX: 20.43 KG/M2 | DIASTOLIC BLOOD PRESSURE: 60 MMHG

## 2023-08-28 DIAGNOSIS — I10 ESSENTIAL HYPERTENSION: Primary | ICD-10-CM

## 2023-08-28 PROBLEM — Q82.8 POROKERATOSIS: Status: ACTIVE | Noted: 2023-04-29

## 2023-08-28 PROCEDURE — 99213 OFFICE O/P EST LOW 20 MIN: CPT | Performed by: FAMILY MEDICINE

## 2023-08-28 NOTE — PROGRESS NOTES
Subjective:   Patient ID: Mila Romero is a [de-identified]year old female. HPI Here for f/u on BP. Taking med as prescribed with no issues. History/Other:   Review of Systems   Respiratory:  Negative for chest tightness and shortness of breath. Cardiovascular:  Negative for chest pain and palpitations. Current Outpatient Medications   Medication Sig Dispense Refill    cephalexin 500 MG Oral Cap Take 1 capsule (500 mg total) by mouth 2 (two) times daily for 7 days. 14 capsule 0    mupirocin 2 % External Ointment Apply 1 Application topically 3 (three) times daily. Apply 3 times daily for 10 days. Apply to scabbed area. 22 g 0    meclizine 25 MG Oral Tab Take 1 tablet (25 mg total) by mouth 3 (three) times daily as needed. 30 tablet 0    raloxifene 60 MG Oral Tab Take 1 tablet (60 mg total) by mouth daily. 90 tablet 0    OMEPRAZOLE 20 MG Oral Capsule Delayed Release TAKE ONE CAPSULE BY MOUTH EVERY MORNING 90 capsule 0    LISINOPRIL 10 MG Oral Tab TAKE ONE TABLET BY MOUTH ONCE DAILY 90 tablet 1    Multiple Vitamins-Minerals (CENTRUM) Oral Tab Take 1 tablet by mouth daily. Allergies:  Bisphosphonates         OTHER (SEE COMMENTS)    Comment:Sig bone pain    Objective:   Physical Exam  Vitals reviewed. Constitutional:       Appearance: Normal appearance. She is well-developed. HENT:      Head: Normocephalic and atraumatic. Cardiovascular:      Rate and Rhythm: Normal rate and regular rhythm. Heart sounds: Normal heart sounds. Pulmonary:      Effort: Pulmonary effort is normal.      Breath sounds: Normal breath sounds. Neurological:      Mental Status: She is alert. Psychiatric:         Mood and Affect: Mood normal.         Behavior: Behavior normal.         Assessment & Plan:   Essential hypertension  (primary encounter diagnosis)   BP controlled on med. Continue. No orders of the defined types were placed in this encounter.       Meds This Visit:  Requested Prescriptions      No prescriptions requested or ordered in this encounter       Imaging & Referrals:  None

## 2023-09-01 ENCOUNTER — TELEPHONE (OUTPATIENT)
Dept: UROLOGY | Facility: CLINIC | Age: 78
End: 2023-09-01

## 2023-09-05 ENCOUNTER — OFFICE VISIT (OUTPATIENT)
Dept: UROLOGY | Facility: CLINIC | Age: 78
End: 2023-09-05
Attending: OBSTETRICS & GYNECOLOGY
Payer: MEDICARE

## 2023-09-05 VITALS — RESPIRATION RATE: 16 BRPM | HEIGHT: 62 IN | WEIGHT: 111 LBS | BODY MASS INDEX: 20.43 KG/M2

## 2023-09-05 DIAGNOSIS — N81.11 CYSTOCELE, MIDLINE: ICD-10-CM

## 2023-09-05 DIAGNOSIS — N81.6 RECTOCELE: ICD-10-CM

## 2023-09-05 DIAGNOSIS — N81.2 INCOMPLETE UTEROVAGINAL PROLAPSE: Primary | ICD-10-CM

## 2023-09-05 DIAGNOSIS — N39.3 STRESS INCONTINENCE: ICD-10-CM

## 2023-09-05 LAB
BLOOD URINE: NEGATIVE
CONTROL RUN WITHIN 24 HOURS?: YES
LEUKOCYTE ESTERASE URINE: NEGATIVE
NITRITE URINE: NEGATIVE

## 2023-09-05 PROCEDURE — 51797 INTRAABDOMINAL PRESSURE TEST: CPT

## 2023-09-05 PROCEDURE — 51741 ELECTRO-UROFLOWMETRY FIRST: CPT

## 2023-09-05 PROCEDURE — 51729 CYSTOMETROGRAM W/VP&UP: CPT

## 2023-09-05 PROCEDURE — 81002 URINALYSIS NONAUTO W/O SCOPE: CPT

## 2023-09-05 PROCEDURE — 51784 ANAL/URINARY MUSCLE STUDY: CPT

## 2023-09-05 NOTE — PROCEDURES
Patient here for urodynamic testing. Procedure explained and confirmed by patient. See evaluation form for results. Both verbal and written discharge instructions were given. Patient tolerated procedure well and will follow up with Dr. Abisai Mercedes on 23. URODYNAMIC EVALUATION    PATIENT HISTORY:    Prolapse:  Yes - bothersome  KAREN:  No  UUI:  Yes -only if she waits too long to get into BR - liner is at times only damp  Nocturia:  2  Frequency:  voids at 2 hour vilma  Sense of Incomplete Emptying:  Yes ( but actually unsure)  Constipation:  No  Last void prior to UDS testin hours  Current urge to void? Strong  OAB meds stopped prior to test?  NA  Other symptoms? Surgery? []  No  []  Interested in surgery:   [x]  Yes, specify date: 23 TOTAL VAGINAL HYSTERECTOMY POSSIBLE BILATERAL SALPINGECTOMY, UTEROSACRAL LIGAMENT SUSPENSION ANTERAIL /POSTERIOR /ENTEROCELE POSSIBLE PLACEMENT OF MID URETRAL SLING CYSTOSCOPY     Surgical folder provided? [x]  Yes  - reports that she has a folder - IUG consent has not been signed  []  No     PATIENT DIAGNOSIS:  Cystocele, Midline N81.11, Rectocele, Midline R15.9, Stress Incontinence N39.3, and Uterovaginal Prolapse N81.2 (incomplete)    UDS PROCEDURAL FINDINGS:  Incomplete Bladder Emptying R39.14 and Detrusor Instability N31.9    MEDICATION: raloxifene 60 MG Oral Tab, Take 1 tablet (60 mg total) by mouth daily. , Disp: 90 tablet, Rfl: 0  OMEPRAZOLE 20 MG Oral Capsule Delayed Release, TAKE ONE CAPSULE BY MOUTH EVERY MORNING, Disp: 90 capsule, Rfl: 0  LISINOPRIL 10 MG Oral Tab, TAKE ONE TABLET BY MOUTH ONCE DAILY, Disp: 90 tablet, Rfl: 1  Multiple Vitamins-Minerals (CENTRUM) Oral Tab, Take 1 tablet by mouth daily. , Disp: , Rfl:          ALLERGIES:  Bisphosphonates      EXAM:  Urinalysis Dip:  Today's Results   Component Date Value    control run 2023 Yes     Blood Urine 2023 Negative     Nitrite Urine 2023 Negative     Leukocyte esterase urine 09/05/2023 Negative       Urovesico Junction ( >30 degrees ):  [x]  Mobile  []  Fixed    Perineal Sensation:  [x]  Normal  []  Abnormal    Additional Notes:    PROLAPSE:  [x]  Yes  []  No  Prolapse reduced for testing? []  Yes  []  No  [x]  Pessary # 2 ring with support  []  Manual  []  Half Speculum    Additional Notes:    UROFLOWMETRY:  Unreduced  Voided Volume:                    340         mL  Maximum Flow Rate:                        25        mL/sec  Average flow rate:                         8    mL/sec  Post-void Residual:                     250      mL  Pattern:  []  Normal  []  Poor flow     [x]  Intermittent  []  Other  Void:   [x]  Typical  []  Atypical    Additional Notes: does not have a sense of incomplete emptying after void    CYSTOMETRY:  Urethral Catheter:  Fr 7 / tdoc  Abd Catheter:     Fr 7 / tdoc   Infusion:  Water Rate 30 mL/min  Temp:  Room  Position:  [x]  Sit  []  Stand  []  Supine  First sensation:   125 mL  First desire to void:   264 mL  Strong desire to void:  302 mL  Maximum cystometric capacity:    411 mL  Detrusor Activity:  [x] Unstable  []  Stable  Urge leakage? []  Yes [x]  No  Volume at 1st unhibited detrusor cont:   130 mL  Detrusor instability provoked by:    []  Spontaneous []  Coughing  []  Filling  []  Valsalva  []  Other    Additional Notes:      URETHRAL FUNCTION:  Valsava (vesical) Leak Point Pressures:    Volume Leak Point Pressure Leak? Cough Valsalva      100mL 58 20    cm H2O []  Yes [x]  No         REDUCED:  Volume Leak Point Pressure Leak? Cough Valsalva      100mL 57 39    cm H2O []  Yes [x]  No   150 mL 54 37    cm H2O []  Yes [x]  No   300 mL  78 37    cm H2O []  Yes [x]  No   400mL 95 46cm H2O []  Yes [x]  No     Weak cough, despite encouragement! Genuine Stress Incontinence demonstrated?    []  Yes  [x]  No    Resting Urethral Pressure Profile:     Functional Urethral Length:         0.5 cm                0.4 cm                 cm  Maximum UCP:          54 cm          64   cm                  cm    PRESSURE/FLOW STUDY:  Reduced  Voided volume:     318   mL  Maximum flow rate:       14 mL/sec  Pressure Detrusor (at maximum flow):           22 cm H2O  Post void residual:      175         mL  Voiding mechanism:  [x]  Abnormal  []  Normal  [x]  Strain to void   []  Weak detrusor  Void:   [x]  Typical   []  Atypical   DId feel she may have emptied better with pessary in place - however does not want to use a pessary for prolapse - device removed at end of testing  Additional Notes:  Uroflowmetry, cystometry, and pressure / flow study were completed using calibrated electronic equipment and air charged transducers.     EMG:  During fill: Reactive    During flow study: Reactive    9/5/2023 4:11 PM     PERFORMED BY:  Obinna Wood RN

## 2023-09-05 NOTE — PATIENT INSTRUCTIONS
[facility]  [address] [facility] PELVIC MEDICINE     Post-Operative Guidelines    AVOID CONSTIPATION - Take Miralax: one capful in water or juice each morning. You can also take each evening if needed. No lifting over 10 lbs. (1 gallon of milk is 8 lbs. )  It is okay to walk up and down stairs and to walk outside, but be careful not to become fatigued. Showers and tub baths are okay, even if you have a catheter or abdominal incision. You may ride in a car immediately. You may drive after 1-2 weeks. Please call us for any of the following:  Temperature above 100.5 for 4 hours or above 101.0 at any time  Chest pain or trouble breathing  Vaginal bleeding heavier than a period  Redness, tenderness or swelling of your legs  Pain or burning when you urinate  Redness, pain or foul discharge from incision          [facility]    What is a urinary catheter? A catheter is a soft tube used to drain urine from your bladder. Why do I need a catheter? A urinary catheter is used to help with healing immediately after surgery. It works to keep your bladder empty while you are recovering. Surgery, swelling and anesthesia can cause the bladder to lose its normal sensations for a while. The catheter may stay in place for a week or more after you go home. Where will I go to get the catheter removed? Your catheter will be removed in the office. Please call the office to schedule a voiding trial with the nurse. How will the catheter be removed? A nurse will disconnect your catheter from the drainage bag. She will attach a syringe to the catheter and fill the bladder with sterile water until you have a strong desire to urinate. The catheter will be removed and you will be able to go to the bathroom to empty your bladder. Will the catheter need to be replaced?   You will need to urinate a specific amount, depending on how much you were initially able to hold. Your catheter will only need to be replaced if you are not able to empty the specific amount. If the catheter is replaced, your nurse will discuss with you when you need to return. What do I do after the catheter is removed? For the first 24 hours, you should go to the bathroom with your first urge or every 2-3 hours while you are awake. Urinate before you go to bed and if you wake up in the night, you do not need to set an alarm to wake up. Drink plenty of water (6-8 glasses) slowly throughout the day. Avoid liquids containing caffeine. Continue with any pain medications (Motrin) as directed by the nurse. Continue with your current bowel regime; avoid constipation. What if I have trouble emptying my bladder? If you are having trouble emptying your bladder, try the following tips:  Urinate normally then stand up, walk around for a minute or two, sit back down on the commode and attempt to urinate (double void). Sit in a tub of warm water and try to urinate in the tub. Run warm water over your vaginal area while attempting to urinate. When should I call the office? If you are unable to urinate for 6 hours. You feel you need to urinate but cannot. Urinating frequently in small amounts. You experience abdominal bloating, pressure or back pain. You have a fever over 100.5 for 4 hours or above 101.0 anytime. You have nausea and/or vomiting. Burning/pain with urination. Please feel free to call the nurse at [phone] with any questions 8am-4:30pm Monday-Friday. If the office is closed, you may call the on call physician at [phone] or go to the emergency room. [address] [facility] UROGYNECOLOGY  [facility] [address]: [phone]  FAX: [phone]       Urodynamic Testing Discharge Instructions: There are NO dietary or activity restrictions. You may resume your normal schedule. You may have mild discomfort for a few hours after your testing today. There may be some mild burning when you urinate or you may see some blood in your urine. These problems should not last more than 24 hours. The following suggestions may minimize any symptoms you experience. Drink 6-8 large glasses of water over the next 8 hours  A compress or sitz bath may be soothing  Tylenol or Ibuprofen may be taken as needed    If you experience any of the following, please call the office or, if after hours, the on-call physician at [phone]. Excessive pain  Bright red bloody discharge  Fever or chills  Continued urgency, frequency or burning with urination    Obtaining Test Results    Your urodynamic test will be interpreted by a specialist and available to the referring physician within 7-14 days. Patients in our clinic are given an appointment to come back to discuss the results and any appropriate treatment recommendations. Please do not hesitate to contact our office with any questions or concerns at [phone]. I acknowledge that I have received verbal and written discharge  instructions and that I understand these instructions clearly.     Patient Signature:    Date:

## 2023-09-06 ENCOUNTER — OFFICE VISIT (OUTPATIENT)
Dept: UROLOGY | Facility: CLINIC | Age: 78
End: 2023-09-06
Attending: OBSTETRICS & GYNECOLOGY
Payer: MEDICARE

## 2023-09-06 VITALS — BODY MASS INDEX: 20.43 KG/M2 | RESPIRATION RATE: 20 BRPM | HEIGHT: 62 IN | WEIGHT: 111 LBS

## 2023-09-06 DIAGNOSIS — N81.11 CYSTOCELE, MIDLINE: ICD-10-CM

## 2023-09-06 DIAGNOSIS — N32.81 DETRUSOR INSTABILITY: ICD-10-CM

## 2023-09-06 DIAGNOSIS — N81.2 INCOMPLETE UTEROVAGINAL PROLAPSE: Primary | ICD-10-CM

## 2023-09-06 DIAGNOSIS — R33.9 INCOMPLETE EMPTYING OF BLADDER: ICD-10-CM

## 2023-09-06 DIAGNOSIS — N81.6 RECTOCELE: ICD-10-CM

## 2023-09-06 PROCEDURE — 99212 OFFICE O/P EST SF 10 MIN: CPT

## 2023-09-07 RX ORDER — DESONIDE 0.5 MG/ML
1 LOTION TOPICAL 2 TIMES DAILY
Qty: 118 ML | Refills: 0 | Status: SHIPPED | OUTPATIENT
Start: 2023-09-07

## 2023-09-07 RX ORDER — OMEPRAZOLE 20 MG/1
20 CAPSULE, DELAYED RELEASE ORAL EVERY MORNING
Qty: 90 CAPSULE | Refills: 0 | Status: SHIPPED | OUTPATIENT
Start: 2023-09-07

## 2023-09-07 RX ORDER — MECLIZINE HYDROCHLORIDE 25 MG/1
25 TABLET ORAL 3 TIMES DAILY PRN
Qty: 30 TABLET | Refills: 0 | Status: SHIPPED | OUTPATIENT
Start: 2023-09-07

## 2023-09-07 NOTE — TELEPHONE ENCOUNTER
Past Medical History:   Diagnosis Date    Premature atrial contractions     Seizure disorder        Past Surgical History:   Procedure Laterality Date     SECTION      ENDOMETRIAL ABLATION      HERNIA REPAIR      Tonsillectomy         Review of patient's allergies indicates:  No Known Allergies    No current facility-administered medications on file prior to encounter.     Current Outpatient Medications on File Prior to Encounter   Medication Sig    atorvastatin (LIPITOR) 20 MG tablet TAKE 1 TABLET BY MOUTH EVERY DAY    carBAMazepine (CARBATROL) 300 MG CM12 TAKE 1 CAPSULE BY MOUTH TWICE A DAY    metoprolol succinate (TOPROL-XL) 50 MG 24 hr tablet TAKE 1 TABLET BY MOUTH EVERY DAY    omeprazole (PRILOSEC OTC) 20 MG tablet Take 20 mg by mouth once daily.     Family History    None       Tobacco Use    Smoking status: Former    Smokeless tobacco: Never   Substance and Sexual Activity    Alcohol use: Yes     Comment: Socially    Drug use: No    Sexual activity: Yes     Partners: Male     Comment:      Review of Systems   Constitutional:  Positive for fatigue. Negative for activity change, chills and fever.   HENT:  Negative for trouble swallowing.    Eyes:  Negative for photophobia and visual disturbance.   Respiratory:  Negative for cough, chest tightness and shortness of breath.    Cardiovascular:  Positive for chest pain. Negative for palpitations and leg swelling.   Gastrointestinal:  Negative for abdominal pain, constipation, diarrhea, nausea and vomiting.   Genitourinary:  Negative for dysuria, frequency and hematuria.   Musculoskeletal:  Negative for back pain, gait problem and neck pain.   Skin:  Negative for rash and wound.   Neurological:  Negative for dizziness, syncope, speech difficulty and light-headedness.   Psychiatric/Behavioral:  Negative for agitation and confusion. The patient is not nervous/anxious.      Objective:     Vital Signs (Most Recent):  Temp: 97.6 °F (36.4 °C)  Per pt below, ok to schedule for anytime and send [address] Pt scheduled for 30 min appt 3/27 at 4:30. MC sent to pt. Hold for read. (09/07/23 1245)  Pulse: (!) 44 (09/07/23 1245)  Resp: 16 (09/07/23 1245)  BP: 124/61 (09/07/23 1245)  SpO2: 97 % (09/07/23 1245) Vital Signs (24h Range):  Temp:  [97.6 °F (36.4 °C)-97.8 °F (36.6 °C)] 97.6 °F (36.4 °C)  Pulse:  [39-93] 44  Resp:  [15-19] 16  SpO2:  [96 %-100 %] 97 %  BP: (124-168)/(60-78) 124/61     Weight: 69.4 kg (153 lb)  Body mass index is 28.91 kg/m².     Physical Exam  Vitals and nursing note reviewed.   Constitutional:       General: She is not in acute distress.     Appearance: She is well-developed.   HENT:      Head: Normocephalic and atraumatic.      Mouth/Throat:      Pharynx: No oropharyngeal exudate.   Eyes:      Conjunctiva/sclera: Conjunctivae normal.      Pupils: Pupils are equal, round, and reactive to light.   Cardiovascular:      Rate and Rhythm: Regular rhythm. Bradycardia present.      Heart sounds: Normal heart sounds.   Pulmonary:      Effort: Pulmonary effort is normal. No respiratory distress.      Breath sounds: Normal breath sounds. No wheezing.   Abdominal:      General: Bowel sounds are normal. There is no distension.      Palpations: Abdomen is soft.      Tenderness: There is no abdominal tenderness.   Musculoskeletal:         General: No tenderness. Normal range of motion.      Cervical back: Normal range of motion and neck supple.   Lymphadenopathy:      Cervical: No cervical adenopathy.   Skin:     General: Skin is warm and dry.      Capillary Refill: Capillary refill takes less than 2 seconds.      Findings: No rash.   Neurological:      Mental Status: She is alert and oriented to person, place, and time.      Cranial Nerves: No cranial nerve deficit.      Sensory: No sensory deficit.      Coordination: Coordination normal.   Psychiatric:         Behavior: Behavior normal.         Thought Content: Thought content normal.         Judgment: Judgment normal.              CRANIAL NERVES     CN III, IV, VI   Pupils are equal, round, and reactive to light.        Significant Labs: All pertinent labs within the past 24 hours have been reviewed.  CBC:   Recent Labs   Lab 09/07/23 0701   WBC 4.24   HGB 12.5   HCT 37.4        CMP:   Recent Labs   Lab 09/07/23 0701      K 4.4      CO2 22*   GLU 91   BUN 11   CREATININE 0.7   CALCIUM 9.4   PROT 6.8   ALBUMIN 3.8   BILITOT 0.4   ALKPHOS 84   AST 17   ALT 16   ANIONGAP 11     Cardiac Markers:   Recent Labs   Lab 09/07/23 0701   *     Troponin:   Recent Labs   Lab 09/07/23 0701 09/07/23 0917   TROPONINI <0.006 0.013     TSH:   Recent Labs   Lab 09/07/23 0701   TSH 0.658     Urine Studies:   Recent Labs   Lab 09/07/23 0751   COLORU Yellow   APPEARANCEUA Clear   PHUR 7.0   SPECGRAV 1.010   PROTEINUA Negative   GLUCUA Negative   KETONESU Negative   BILIRUBINUA Negative   OCCULTUA Negative   NITRITE Negative   LEUKOCYTESUR Negative       Significant Imaging: I have reviewed all pertinent imaging results/findings within the past 24 hours.  Imaging Results              X-Ray Chest PA And Lateral (Final result)  Result time 09/07/23 08:55:44      Final result by Jaiden Mendez MD (09/07/23 08:55:44)                   Impression:      See above      Electronically signed by: Jaiden Mendez MD  Date:    09/07/2023  Time:    08:55               Narrative:    EXAMINATION:  XR CHEST PA AND LATERAL    CLINICAL HISTORY:  Chest Pain;    TECHNIQUE:  PA and lateral views of the chest were performed.    COMPARISON:  None    FINDINGS:  Heart size normal.  The lungs are clear.  No pleural effusion                        Final result by Jaiden Mendez MD (09/07/23 08:55:44)                   Impression:      See above      Electronically signed by: Jaiden Mendez MD  Date:    09/07/2023  Time:    08:55               Narrative:    EXAMINATION:  XR CHEST PA AND LATERAL    CLINICAL HISTORY:  Chest Pain;    TECHNIQUE:  PA and lateral views of the chest were performed.    COMPARISON:  None    FINDINGS:  Heart size normal.  The  lungs are clear.  No pleural effusion                        Final result by Jaiden Mendez MD (09/07/23 08:55:44)                   Impression:      See above      Electronically signed by: Jaiden Mendez MD  Date:    09/07/2023  Time:    08:55               Narrative:    EXAMINATION:  XR CHEST PA AND LATERAL    CLINICAL HISTORY:  Chest Pain;    TECHNIQUE:  PA and lateral views of the chest were performed.    COMPARISON:  None    FINDINGS:  Heart size normal.  The lungs are clear.  No pleural effusion

## 2023-09-07 NOTE — TELEPHONE ENCOUNTER
Last OV 8/28/23   Last PE 2/13/23   Last REFILL    meclizine 25 MG Oral Tab 30 tablet 0 7/31/2023     OMEPRAZOLE 20 MG Oral Capsule Delayed Release 90 capsule 0 6/13/2023     Desonide 0.05 % External Lotion (Discontinued) 118 mL 0 7/31/2023 8/2/2023     Last LABS LIPID CBC CMP 2/6/23     Future Appointments   Date Time Provider [place]   10/9/2023  9:30 AM Sanjuana Mercado MD [facility] [place]   12/4/2023  8:00 AM BK [facility] [address]   12/4/2023  8:15 AM BK [address] [place] [address] [facility] [address]   12/11/2023 10:30 AM Greta Devries DO [facility] [address] [facility] [facility]   1/11/2024  8:30 AM LIS PROCEDURE L[facility] None   2/14/2024  9:20 AM MD ARTUR Jimenes None         Per PROTOCOL? No protocol.   Please Advise

## 2023-09-19 ENCOUNTER — OFFICE VISIT (OUTPATIENT)
Dept: UROLOGY | Facility: CLINIC | Age: 78
End: 2023-09-19
Attending: OBSTETRICS & GYNECOLOGY
Payer: MEDICARE

## 2023-09-19 VITALS — RESPIRATION RATE: 20 BRPM | WEIGHT: 111 LBS | BODY MASS INDEX: 20.43 KG/M2 | HEIGHT: 62 IN

## 2023-09-19 DIAGNOSIS — N81.11 CYSTOCELE, MIDLINE: ICD-10-CM

## 2023-09-19 DIAGNOSIS — N81.2 INCOMPLETE UTEROVAGINAL PROLAPSE: Primary | ICD-10-CM

## 2023-09-19 DIAGNOSIS — N81.84 PELVIC MUSCLE WASTING: ICD-10-CM

## 2023-09-19 DIAGNOSIS — N32.81 DETRUSOR INSTABILITY: ICD-10-CM

## 2023-09-19 DIAGNOSIS — N95.2 POST-MENOPAUSAL ATROPHIC VAGINITIS: ICD-10-CM

## 2023-09-19 DIAGNOSIS — R33.9 INCOMPLETE EMPTYING OF BLADDER: ICD-10-CM

## 2023-09-19 DIAGNOSIS — N81.6 RECTOCELE: ICD-10-CM

## 2023-09-19 PROCEDURE — 57160 INSERT PESSARY/OTHER DEVICE: CPT

## 2023-09-19 PROCEDURE — 87086 URINE CULTURE/COLONY COUNT: CPT | Performed by: PHYSICIAN ASSISTANT

## 2023-09-19 PROCEDURE — 51701 INSERT BLADDER CATHETER: CPT | Performed by: PHYSICIAN ASSISTANT

## 2023-09-19 PROCEDURE — 0UHG7GZ INSERTION OF PESSARY INTO VAGINA, VIA NATURAL OR ARTIFICIAL OPENING: ICD-10-PCS | Performed by: PHYSICIAN ASSISTANT

## 2023-09-19 RX ORDER — ESTRADIOL 0.1 MG/G
CREAM VAGINAL
Qty: 42.5 G | Refills: 3 | Status: SHIPPED | OUTPATIENT
Start: 2023-09-19

## 2023-09-19 NOTE — PROGRESS NOTES
Pt here for pessary trial due to bulge, incomplete bladder emptying  Wants to exhaust conservative options  + UI complaints  + KAREN complaints  Denies UTI's  Bowels regular with diet    Pt is not using vaginal cream twice weekly   Interested in self care    She has surgery scheduled on the 1/4/24 with Dr. Sheikh Cost:   09/19/23  1330   Resp: 20       GENERAL EXAM:  GENERAL:  NAD  HEAD: NC/AT  EYES: symmetric bilaterally. No icterus. Sclera w/o injection  NECK: no masses  LUNGS:  Normal resp effort  ABDOMEN:  Soft, no mass  MUSK: normal gait, ROM wnl. No edema  PSYCH: A&Ox3. Recent and remote memory intact    PELVIC EXAM:  Ext. Gen: +atrophy, no lesions  Urethra: +atrophy, nontender,   Bladder:+fullness, nontender  Vagina: +atrophy, no lesions  Cervix: no bleeding, no lesions, nontender  Uterus: +mobile    PELVIC SUPPORT:  [place]: 3  Anterior: 3  Posterior: 3    A #2 ring with support was placed without difficulty. 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 Reviewed associated benefits, risks, alternatives, and goals.  Recommend low dose twice weekly mgmt   Discussed management of pelvic organ prolapse including but not limited to behavioral modifications, conservative options, and surgical management. Discussed pessary management including benefits and risks. Discussed importance of keeping regularly scheduled pessary checks in prevention of complications related to pessary use. Continue pessary management office care  Start vaginal estrogen cream twice weekly  Call with s/sx of UTI, problems with pessary   All questions answered  She understands and agrees to plan      Return in about 4 weeks (around 10/17/2023) for pessary care, sooner prn .       Mychal Dave PA-C

## 2023-10-03 NOTE — TELEPHONE ENCOUNTER
Last OV 8/28/23   Last PE 2/13/23   Last REFILL   Desonide 0.05 % External Lotion 118 mL 0 9/7/2023     Last LABS Lipid cbc cmp 2/6/23    Future Appointments   Date Time Provider [place]   10/19/2023  8:40 AM KISHOR Williamson None   11/14/2023  2:15 PM Kody Base, PT [facility]   11/21/2023 10:45 AM Kody Base, PT [facility]   11/28/2023 10:00 AM Kody Base, PT [facility]   12/4/2023  8:00 AM BK [facility] [address]   12/4/2023  8:15 AM BK [facility] [address]   12/5/2023  9:15 AM Kody Base, PT SNPT [facility]   12/11/2023 10:30 AM Viveca Moder, DO [facility]   12/12/2023  9:15 AM Kody Base, PT [facility]   12/19/2023  9:15 AM Kody Base, PT [facility]   1/11/2024  8:30 AM LIS PROCEDURE L[facility]O None   2/14/2024  9:20 AM MD ARTUR Montes None         Per PROTOCOL? No protocol.   Please Advise

## 2023-10-04 RX ORDER — DESONIDE 0.5 MG/ML
1 LOTION TOPICAL 2 TIMES DAILY
Qty: 118 ML | Refills: 0 | Status: SHIPPED | OUTPATIENT
Start: 2023-10-04

## 2023-10-19 ENCOUNTER — OFFICE VISIT (OUTPATIENT)
Dept: UROLOGY | Facility: CLINIC | Age: 78
End: 2023-10-19
Attending: OBSTETRICS & GYNECOLOGY
Payer: MEDICARE

## 2023-10-19 VITALS — TEMPERATURE: 98 F

## 2023-10-19 DIAGNOSIS — N81.2 INCOMPLETE UTEROVAGINAL PROLAPSE: Primary | ICD-10-CM

## 2023-10-19 DIAGNOSIS — N81.84 PELVIC MUSCLE WASTING: ICD-10-CM

## 2023-10-19 DIAGNOSIS — R33.9 INCOMPLETE EMPTYING OF BLADDER: ICD-10-CM

## 2023-10-19 DIAGNOSIS — N95.2 POST-MENOPAUSAL ATROPHIC VAGINITIS: ICD-10-CM

## 2023-10-19 PROCEDURE — 87086 URINE CULTURE/COLONY COUNT: CPT | Performed by: PHYSICIAN ASSISTANT

## 2023-10-19 PROCEDURE — 57160 INSERT PESSARY/OTHER DEVICE: CPT

## 2023-10-19 PROCEDURE — 99212 OFFICE O/P EST SF 10 MIN: CPT

## 2023-10-19 PROCEDURE — 81002 URINALYSIS NONAUTO W/O SCOPE: CPT | Performed by: PHYSICIAN ASSISTANT

## 2023-11-10 RX ORDER — MECLIZINE HYDROCHLORIDE 25 MG/1
25 TABLET ORAL 3 TIMES DAILY PRN
Qty: 30 TABLET | Refills: 0 | Status: SHIPPED | OUTPATIENT
Start: 2023-11-10

## 2023-11-10 RX ORDER — RALOXIFENE HYDROCHLORIDE 60 MG/1
60 TABLET, FILM COATED ORAL DAILY
Qty: 90 TABLET | Refills: 0 | Status: SHIPPED | OUTPATIENT
Start: 2023-11-10

## 2023-11-10 NOTE — TELEPHONE ENCOUNTER
Requested Prescriptions     Pending Prescriptions Disp Refills    MECLIZINE 25 MG Oral Tab [Pharmacy Med Name: Meclizine Hydrochloride 25 Mg Tab Zydu] 30 tablet 0     Sig: Take 1 tablet (25 mg total) by mouth 3 (three) times daily as needed. RALOXIFENE 60 MG Oral Tab [Pharmacy Med Name: Raloxifene Hydrochloride 60 Mg Tab Nort] 90 tablet 0     Sig: TAKE ONE TABLET BY MOUTH ONCE DAILY       LOV: 8--blood pressure     LAST CPE: 2--physical    Last Labs:  2-6-2023-lipid,cbc     Last Refill: 9-7-2023-90 tabs with 0 refills     Your appointments       Date & Time Appointment Department [facility])    Nov 14, 2023  8:40 AM CST Uro Follow Up Pre/Post Op with Micael Favre [facility] for Pelvic [address] Urogynecology (--)        Nov 14, 2023  2:15 PM CST Pelvic Health Eval with Kody Base, [facility] in [place] ([address] [place] [address])        Nov 21, 2023 10:45 AM CST Pelvic Health Treatment with Kody Base, [facility] in [place] ([address] [place] [address])        Nov 28, 2023 10:00 AM CST Pelvic Health Treatment with Kody Base, [facility] in [place] ([address] [place] [address])        Dec 04, 2023  8:00 AM CST ADULT EKG with Glory Chavira [facility] ([employer])    Please wear slacks and a top for exam. Do not wear a dress.         Dec 04, 2023  8:15 AM CST Laboratory Services with Glory Chavira [facility] ([employer])        Dec 05, 2023  9:15 AM CST Pelvic Health Treatment with Kody Base, [facility] in [place] ([address] [place] [address])        Dec 11, 2023 10:30 AM CST Presurgical Visit with DO Isaías Solomon [place], [place] ([facility] IM/[facility])        Dec 12, 2023  9:15 AM CST Pelvic Health Treatment with Kody Mccarty, [facility] in [place] ([address] [place] [address])        Dec 19, 2023  9:15 AM CST Pelvic Health Treatment with Kian Jones PT [facility] in [place] ([address] [place] [address])        Jan 11, 2024  8:30 AM CST Uro Voiding Trial with LIS PROCEDURE [facility] for Pelvic Medicine - [facility] (--)        Feb 14, 2024  9:20 AM CST Uro Follow Up Pre/Post Op with Mala Sanchez MD [facility] for Pelvic [address] Urogynecology (--)              [facility] in [place] [address] [address] Todd Montoya [address] [address]  [phone] [address]  [address] IM/FM [place] [id number] [phone]    [facility] for Pelvic Medicine - [address] [place] [address] [phone]

## 2023-11-14 ENCOUNTER — OFFICE VISIT (OUTPATIENT)
Dept: UROLOGY | Facility: CLINIC | Age: 78
End: 2023-11-14
Attending: OBSTETRICS & GYNECOLOGY
Payer: MEDICARE

## 2023-11-14 ENCOUNTER — OFFICE VISIT (OUTPATIENT)
Dept: PHYSICAL THERAPY | Age: 78
End: 2023-11-14
Attending: PHYSICIAN ASSISTANT
Payer: MEDICARE

## 2023-11-14 VITALS
SYSTOLIC BLOOD PRESSURE: 120 MMHG | BODY MASS INDEX: 20.43 KG/M2 | WEIGHT: 111 LBS | HEIGHT: 62 IN | DIASTOLIC BLOOD PRESSURE: 65 MMHG

## 2023-11-14 DIAGNOSIS — N81.2 INCOMPLETE UTEROVAGINAL PROLAPSE: Primary | ICD-10-CM

## 2023-11-14 DIAGNOSIS — N32.81 DETRUSOR INSTABILITY: ICD-10-CM

## 2023-11-14 DIAGNOSIS — R33.9 INCOMPLETE EMPTYING OF BLADDER: ICD-10-CM

## 2023-11-14 DIAGNOSIS — N81.84 PELVIC MUSCLE WASTING: ICD-10-CM

## 2023-11-14 DIAGNOSIS — N95.2 POST-MENOPAUSAL ATROPHIC VAGINITIS: ICD-10-CM

## 2023-11-14 PROCEDURE — 97110 THERAPEUTIC EXERCISES: CPT

## 2023-11-14 PROCEDURE — 97161 PT EVAL LOW COMPLEX 20 MIN: CPT

## 2023-11-14 PROCEDURE — 57160 INSERT PESSARY/OTHER DEVICE: CPT

## 2023-11-14 PROCEDURE — 51701 INSERT BLADDER CATHETER: CPT

## 2023-11-14 PROCEDURE — 99212 OFFICE O/P EST SF 10 MIN: CPT

## 2023-11-14 NOTE — PROGRESS NOTES
Pt presents to follow up bulge  Doing well with #3 ring with support pessary, office care  Reports pessary is comfortable but after 8-10 hrs of standing feels bulge and difficulty emptying her bladder  Denies discharge  Denies bleeding  Denies s/sx of UTI  Bowels regular  Pt is using vaginal estrogen cream twice weekly    She is scheduled for surgery with Dr. Vandana Quintero 1/4/24, unsure if she wants surgery or to continue with pessary  PT for incomplete bladder emptying, starting today    Urodynamic testing on 9/5/23.  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 No demonstrable KAREN with cough or Valsalva at any volumes tested with or without prolapse reduced. Pressure/flow study: Voided 318 mL with PVR of 175 mL. Urogynecology Summary:  Urogynecology Summary  Prolapse: No (b/c pessary)  KAREN: No  Urge Incontinence: No  Nocturia Frequency: 1  Frequency: 2 - 3 hours  Incomplete emptying: No  Constipation: No  Wears pad day?: 1  Wears Pad Night?: 1  Activities are limited by UI/POP?: No  Currently Sexually Active: No      Vitals:  Vitals:    11/14/23 0844   BP: 120/65       GENERAL EXAM:  GENERAL:  NAD  HEAD: NC/AT  EYES: symmetric bilaterally. No icterus. Sclera w/o injection  NECK: no masses  LUNGS:  Normal resp effort  ABDOMEN:  Soft, no mass  MUSK: normal gait, ROM wnl. No edema  PSYCH: A&Ox3. Recent and remote memory intact    PELVIC EXAM:  Ext.  Gen: +atrophy, no lesions  Urethra: +atrophy, nontender,   Bladder:+fullness, nontender  Vagina: +atrophy, no lesions  Cervix: no bleeding, no lesions, nontender  Uterus: +mobile     PELVIC SUPPORT:  [place]: 3  Anterior: 3  Posterior: 3    Her pessary was removed, cleaned, and given back to patient  #2 long stem gellhorn pessary placed, comfortable  Patient able to void in the toilet 350cc  Sterile technique used to prep urethra and collect urine, PVR 120cc, clear, pale yellow    Impression/Plan:    ICD-10-CM 1. Incomplete uterovaginal prolapse  N81.2       2. Incomplete emptying of bladder  R33.9       3. Pelvic muscle wasting  N81.84       4. Post-menopausal atrophic vaginitis  N95.2       5. Detrusor instability  N32.81             Discussion Items:   Discussed mgmt of vulvovaginal atrophy with vaginal estrogen cream. Reviewed associated benefits, risks, alternatives, and goals. Recommend low dose twice weekly mgmt   Discussed management of pelvic organ prolapse including but not limited to behavioral modifications, conservative options, and surgical management. Discussed pessary management including benefits and risks. Discussed importance of keeping regularly scheduled pessary checks in prevention of complications related to pessary use. Continue pessary management, office care  Continue PT for incomplete bladder emptying  Continue vag estrogen twice weekly  Daily pelvic exercises  Bowel management reviewed  Call with s/sx of UTI, problems with pessary   All questions answered  Pt understands and agrees to plan       Return in about 4 weeks (around 12/12/2023) for pessary care, sooner prn .       Fredi [place] PA-C

## 2023-11-15 NOTE — PROGRESS NOTES
MUSCULOSKELETAL AND PELVIC FLOOR EVALUATION:     Diagnosis:         Incomplete uterovaginal prolapse (N81.2)  Incomplete emptying of bladder (R33.9)  Pelvic muscle wasting (M06.85) Referring Provider: Laverne Castellon  Date of Evaluation:    2023    Precautions:  None Next MD visit:   none scheduled  Date of Surgery: n/a     PATIENT SUMMARY   Landy Alonzo is a [de-identified]year old female  who presents to therapy today with complaints of  incomplete emptying. She did have some complaints of bulge and heaviness in the vaginal area, the pessary is helping, she just came from the MD and was issued a different pessary. She will return in 4 weeks. Not sure if this one is the proper fit. Pt works full time at [employer]  does not have any leakage at this time. She is scheduled for a hysterectomy in , however she may not go through with it since the pessary is helping her symptoms per pt. Current symptoms include: incomplete emptying    Pt describes pain level:  pt was in PT for her low back and is continuing to perform her HEP   Pregnant Now: No  Obstetrical/Gynecological history: : 6 vaginal births 8 # each   Urodynamic Test: NA  Manometry: NA  Occupation/Activities: works at [employer]   PFDI-20: 98.96/300;    Regan Angel describes prior level of function I. Pt goals include improve the strength of the PF . Past medical history was reviewed with Regan Angel.  Significant findings include   Past Medical History:   Diagnosis Date    Osteoarthritis     Osteoporosis     Skin disorder     Unspecified essential hypertension     Vertigo         URINARY HABITS  Types of symptoms: incomplete emptying  Events associated with the onset of urinary complaints:   has to sit back down soon after voiding feeling like she has not emptied all the way   Abdominal/Vaginal Pressure complaints:  improved with pessary   Urinary Frequency: normal   Urine Stream: normal   Amount: normal   Leaking occurs: none  Episodes of Leakage: none times per Patient Instructions by Aneta Cuadra none  Amount of leakage:  none   Pad use:  panty liner for cleanliness. Pad Change frequency: 1 x   Nocturia:  1 x   Fluid Intake:  water, coffee, cranberry or apple juice   Bladder irritants: none noted   Urine Stop test: not assessed   Post void dribble: no  Hovering: no  Empty bladder just in case: no  Do you ever leak urine without knowing it? no    BOWEL HABITS  Types of symptoms: normal   Frequency of bowel movements: 1 x a day   Stool consistency: [place] Stool Scale: 3  Do you strain with defecation: No   Laxative use: No  eats fruits and veggies     SEXUAL HEALTH STATUS  Marinoff Scale: NA  History of Sexual Abuse: NA  Sexual [place] Status: not active   Pain with initial and/or deep penetration:  NA  Pain with pelvic exam/tampon use: NA    ASSESSMENT  Nicole Solis presents to physical therapy evaluation with primary c/o loss of PF strength . The results of the objective tests and measures show loss of strength, hardly any contraction today . Functional deficits include but are not limited to  not able to contract properly and may be contributing to the incomplete emptying . Signs and symptoms are consistent with diagnosis of  PF weakness and loss of tone . Pt and PT discussed evaluation findings, pathology, POC and HEP. Pt voiced understanding and performs HEP correctly without reported pain. Skilled Pelvic Physical Therapy is medically necessary to address the above impairments and reach functional goals. OBJECTIVE:   Posture: rounded shoulders- kyphotic in nature   Pelvic Alignment: NA  Deep Tendon Reflexes:  Patella: RNA, LNA; Achilles: RNA, LnA  Gait: pt ambulates on level ground with normal mechanics.      External Palpation: NA  Scars (location/surgery): none   Abdominal Wall Integrity: na  Diastasis Recti: NA    Range Of Motion  Lumbar AROM screen: NA  LE AROM screen: grossly WNL except: nA due to time restraints     Strength (MMT) 5/5 JOSEFINA LE except NA  Transverse Abdominis: NA/5    Flexibility Summary: WNL JOSEFINA LE except NA    Special Tests  NA    Informed consent for internal pelvic evaluation given: Yes    External Observation:   Voluntary contraction: present   Voluntary relaxation: present  Involuntary contraction: absent  Involuntary relaxation: absent    Mons pubis: WNL  Labia majora: WNL  Labia minora: WNL  Urethral meatus: WNL  Introitus: WNL  Perineal body: WNL  uses estrace 2 x a week     Sensory/Reflex:  Vestibule: Not Tested  Anal Whitman: Not Tested    Internal Examination   Scar: NA    Pelvic Floor Muscle strength: (PERF= Power/Endurance/Reps/Fast) MMT:    1+/5 contraction today on both sides  Pt could fast contract on the L 3   R 4       External Anal Sphincter: NA  Accessory Muscle Use:  use of the adductors, abdominals and was bridging with the contractions    Tissue Laxity Test:  pessary in place today   Anterior Wall:   Posterior Wall:   Apical:     Eccentric lengthening contraction: NA  Bearing down Valsalva maneuver (2-3x): NA    Internal Palpation: WNL except    no increased tone, pessary in place today     Today's Treatment and Response:  issued exercises in hook lying with legs rested for the PF contractions. Discussed not straining with BM  Patient education was provided on objective findings of external and internal evaluation and expectations with treatment outcomes.  Educated on drinking water and making sure she moved around, sit to stand and rocking side to side     Patient was instructed in and issued a HEP for: diet/bladder/bowel diary     Charges: PT Eval Low Complexity, EX 1       Total Timed Treatment: 45 min     Total Treatment Time: 45 min     Based on clinical rationale and outcome measures, this evaluation involved Low Complexity decision making due to 1-2 personal factors/comorbidities, 3 body structures involved/activity limitations, and evolving symptoms including  incomplete emptying   PLAN OF CARE:    Goals: (to be met in 8 visits)  Pt to be I [id number] [employer]. [address] [place] [address], [place] [address]. All rights reserved. This information is not intended as a substitute for professional medical care. Always follow your healthcare professional's instructions.                 with HEP   Pt to demo 10 second contractions on bio feedback at 10 mv to decrease incomplete emptying   Pt to demo 10 fast contractions in 10 secs on bio feedback for improved strength   Pt will not have to go back to void due to incomplete emptying   Pt to demo full PF contraction without the use of other muscles       Frequency / Duration: Patient will be seen for 1 x/week or a total of 8 visits over a 90 day period. Treatment will include: Therapeutic Exercise , increase PF strength without the use of the accessory muscles     Education or treatment limitation: None  Rehab Potential:good      Patient/Family/Caregiver was advised of these findings, precautions, and treatment options and has agreed to actively participate in planning and for this course of care. Thank you for your referral. Please co-sign or sign and return this letter via fax as soon as possible to [phone]. If you have any questions, please contact me at Dept: [phone]    Sincerely,  Electronically signed by therapist: Agustin Gray PT  [de-identified] certification required: Yes  I certify the need for these services furnished under this plan of treatment and while under my care.     X___________________________________________________ Date____________________    Certification From: 40/25/0571  To:2/13/2024

## 2023-11-21 ENCOUNTER — OFFICE VISIT (OUTPATIENT)
Dept: PHYSICAL THERAPY | Age: 78
End: 2023-11-21
Attending: PHYSICIAN ASSISTANT
Payer: MEDICARE

## 2023-11-21 PROCEDURE — 97110 THERAPEUTIC EXERCISES: CPT

## 2023-11-21 NOTE — PROGRESS NOTES
Diagnosis:   Incomplete uterovaginal prolapse (N81.2)  Incomplete emptying of bladder (R33.9)  Pelvic muscle wasting (N81.84)      Referring Provider: David Ramesh  Date of Evaluation:    11/14/23    Precautions:  None Next MD visit:   none scheduled  Date of Surgery: n/a   Insurance Primary/Secondary: MEDICARE / COMMERCIAL     # Auth Visits: 8            Subjective:  complaint with HEP. I feel that this pessary is helping, fits better. May cancel the surgery     Pain: 0 /10      Objective:  1+/5 LA with contractions       Assessment: pt has tendency to use the   Gluts and lift with her exercises- cues to not hold her breathe. Goals:   Pt to be I with HEP   Pt to demo 10 second contractions on bio feedback at 10 mv to decrease incomplete emptying   Pt to demo 10 fast contractions in 10 secs on bio feedback for improved strength   Pt will not have to go back to void due to incomplete emptying   Pt to demo full PF contraction without the use of other muscles        Plan:  cont with PF and core strength   Date: 11/21/2023  TX#: 2/8 Date:                 TX#: 3/ Date:                 TX#: 4/ Date:                 TX#: 5/ Date:    Tx#: 6/   5 fast contractions  5 contractions  Add with ball 2 seconds with PF 10 x   10 ball add no PF   Band abd with PF 10 x   No PF with red band  Bridge 10 x   With PF contractions    Core:  PPT with marching 10 x   Bridge with marching 10 x                               HEP:  see above     Charges: EX 3       Total Timed Treatment: 45 min  Total Treatment Time: 45 min

## 2023-11-22 RX ORDER — DESONIDE 0.5 MG/ML
1 LOTION TOPICAL 2 TIMES DAILY
Qty: 118 ML | Refills: 0 | Status: SHIPPED | OUTPATIENT
Start: 2023-11-22

## 2023-11-28 ENCOUNTER — OFFICE VISIT (OUTPATIENT)
Dept: PHYSICAL THERAPY | Age: 78
End: 2023-11-28
Attending: PHYSICIAN ASSISTANT
Payer: MEDICARE

## 2023-11-28 PROCEDURE — 97110 THERAPEUTIC EXERCISES: CPT

## 2023-11-28 NOTE — PROGRESS NOTES
Diagnosis:   Incomplete uterovaginal prolapse (N81.2)  Incomplete emptying of bladder (R33.9)  Pelvic muscle wasting (N81.84)      Referring Provider: Vin Valerio  Date of Evaluation:    11/14/23    Precautions:  None Next MD visit:   none scheduled  Date of Surgery: n/a   Insurance Primary/Secondary: MEDICARE / COMMERCIAL     # Auth Visits: 8            Subjective:     Pain: 0 /10      Objective:  2/5 LA with contractions  loss after 1-2 seconds       Assessment:  improved contractions today, cannot hold for more then 1-2 seconds  Improved ability not to use the gluts, thighs, pt works standing, so added a few standing exercises today     Goals:   Pt to be I with HEP   Pt to demo 10 second contractions on bio feedback at 10 mv to decrease incomplete emptying   Pt to demo 10 fast contractions in 10 secs on bio feedback for improved strength   Pt will not have to go back to void due to incomplete emptying   Pt to demo full PF contraction without the use of other muscles        Plan:  cont with PF and core strength   Date: 11/21/2023  TX#: 2/8 Date:   11/28/23               TX#: 3/8 Date:                 TX#: 4/ Date:                 TX#: 5/ Date:    Tx#: 6/   5 fast contractions  5 contractions  Add with ball 2 seconds with PF 10 x   10 ball add no PF   Band abd with PF 10 x   No PF with red band  Bridge 10 x   With PF contractions    Core:  PPT with marching 10 x   Bridge with marching 10 x    Re assess 10 mins B LA  Standing ball to shoulder with PF contractions 10 x   YTB lateral and front walking with PF contractions 10 x each                            HEP:  see above     Charges: EX 3       Total Timed Treatment: 45 min  Total Treatment Time: 45 min

## 2023-12-04 ENCOUNTER — LABORATORY ENCOUNTER (OUTPATIENT)
Dept: LAB | Age: 78
End: 2023-12-04
Attending: FAMILY MEDICINE
Payer: MEDICARE

## 2023-12-04 ENCOUNTER — EKG ENCOUNTER (OUTPATIENT)
Dept: LAB | Age: 78
End: 2023-12-04
Attending: FAMILY MEDICINE
Payer: MEDICARE

## 2023-12-04 DIAGNOSIS — N81.4 UTEROVAGINAL PROLAPSE: ICD-10-CM

## 2023-12-04 DIAGNOSIS — Z01.818 PREOPERATIVE EXAMINATION: ICD-10-CM

## 2023-12-04 LAB
ANION GAP SERPL CALC-SCNC: 2 MMOL/L (ref 0–18)
ATRIAL RATE: 80 BPM
BASOPHILS # BLD AUTO: 0.06 X10(3) UL (ref 0–0.2)
BASOPHILS NFR BLD AUTO: 1.3 %
BUN BLD-MCNC: 11 MG/DL (ref 9–23)
CALCIUM BLD-MCNC: 9.4 MG/DL (ref 8.5–10.1)
CHLORIDE SERPL-SCNC: 110 MMOL/L (ref 98–112)
CO2 SERPL-SCNC: 28 MMOL/L (ref 21–32)
CREAT BLD-MCNC: 0.75 MG/DL
EGFRCR SERPLBLD CKD-EPI 2021: 80 ML/MIN/1.73M2 (ref 60–?)
EOSINOPHIL # BLD AUTO: 0.16 X10(3) UL (ref 0–0.7)
EOSINOPHIL NFR BLD AUTO: 3.4 %
ERYTHROCYTE [DISTWIDTH] IN BLOOD BY AUTOMATED COUNT: 13.2 %
FASTING STATUS PATIENT QL REPORTED: YES
GLUCOSE BLD-MCNC: 86 MG/DL (ref 70–99)
HCT VFR BLD AUTO: 37.9 %
HGB BLD-MCNC: 12.3 G/DL
IMM GRANULOCYTES # BLD AUTO: 0.01 X10(3) UL (ref 0–1)
IMM GRANULOCYTES NFR BLD: 0.2 %
LYMPHOCYTES # BLD AUTO: 1.52 X10(3) UL (ref 1–4)
LYMPHOCYTES NFR BLD AUTO: 32.5 %
MCH RBC QN AUTO: 31.8 PG (ref 26–34)
MCHC RBC AUTO-ENTMCNC: 32.5 G/DL (ref 31–37)
MCV RBC AUTO: 97.9 FL
MONOCYTES # BLD AUTO: 0.4 X10(3) UL (ref 0.1–1)
MONOCYTES NFR BLD AUTO: 8.5 %
NEUTROPHILS # BLD AUTO: 2.53 X10 (3) UL (ref 1.5–7.7)
NEUTROPHILS # BLD AUTO: 2.53 X10(3) UL (ref 1.5–7.7)
NEUTROPHILS NFR BLD AUTO: 54.1 %
OSMOLALITY SERPL CALC.SUM OF ELEC: 289 MOSM/KG (ref 275–295)
P AXIS: 54 DEGREES
P-R INTERVAL: 108 MS
PLATELET # BLD AUTO: 296 10(3)UL (ref 150–450)
POTASSIUM SERPL-SCNC: 4.2 MMOL/L (ref 3.5–5.1)
Q-T INTERVAL: 400 MS
QRS DURATION: 80 MS
QTC CALCULATION (BEZET): 461 MS
R AXIS: 55 DEGREES
RBC # BLD AUTO: 3.87 X10(6)UL
SODIUM SERPL-SCNC: 140 MMOL/L (ref 136–145)
T AXIS: 29 DEGREES
VENTRICULAR RATE: 80 BPM
WBC # BLD AUTO: 4.7 X10(3) UL (ref 4–11)

## 2023-12-04 PROCEDURE — 93005 ELECTROCARDIOGRAM TRACING: CPT

## 2023-12-04 PROCEDURE — 36415 COLL VENOUS BLD VENIPUNCTURE: CPT

## 2023-12-04 PROCEDURE — 80048 BASIC METABOLIC PNL TOTAL CA: CPT

## 2023-12-04 PROCEDURE — 85025 COMPLETE CBC W/AUTO DIFF WBC: CPT

## 2023-12-04 PROCEDURE — 93010 ELECTROCARDIOGRAM REPORT: CPT | Performed by: INTERNAL MEDICINE

## 2023-12-05 ENCOUNTER — OFFICE VISIT (OUTPATIENT)
Dept: PHYSICAL THERAPY | Age: 78
End: 2023-12-05
Attending: PHYSICIAN ASSISTANT
Payer: MEDICARE

## 2023-12-05 PROCEDURE — 97110 THERAPEUTIC EXERCISES: CPT

## 2023-12-05 NOTE — PROGRESS NOTES
Diagnosis:   Incomplete uterovaginal prolapse (N81.2)  Incomplete emptying of bladder (R33.9)  Pelvic muscle wasting (N81.84)      Referring Provider: Bj Castillo  Date of Evaluation:    11/14/23    Precautions:  None Next MD visit:   none scheduled  Date of Surgery: n/a   Insurance Primary/Secondary: MEDICARE / COMMERCIAL     # Auth Visits: 8            Subjective:   pt has been very compliant. No leakage. Improved nocturia, did not get up at night. Feeling like she is completely emptying after voiding. Pain: 0 /10      Objective:  2/5 LA with contractions  loss after 1-2 seconds       Assessment:  cues to not move her hips with the exercises and to not hold her breathe. Pt has made gains in her symptoms. Will be addressing the pessary remaining low next appt with the MD. It is helping overall. Progressed resistance today. Pt is feeling less pain in her low back   Able to use a thin liner at this time, no leaks. Goals:   Pt to be I with HEP   Pt to demo 10 second contractions on bio feedback at 10 mv to decrease incomplete emptying   Pt to demo 10 fast contractions in 10 secs on bio feedback for improved strength   Pt will not have to go back to void due to incomplete emptying   Pt to demo full PF contraction without the use of other muscles        Plan:  cont with PF and core strength - re assess next appt. Manual assessment   Date: 11/21/2023  TX#: 2/8 Date:   11/28/23               TX#: 3/8 Date:   12/5/23            TX#: 4/8 Date:                 TX#: 5/ Date:    Tx#: 6/   5 fast contractions  5 contractions  Add with ball 2 seconds with PF 10 x   10 ball add no PF   Band abd with PF 10 x   No PF with red band  Bridge 10 x   With PF contractions    Core:  PPT with marching 10 x   Bridge with marching 10 x    Re assess 10 mins B LA  Standing ball to shoulder with PF contractions 10 x   YTB lateral and front walking with PF contractions 10 x each  Add ball 10 x   Bridge 10 x with ball add 5 x  Bridge with marching 4 x 4 sets    Abd GTB 10 x   With PF contractions   Add bridge 5 x   Reformer 3 cords 10 x with PF contractions    10 no contractions   Step ups on 6 inch 10 x each side with PF contractions     YTB lateral side walking  Rocker board 10 x 2 HHA and CGA                                                 HEP:  see above     Charges: EX 3       Total Timed Treatment: 45 min  Total Treatment Time: 45 min

## 2023-12-11 ENCOUNTER — OFFICE VISIT (OUTPATIENT)
Dept: INTERNAL MEDICINE CLINIC | Facility: CLINIC | Age: 78
End: 2023-12-11
Payer: MEDICARE

## 2023-12-11 VITALS
HEART RATE: 87 BPM | OXYGEN SATURATION: 99 % | SYSTOLIC BLOOD PRESSURE: 116 MMHG | WEIGHT: 111 LBS | BODY MASS INDEX: 20.43 KG/M2 | HEIGHT: 62 IN | DIASTOLIC BLOOD PRESSURE: 68 MMHG

## 2023-12-11 DIAGNOSIS — L81.9 DISCOLORATION OF SKIN: ICD-10-CM

## 2023-12-11 DIAGNOSIS — E55.9 VITAMIN D DEFICIENCY: ICD-10-CM

## 2023-12-11 DIAGNOSIS — Z13.220 SCREENING, LIPID: ICD-10-CM

## 2023-12-11 DIAGNOSIS — I10 ESSENTIAL HYPERTENSION: ICD-10-CM

## 2023-12-11 DIAGNOSIS — E03.8 SUBCLINICAL HYPOTHYROIDISM: ICD-10-CM

## 2023-12-11 DIAGNOSIS — N81.4 UTEROVAGINAL PROLAPSE: Primary | ICD-10-CM

## 2023-12-11 PROCEDURE — 99214 OFFICE O/P EST MOD 30 MIN: CPT | Performed by: FAMILY MEDICINE

## 2023-12-11 RX ORDER — HYDROQUINONE 40 MG/G
1 CREAM TOPICAL NIGHTLY
Qty: 30 ML | Refills: 1 | Status: SHIPPED | OUTPATIENT
Start: 2023-12-11

## 2023-12-11 RX ORDER — OMEPRAZOLE 20 MG/1
20 CAPSULE, DELAYED RELEASE ORAL EVERY MORNING
Qty: 90 CAPSULE | Refills: 0 | Status: SHIPPED | OUTPATIENT
Start: 2023-12-11

## 2023-12-11 RX ORDER — MECLIZINE HYDROCHLORIDE 25 MG/1
25 TABLET ORAL 3 TIMES DAILY PRN
Qty: 30 TABLET | Refills: 0 | Status: SHIPPED | OUTPATIENT
Start: 2023-12-11

## 2023-12-11 RX ORDER — LISINOPRIL 10 MG/1
10 TABLET ORAL DAILY
Qty: 90 TABLET | Refills: 0 | Status: SHIPPED | OUTPATIENT
Start: 2023-12-11

## 2023-12-11 RX ORDER — IBUPROFEN 800 MG/1
TABLET ORAL
COMMUNITY
Start: 2023-12-05

## 2023-12-11 RX ORDER — ACETAMINOPHEN AND CODEINE PHOSPHATE 300; 30 MG/1; MG/1
TABLET ORAL
COMMUNITY
Start: 2023-12-05 | End: 2023-12-11

## 2023-12-11 RX ORDER — AMOXICILLIN 500 MG/1
TABLET, FILM COATED ORAL
COMMUNITY
Start: 2023-12-05 | End: 2023-12-11

## 2023-12-11 NOTE — TELEPHONE ENCOUNTER
Requested Prescriptions     Pending Prescriptions Disp Refills    OMEPRAZOLE 20 MG Oral Capsule Delayed Release [Pharmacy Med Name: Omeprazole Dr 20 Mg Cap Nort] 90 capsule 0     Sig: Take 1 capsule (20 mg total) by mouth every morning. MECLIZINE 25 MG Oral Tab [Pharmacy Med Name: Meclizine Hydrochloride 25 Mg Tab Zydu] 30 tablet 0     Sig: Take 1 tablet (25 mg total) by mouth 3 (three) times daily as needed.     LISINOPRIL 10 MG Oral Tab [Pharmacy Med Name: Lisinopril 10 Mg Tab Lupi] 90 tablet 0     Sig: TAKE ONE TABLET BY MOUTH ONCE DAILY       LOV:12/11/23    LAST CPE:2/13/23 AMS    Last Labs:12/4/23 bmp,cbc    Last Refill:  Omeprazole 9/7/23 (90 cap, 0 refills)  Meclizine 11/10/23 (30 tab, 0 refills)  Lisinopril 5/5/23 (90 tab, 1 refill)  Your appointments       Date & Time Appointment Department [facility])    Dec 12, 2023  9:15 AM CST Pelvic Health Treatment with Jaya Dick PT [facility] in [place] ([address] [place] [address])        Dec 14, 2023  9:00 AM CST Uro Follow Up Pre/Post Op with Sammie Trejo [facility] (--)        Dec 19, 2023  9:15 AM CST Pelvic Health Treatment with Jaya Dick PT [facility] in [place] ([address] [place] [address])              [facility] [address] Franco Barba [id number] [facility] [address] Uro[facility]necology  [address] [place] [address] [phone]

## 2023-12-12 ENCOUNTER — OFFICE VISIT (OUTPATIENT)
Dept: PHYSICAL THERAPY | Age: 78
End: 2023-12-12
Attending: PHYSICIAN ASSISTANT
Payer: MEDICARE

## 2023-12-12 PROCEDURE — 97110 THERAPEUTIC EXERCISES: CPT

## 2023-12-12 NOTE — PROGRESS NOTES
Subjective:   Patient ID: Mariama Banegas is a [de-identified]year old female. HPI Here for f/u. Patient has recently cancelled her planned hysterectomy because her pessary and pelvic floor therapy are helping. Would like cream for skin discoloration from rash she had and saw Derm for. Rash resolved but Derm told patient skin would remain discolored. History/Other:   Review of Systems   Constitutional:  Negative for chills and fever. Respiratory:  Negative for chest tightness and shortness of breath. Cardiovascular:  Negative for chest pain and palpitations. Psychiatric/Behavioral:  Negative for decreased concentration and sleep disturbance. The patient is not nervous/anxious. Current Outpatient Medications   Medication Sig Dispense Refill    Hydroquinone 4 % External Cream Apply 1 Application topically nightly. 30 mL 1    DESONIDE 0.05 % External Lotion Apply 1 Application topically 2 (two) times daily. 118 mL 0    raloxifene 60 MG Oral Tab Take 1 tablet (60 mg total) by mouth daily. 90 tablet 0    triamcinolone 0.1 % External Ointment Apply 1 Application topically 2 (two) times daily. 30 g 2    estradiol (ESTRACE) 0.1 MG/GM Vaginal Cream Apply 1/2  gram vaginally 2 times per week. 42.5 g 3    mupirocin 2 % External Ointment Apply 1 Application topically 3 (three) times daily. Apply 3 times daily for 10 days. Apply to scabbed area. 22 g 0    Multiple Vitamins-Minerals (CENTRUM) Oral Tab Take 1 tablet by mouth daily. omeprazole 20 MG Oral Capsule Delayed Release Take 1 capsule (20 mg total) by mouth every morning. 90 capsule 0    meclizine 25 MG Oral Tab Take 1 tablet (25 mg total) by mouth 3 (three) times daily as needed. 30 tablet 0    lisinopril 10 MG Oral Tab Take 1 tablet (10 mg total) by mouth daily. 90 tablet 0    ibuprofen 800 MG Oral Tab  (Patient not taking: Reported on 12/11/2023)       Allergies:   Allergies   Allergen Reactions    Bisphosphonates OTHER (SEE COMMENTS)     Sig bone pain Objective:   Physical Exam  Vitals reviewed. Constitutional:       Appearance: Normal appearance. She is well-developed. HENT:      Head: Normocephalic and atraumatic. Pulmonary:      Effort: Pulmonary effort is normal.   Neurological:      Mental Status: She is alert. Psychiatric:         Mood and Affect: Mood normal.         Behavior: Behavior normal.         Assessment & Plan:   1. Uterovaginal prolapse    2. Vitamin D deficiency    3. Subclinical hypothyroidism    4. Screening, lipid    5. Discoloration of skin    F/u UroGyne. Continue pelvic flood therapy.  2-4. Check labs. Schedule AWV in Feb.   5. Rx topical for short period of time. Orders Placed This Encounter   Procedures    Lipid Panel    TSH W Reflex To Free T4    Vitamin D       Meds This Visit:  Requested Prescriptions     Signed Prescriptions Disp Refills    Hydroquinone 4 % External Cream 30 mL 1     Sig: Apply 1 Application topically nightly.        Imaging & Referrals:  None

## 2023-12-12 NOTE — PROGRESS NOTES
Diagnosis:   Incomplete uterovaginal prolapse (N81.2)  Incomplete emptying of bladder (R33.9)  Pelvic muscle wasting (N81.84)      Referring Provider: Canelo Leonard  Date of Evaluation:    11/14/23    Precautions:  None Next MD visit:   none scheduled  Date of Surgery: n/a   Insurance Primary/Secondary: MEDICARE / COMMERCIAL     # Auth Visits: 8           Discharge Summary  Pt [facility] 5 [facility] [address] [employer] [facility] [facility] Subjective:   had a tooth pulled yesterday. Cancelled the hysterectomy, I am feeling so much better. Straining Friday to make a BM and the pessary came out. No leakage this past weekend without the pessary. Pain: 0 /10      Objective:  2/5 LA with contractions  loss after 1-2 seconds       Assessment:   educated on not straining with BM today,   Pt may get up once over night if she does have something to drink   Pt compliant with HEP  Will discuss with MD is pessary needs to be re inserted. Goals: - met  Pt to be I with HEP   Pt to demo 10 second contractions on bio feedback at 10 mv to decrease incomplete emptying - improved  Pt to demo 10 fast contractions in 10 secs on bio feedback for improved strength- improved  Pt will not have to go back to void due to incomplete emptying - met   Pt to demo full PF contraction without the use of other muscles - improved        Plan:  discharge with HEP   Date: 11/21/2023  TX#: 2/8 Date:   11/28/23               TX#: 3/8 Date:   12/5/23            TX#: 4/8 Date:   12/12/23          TX#: 5/8 Date:    Tx#: 6/   5 fast contractions  5 contractions  Add with ball 2 seconds with PF 10 x   10 ball add no PF   Band abd with PF 10 x   No PF with red band  Bridge 10 x   With PF contractions    Core:  PPT with marching 10 x   Bridge with marching 10 x    Re assess 10 mins B LA  Standing ball to shoulder with PF contractions 10 x   YTB lateral and front walking with PF contractions 10 x each  Add ball 10 x   Bridge 10 x with ball add 5 x  Bridge with marching 4 x 4 sets    Abd GTB 10 x   With PF contractions   Add bridge 5 x   Reformer 3 cords 10 x with PF contractions    10 no contractions   Step ups on 6 inch 10 x each side with PF contractions     YTB lateral side walking  Rocker board 10 x 2 HHA and CGA                        Add with ball 10 x   Add bridge 10 x   Red band 10 x abd with PF   Standing YTB - RTB   Front and side 10 x   Hip flex, abd, ext 10 x each no band                                   HEP:  see above     Charges: EX 3       Total Timed Treatment: 45 min  Total Treatment Time: 45 min

## 2023-12-14 ENCOUNTER — OFFICE VISIT (OUTPATIENT)
Dept: UROLOGY | Facility: CLINIC | Age: 78
End: 2023-12-14
Attending: OBSTETRICS & GYNECOLOGY
Payer: MEDICARE

## 2023-12-14 VITALS
HEIGHT: 62 IN | SYSTOLIC BLOOD PRESSURE: 120 MMHG | WEIGHT: 112 LBS | DIASTOLIC BLOOD PRESSURE: 70 MMHG | BODY MASS INDEX: 20.61 KG/M2

## 2023-12-14 DIAGNOSIS — N81.84 PELVIC MUSCLE WASTING: ICD-10-CM

## 2023-12-14 DIAGNOSIS — N81.2 INCOMPLETE UTEROVAGINAL PROLAPSE: Primary | ICD-10-CM

## 2023-12-14 DIAGNOSIS — R33.9 INCOMPLETE EMPTYING OF BLADDER: ICD-10-CM

## 2023-12-14 DIAGNOSIS — N95.2 POST-MENOPAUSAL ATROPHIC VAGINITIS: ICD-10-CM

## 2023-12-14 PROCEDURE — 51701 INSERT BLADDER CATHETER: CPT

## 2023-12-14 PROCEDURE — 57160 INSERT PESSARY/OTHER DEVICE: CPT

## 2023-12-14 PROCEDURE — 99212 OFFICE O/P EST SF 10 MIN: CPT

## 2023-12-14 NOTE — PROGRESS NOTES
Pt presents to follow up bulge  Previously fitted with #2 long stem gellhorn pessary, office care  Expelled last week while straining to have BM  Denies discharge  Denies bleeding  Denies s/sx of UTI  Bowels constipated, diet controlled  Pt is using vaginal estrogen cream twice weekly  Previously had #3 ring with support, more comfortable than gellhorn pessary  Completed PT on 12/12/23 with Jennifer Gray, discharged after 5 visits    Happy with pessary, feels supported  She is not currently interested in surgical management of her pelvic organ prolapse. Urodynamic testing on 9/5/23.  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 No demonstrable KAREN with cough or Valsalva at any volumes tested with or without prolapse reduced. Pressure/flow study: Voided 318 mL with PVR of 175 mL. Urogynecology Summary:  Urogynecology Summary  Prolapse: No  KAREN: No  Urge Incontinence: No  Nocturia Frequency: 1  Frequency: Greater than 3 hours (3-4 times)  Incomplete emptying: No  Constipation: No  Wears pad day?: 1  Wears Pad Night?: 1  Activities are limited by UI/POP?: No  Currently Sexually Active: No      Vitals:  Vitals:    12/14/23 0930   BP: 120/70       GENERAL EXAM:  GENERAL:  NAD  HEAD: NC/AT  EYES: symmetric bilaterally. No icterus. Sclera w/o injection  NECK: no masses  LUNGS:  Normal resp effort  ABDOMEN:  Soft, no mass  MUSK: normal gait, ROM wnl. No edema  PSYCH: A&Ox3. Recent and remote memory intact    PELVIC EXAM:  Ext. Gen: +atrophy, no lesions  Urethra: +atrophy, nontender,   Bladder:+fullness, nontender  Vagina: +atrophy, no lesions  Cervix: no bleeding, no lesions, nontender  Uterus: +mobile     PELVIC SUPPORT:  [place]: 3  Anterior: 3  Posterior: 3    #4 ring with support pessary placed, comfortable  Patient voided in the toilet 350cc  Sterile technique used to prep urethra and collect urine, PVR 60cc    Impression/Plan:    ICD-10-CM    1. Incomplete uterovaginal prolapse  N81.2       2. Incomplete emptying of bladder  R33.9       3. Pelvic muscle wasting  N81.84       4. Post-menopausal atrophic vaginitis  N95.2             Discussion Items:   Discussed mgmt of vulvovaginal atrophy with vaginal estrogen cream. Reviewed associated benefits, risks, alternatives, and goals. Recommend low dose twice weekly mgmt   Discussed management of pelvic organ prolapse including but not limited to behavioral modifications, conservative options, and surgical management. Discussed pessary management including benefits and risks. Discussed importance of keeping regularly scheduled pessary checks in prevention of complications related to pessary use. Continue pessary management, office care  Continue vag estrogen twice weekly  Daily pelvic exercises  Bowel management reviewed  Call with s/sx of UTI, problems with pessary   All questions answered  Pt understands and agrees to plan       Return in about 4 weeks (around 1/11/2024) for pessary care, sooner prn .       Rayna De La Garza PA-C

## 2023-12-19 ENCOUNTER — APPOINTMENT (OUTPATIENT)
Dept: PHYSICAL THERAPY | Age: 78
End: 2023-12-19
Attending: PHYSICIAN ASSISTANT
Payer: MEDICARE

## 2024-01-08 DIAGNOSIS — E03.8 SUBCLINICAL HYPOTHYROIDISM: ICD-10-CM

## 2024-01-08 DIAGNOSIS — Z00.00 ROUTINE GENERAL MEDICAL EXAMINATION AT A HEALTH CARE FACILITY: Primary | ICD-10-CM

## 2024-01-08 RX ORDER — MECLIZINE HYDROCHLORIDE 25 MG/1
25 TABLET ORAL 3 TIMES DAILY PRN
Qty: 30 TABLET | Refills: 0 | Status: SHIPPED | OUTPATIENT
Start: 2024-01-08

## 2024-01-08 RX ORDER — DESONIDE 0.5 MG/ML
LOTION TOPICAL
Qty: 118 ML | Refills: 0 | Status: SHIPPED | OUTPATIENT
Start: 2024-01-08

## 2024-01-08 NOTE — TELEPHONE ENCOUNTER
Requested Prescriptions     Pending Prescriptions Disp Refills    MECLIZINE 25 MG Oral Tab [Pharmacy Med Name: Meclizine Hydrochloride 25 Mg Tab Amne] 30 tablet 0     Sig: Take 1 tablet (25 mg total) by mouth 3 (three) times daily as needed.    DESONIDE 0.05 % External Lotion [Pharmacy Med Name: Desonide 0.05 % Lot Acta] 118 mL 0     Sig: Apply topically 2 (two) times daily.       LOV:12/11/23 AMS    LAST CPE:2/13/23 AMS ( due for annual, labs placed)    Last Labs:12/4/23 cbc & bmp    Last Refill:  Medication Quantity Refills Start End   DESONIDE 0.05 % External Lotion 118 mL 0 11/22/2023 --   Sig:   Apply 1 Application topically 2 (two) times daily.     Route:   Topical     Order #:   [id number]       Medication Quantity Refills Start End   meclizine 25 MG Oral Tab 30 tablet 0 12/11/2023 --   Sig:   Take 1 tablet (25 mg total) by mouth 3 (three) times daily as needed.     Route:   Oral     Order #:   [id number]         Your appointments       Date & Time Appointment Department (Center)    Jan 16, 2024  9:10 AM CST Uro Follow Up Pre/Post Op with Holly Manzano, PA-C [facility] [place] [facility] (--)        Feb 26, 2024 10:30 AM CST Established Patient with Oskar Lee MD [facility] ([facility])        Mar 11, 2024  9:30 AM CDT Medicare Annual Well Visit with Isabel Mauricio DO [facility], [address] [place] [address] ([facility] [place])              [facility], [address] [place] [address]  [facility] [place]  [address]  [facility] [address] [phone] [facility]  [facility]  [address]  [facility] [address] [phone] [facility] [place] [facility]  [address]  [facility] [id number] [phone]

## 2024-01-16 ENCOUNTER — OFFICE VISIT (OUTPATIENT)
Dept: UROLOGY | Facility: CLINIC | Age: 79
End: 2024-01-16
Attending: OBSTETRICS & GYNECOLOGY
Payer: MEDICARE

## 2024-01-16 VITALS
DIASTOLIC BLOOD PRESSURE: 65 MMHG | WEIGHT: 112 LBS | HEIGHT: 62 IN | BODY MASS INDEX: 20.61 KG/M2 | SYSTOLIC BLOOD PRESSURE: 110 MMHG

## 2024-01-16 DIAGNOSIS — R33.9 INCOMPLETE EMPTYING OF BLADDER: ICD-10-CM

## 2024-01-16 DIAGNOSIS — N95.2 POST-MENOPAUSAL ATROPHIC VAGINITIS: ICD-10-CM

## 2024-01-16 DIAGNOSIS — N81.84 PELVIC MUSCLE WASTING: ICD-10-CM

## 2024-01-16 DIAGNOSIS — N81.2 INCOMPLETE UTEROVAGINAL PROLAPSE: Primary | ICD-10-CM

## 2024-01-16 PROCEDURE — 99212 OFFICE O/P EST SF 10 MIN: CPT

## 2024-01-16 NOTE — PROGRESS NOTES
Pt presents to follow up bulge  Doing well with #4 ring with support pessary, office care  Reports pessary is comfortable   Denies discharge  Ron bleeding  Denies s/sx of UTI  Bowels regular  Pt is using vaginal estrogen cream twice weekly    Happy with pessary, feels supported  She is not currently interested in surgical management of her pelvic organ prolapse.    Urogynecology Summary:  Urogynecology Summary  Prolapse: No (b/c pessary)  KAREN: No  Urge Incontinence: No  Nocturia Frequency: 1  Frequency: Greater than 3 hours (every 3-4)  Incomplete emptying: No  Constipation: No  Wears pad day?: 1  Wears Pad Night?: 1  Activities are limited by UI/POP?: No  Currently Sexually Active: No      Vitals:  Vitals:    01/16/24 0922   BP: 110/65       GENERAL EXAM:  GENERAL:  NAD  HEAD: NC/AT  EYES: symmetric bilaterally. No icterus. Sclera w/o injection  NECK: no masses  LUNGS:  Normal resp effort  ABDOMEN:  Soft, no mass  MUSK: normal gait, ROM wnl. No edema  PSYCH: A&Ox3. Recent and remote memory intact    PELVIC EXAM:  Ext. Gen: +atrophy, no lesions  Urethra: +atrophy, nontender,   Bladder:+fullness, nontender  Vagina: +atrophy, no lesions  Cervix: no bleeding, no lesions, nontender  Uterus: +mobile     PELVIC SUPPORT:  [place]: 3  Anterior: 3  Posterior: 3    Her pessary was removed, cleaned, and reinserted w/o difficulty    Impression/Plan:    ICD-10-CM    1. Incomplete uterovaginal prolapse  N81.2       2. Incomplete emptying of bladder  R33.9       3. Pelvic muscle wasting  N81.84       4. Post-menopausal atrophic vaginitis  N95.2             Discussion Items:   Discussed mgmt of vulvovaginal atrophy with vaginal estrogen cream. Reviewed associated benefits, risks, alternatives, and goals. Recommend low dose twice weekly mgmt   Discussed management of pelvic organ prolapse including but not limited to behavioral modifications, conservative options, and surgical management.   Discussed pessary management including  benefits and risks. Discussed importance of keeping regularly scheduled pessary checks in prevention of complications related to pessary use.     Continue pessary management, office care - Not interested in home care  Continue vag estrogen twice weekly  Daily pelvic exercises  Bowel management reviewed  Call with s/sx of UTI, problems with pessary   All questions answered  Pt understands and agrees to plan       Return in about 6 weeks (around 2/27/2024) for pessary care, sooner prn .      FOREST CastleC

## 2024-02-02 RX ORDER — DESONIDE 0.5 MG/ML
1 LOTION TOPICAL 2 TIMES DAILY
Qty: 118 ML | Refills: 0 | Status: SHIPPED | OUTPATIENT
Start: 2024-02-02

## 2024-02-02 RX ORDER — MECLIZINE HYDROCHLORIDE 25 MG/1
25 TABLET ORAL 3 TIMES DAILY PRN
Qty: 30 TABLET | Refills: 0 | Status: SHIPPED | OUTPATIENT
Start: 2024-02-02

## 2024-02-02 RX ORDER — RALOXIFENE HYDROCHLORIDE 60 MG/1
60 TABLET, FILM COATED ORAL DAILY
Qty: 90 TABLET | Refills: 0 | Status: SHIPPED | OUTPATIENT
Start: 2024-02-02

## 2024-02-02 NOTE — TELEPHONE ENCOUNTER
Requested Prescriptions     Pending Prescriptions Disp Refills    RALOXIFENE 60 MG Oral Tab [Pharmacy Med Name: Raloxifene Hydrochloride 60 Mg Tab Nort] 90 tablet 0     Sig: Take 1 tablet (60 mg total) by mouth daily.    MECLIZINE 25 MG Oral Tab [Pharmacy Med Name: Meclizine Hydrochloride 25 Mg Tab Zydu] 30 tablet 0     Sig: Take 1 tablet (25 mg total) by mouth 3 (three) times daily as needed.    DESONIDE 0.05 % External Lotion [Pharmacy Med Name: Desonide 0.05 % Lot Acta] 118 mL 0     Sig: APPLY TOPICALLY TO THE AFFECTED AREAS TWICE DAILY       LOV:12/11/23 AMS    LAST CPE:2/13/23 AMS ( due for annual, labs active)    Last Labs:12/4/23 cbc & bmp    Last Refill:  Medication Quantity Refills Start End   DESONIDE 0.05 % External Lotion 118 mL 0 1/8/2024 --   Sig:   Apply topically 2 (two) times daily.     Route:   (none)     Order #:   [id number]       Medication Quantity Refills Start End   MECLIZINE 25 MG Oral Tab 30 tablet 0 1/8/2024 --   Sig:   Take 1 tablet (25 mg total) by mouth 3 (three) times daily as needed.     Route:   Oral     Order #:   [id number]       Medication Quantity Refills Start End   raloxifene 60 MG Oral Tab 90 tablet 0 11/10/2023 --   Sig:   Take 1 tablet (60 mg total) by mouth daily.     Route:   Oral     Order #:   [id number]         Your appointments       Date & Time Appointment Department (Center)    Feb 26, 2024 10:30 AM CST Established Patient with Oskar Lee MD [facility] ([facility])        Feb 29, 2024  8:40 AM CST Uro Follow Up Pre/Post Op with Holly Manzano PACynthiaC [facility] [place] [facility] (--)        Mar 11, 2024  9:30 AM CDT Medicare Annual Well Visit with Isabel Mauricio DO [facility], [address] [place] [address] ([facility] [place])              [facility], [address] [place] [address]  [facility]  [address] [place] [id number]  [phone]  [facility]  [facility]  [address]  [facility] [id number] [phone] [facility]  [facility] [facility] [address]  [facility] [address] [phone]

## 2024-02-29 ENCOUNTER — OFFICE VISIT (OUTPATIENT)
Dept: UROLOGY | Facility: CLINIC | Age: 79
End: 2024-02-29
Attending: OBSTETRICS & GYNECOLOGY
Payer: MEDICARE

## 2024-02-29 VITALS
HEIGHT: 62 IN | WEIGHT: 112 LBS | BODY MASS INDEX: 20.61 KG/M2 | SYSTOLIC BLOOD PRESSURE: 120 MMHG | DIASTOLIC BLOOD PRESSURE: 70 MMHG

## 2024-02-29 DIAGNOSIS — N81.84 PELVIC MUSCLE WASTING: ICD-10-CM

## 2024-02-29 DIAGNOSIS — N95.2 POST-MENOPAUSAL ATROPHIC VAGINITIS: ICD-10-CM

## 2024-02-29 DIAGNOSIS — R33.9 INCOMPLETE EMPTYING OF BLADDER: ICD-10-CM

## 2024-02-29 DIAGNOSIS — N81.2 INCOMPLETE UTEROVAGINAL PROLAPSE: Primary | ICD-10-CM

## 2024-02-29 PROCEDURE — 99212 OFFICE O/P EST SF 10 MIN: CPT

## 2024-02-29 NOTE — PROGRESS NOTES
Pt presents to follow up bulge  Doing well with #4 ring with support pessary, office care  Reports pessary is comfortable   Denies discharge  Denies bleeding  Denies s/sx of UTI  Bowels regular   Pt is using vaginal estrogen cream 2-3x weekly  Completed PT with Yue, void assessment wnl at visit on 12/14/23    Happy with pessary, feels supported  She is not currently interested in surgical management of her pelvic organ prolapse.    Urogynecology Summary:  Urogynecology Summary  Prolapse: No  KAREN: No  Urge Incontinence: No  Nocturia Frequency: 1  Frequency: 2 - 3 hours  Incomplete emptying: No  Constipation: No  Wears pad day?: 1 (light)  Wears Pad Night?: 1 (light)  Activities are limited by UI/POP?: No  Currently Sexually Active: No      Vitals:  Vitals:    02/29/24 0850   BP: 120/70       GENERAL EXAM:  GENERAL:  NAD  HEAD: NC/AT  EYES: symmetric bilaterally. No icterus. Sclera w/o injection  NECK: no masses  LUNGS:  Normal resp effort  ABDOMEN:  Soft, no mass  MUSK: normal gait, ROM wnl. No edema  PSYCH: A&Ox3. Recent and remote memory intact    PELVIC EXAM:  Ext. Gen: +atrophy, no lesions  Urethra: +atrophy, nontender,   Bladder:+fullness, nontender  Vagina: +atrophy, no lesions  Cervix: no bleeding, no lesions, nontender  Uterus: +mobile     PELVIC SUPPORT:  [place]: 3  Anterior: 3  Posterior: 3    Her pessary was removed, cleaned, and reinserted w/o difficulty    Impression/Plan:    ICD-10-CM    1. Incomplete uterovaginal prolapse  N81.2       2. Incomplete emptying of bladder  R33.9       3. Pelvic muscle wasting  N81.84       4. Post-menopausal atrophic vaginitis  N95.2             Discussion Items:   Discussed mgmt of vulvovaginal atrophy with vaginal estrogen cream. Reviewed associated benefits, risks, alternatives, and goals. Recommend low dose twice weekly mgmt   Discussed management of pelvic organ prolapse including but not limited to behavioral modifications, conservative options, and surgical  management.   Discussed pessary management including benefits and risks. Discussed importance of keeping regularly scheduled pessary checks in prevention of complications related to pessary use.     Continue pessary management, office care  Continue vag estrogen twice weekly  Daily pelvic exercises  Bowel management reviewed  Call with s/sx of UTI, problems with pessary   All questions answered  Pt understands and agrees to plan       Return in about 8 weeks (around 4/25/2024) for pessary care, sooner prn .      FOREST CastleC

## 2024-03-04 ENCOUNTER — LAB ENCOUNTER (OUTPATIENT)
Dept: LAB | Age: 79
End: 2024-03-04
Attending: FAMILY MEDICINE
Payer: MEDICARE

## 2024-03-04 DIAGNOSIS — E03.8 SUBCLINICAL HYPOTHYROIDISM: ICD-10-CM

## 2024-03-04 DIAGNOSIS — E55.9 VITAMIN D DEFICIENCY: ICD-10-CM

## 2024-03-04 DIAGNOSIS — Z13.220 SCREENING, LIPID: ICD-10-CM

## 2024-03-04 LAB
CHOLEST SERPL-MCNC: 156 MG/DL (ref ?–200)
FASTING PATIENT LIPID ANSWER: YES
HDLC SERPL-MCNC: 73 MG/DL (ref 40–59)
LDLC SERPL CALC-MCNC: 70 MG/DL (ref ?–100)
NONHDLC SERPL-MCNC: 83 MG/DL (ref ?–130)
T4 FREE SERPL-MCNC: 0.8 NG/DL (ref 0.8–1.7)
TRIGL SERPL-MCNC: 67 MG/DL (ref 30–149)
TSI SER-ACNC: 5.58 MIU/ML (ref 0.36–3.74)
VIT D+METAB SERPL-MCNC: 63.4 NG/ML (ref 30–100)
VLDLC SERPL CALC-MCNC: 10 MG/DL (ref 0–30)

## 2024-03-04 PROCEDURE — 80061 LIPID PANEL: CPT

## 2024-03-04 PROCEDURE — 84439 ASSAY OF FREE THYROXINE: CPT

## 2024-03-04 PROCEDURE — 84443 ASSAY THYROID STIM HORMONE: CPT

## 2024-03-04 PROCEDURE — 82306 VITAMIN D 25 HYDROXY: CPT

## 2024-03-04 PROCEDURE — 36415 COLL VENOUS BLD VENIPUNCTURE: CPT

## 2024-03-11 ENCOUNTER — OFFICE VISIT (OUTPATIENT)
Dept: INTERNAL MEDICINE CLINIC | Facility: CLINIC | Age: 79
End: 2024-03-11
Payer: MEDICARE

## 2024-03-11 VITALS
DIASTOLIC BLOOD PRESSURE: 68 MMHG | BODY MASS INDEX: 20.61 KG/M2 | WEIGHT: 112 LBS | HEART RATE: 85 BPM | HEIGHT: 62 IN | OXYGEN SATURATION: 98 % | SYSTOLIC BLOOD PRESSURE: 120 MMHG

## 2024-03-11 DIAGNOSIS — Z12.11 SCREENING FOR MALIGNANT NEOPLASM OF COLON: ICD-10-CM

## 2024-03-11 DIAGNOSIS — E46 PROTEIN-CALORIE MALNUTRITION, UNSPECIFIED SEVERITY (HCC): ICD-10-CM

## 2024-03-11 DIAGNOSIS — I10 ESSENTIAL HYPERTENSION: ICD-10-CM

## 2024-03-11 DIAGNOSIS — M05.79 RHEUMATOID ARTHRITIS INVOLVING MULTIPLE SITES WITH POSITIVE RHEUMATOID FACTOR (HCC): ICD-10-CM

## 2024-03-11 DIAGNOSIS — Z00.00 ENCOUNTER FOR ANNUAL HEALTH EXAMINATION: Primary | ICD-10-CM

## 2024-03-11 RX ORDER — OMEPRAZOLE 20 MG/1
20 CAPSULE, DELAYED RELEASE ORAL EVERY MORNING
Qty: 90 CAPSULE | Refills: 0 | Status: SHIPPED | OUTPATIENT
Start: 2024-03-11

## 2024-03-11 RX ORDER — DESONIDE 0.5 MG/ML
1 LOTION TOPICAL 2 TIMES DAILY
Qty: 118 ML | Refills: 0 | Status: SHIPPED | OUTPATIENT
Start: 2024-03-11

## 2024-03-11 NOTE — PATIENT INSTRUCTIONS
Ally Mei's SCREENING SCHEDULE   Tests on this list are recommended by your physician but may not be covered, or covered at this frequency, by your insurer.   Please check with your insurance carrier before scheduling to verify coverage.   PREVENTATIVE SERVICES FREQUENCY &  COVERAGE DETAILS LAST COMPLETION DATE   Diabetes Screening    Fasting Blood Sugar /  Glucose    One screening every 12 months if never tested or if previously tested but not diagnosed with pre-diabetes   One screening every 6 months if diagnosed with pre-diabetes Lab Results   Component Value Date    GLU 86 12/04/2023        Cardiovascular Disease Screening    Lipid Panel  Cholesterol  Lipoprotein (HDL)  Triglycerides Covered every 5 years for all Medicare beneficiaries without apparent signs or symptoms of cardiovascular disease Lab Results   Component Value Date    CHOLEST 156 03/04/2024    HDL 73 (H) 03/04/2024    LDL 70 03/04/2024    TRIG 67 03/04/2024         Electrocardiogram (EKG)   Covered if needed at Welcome to Medicare, and non-screening if indicated for medical reasons 12/04/2023      Ultrasound Screening for Abdominal Aortic Aneurysm (AAA) Covered once in a lifetime for one of the following risk factors   • Men who are 65-75 years old and have ever smoked   • Anyone with a family history -     Colorectal Cancer Screening  Covered for ages 50-85; only need ONE of the following:    Colonoscopy   Covered every 10 years    Covered every 2 years if patient is at high risk or previous colonoscopy was abnormal -    Health Maintenance   Topic Date Due   • Colorectal Cancer Screening  02/08/2019       Flexible Sigmoidoscopy   Covered every 4 years -    Fecal Occult Blood Test Covered annually -   Bone Density Screening    Bone density screening    Covered every 2 years after age 65 if diagnosed with risk of osteoporosis or estrogen deficiency.    Covered yearly for long-term glucocorticoid medication use (Steroids) Last Dexa  Scan:    XR DEXA BONE DENSITOMETRY (CPT=77080) 01/31/2022      No recommendations at this time   Pap and Pelvic    Pap   Covered every 2 years for women at normal risk; Annually if at high risk -  No recommendations at this time    Chlamydia Annually if high risk -  No recommendations at this time   Screening Mammogram    Mammogram     Recommend annually for all female patients aged 40 and older    One baseline mammogram covered for patients aged 35-39 -    No recommendations at this time    Immunizations    Influenza Covered once per flu season  Please get every year 09/18/2023  No recommendations at this time    Pneumococcal Each vaccine (Yyklhrt77 & Yqwmthtor80) covered once after 65 Prevnar 13: 06/29/2015    Cbwtzhply95: 06/13/2016     No recommendations at this time    Hepatitis B One screening covered for patients with certain risk factors   -  No recommendations at this time    Tetanus Toxoid Not covered by Medicare Part B unless medically necessary (cut with metal); may be covered with your pharmacy prescription benefits -    Tetanus, Diptheria and Pertusis TD and TDaP Not covered by Medicare Part B -  No recommendations at this time    Zoster Not covered by Medicare Part B; may be covered with your pharmacy  prescription benefits 10/31/2013  No recommendations at this time     Annual Monitoring of Persistent Medications (ACE/ARB, digoxin diuretics, anticonvulsants)    Potassium Annually Lab Results   Component Value Date    K 4.2 12/04/2023         Creatinine   Annually Lab Results   Component Value Date    CREATSERUM 0.75 12/04/2023         BUN Annually Lab Results   Component Value Date    BUN 11 12/04/2023       Drug Serum Conc Annually No results found for: \"DIGOXIN\", \"DIG\", \"VALP\"

## 2024-03-11 NOTE — TELEPHONE ENCOUNTER
Requested Prescriptions     Pending Prescriptions Disp Refills    OMEPRAZOLE 20 MG Oral Capsule Delayed Release [Pharmacy Med Name: Omeprazole Dr 20 Mg Cap Nort] 90 capsule 0     Sig: Take 1 capsule (20 mg total) by mouth every morning.    DESONIDE 0.05 % External Lotion [Pharmacy Med Name: Desonide 0.05 % Lot Acta] 118 mL 0     Sig: Apply 1 Application topically 2 (two) times daily.       LOV:3/11/24 AMS    LAST CPE:3/11/24 AMS    Last Labs:12/4/23 cbc,bmp,lipid,tsh    Last Refill:  Medication Quantity Refills Start End   Desonide 0.05 % External Lotion 118 mL 0 2/2/2024 --   Sig:   Apply 1 Application topically 2 (two) times daily.     Route:   Topical     Order #:   [id number]       Medication Quantity Refills Start End   omeprazole 20 MG Oral Capsule Delayed Release 90 capsule 0 12/11/2023 --   Sig:   Take 1 capsule (20 mg total) by mouth every morning.     Route:   Oral     Order #:   [id number]         Your appointments       Date & Time Appointment Department (Center)    Apr 25, 2024  9:00 AM CDT Uro Follow Up Pre/Post Op with Holly Manzano, PACynthiaC [facility] [place] [facility] (--)        Oct 14, 2024 10:15 AM CDT Established Patient with Oskar Lee MD [facility] ([facility])              [facility]  [facility]  [address]  [facility] [id number] [phone] [facility] [place] [facility]  [address]  [facility] [address] [phone]

## 2024-03-11 NOTE — PROGRESS NOTES
Subjective:   Ally Mei is a 79 year old female who presents for a Medicare Subsequent Annual Wellness visit (Pt already had Initial Annual Wellness) and scheduled follow up of multiple significant but stable problems.   1. Encounter for annual health examination (Primary)- doing well. Is physically active. Eating healthy.  2. Essential hypertension- taking BP med as prescribed with no issues.   3. Protein-calorie malnutrition, unspecified severity (HCC)- eats protein daily.  4. Rheumatoid arthritis involving multiple sites with positive rheumatoid factor (HCC)- no current symptoms.     History/Other:   Fall Risk Assessment:   She has been screened for Falls and is low risk.      Cognitive Assessment:   She had a completely normal cognitive assessment - see flowsheet entries     Functional Ability/Status:   Ally Mei has some abnormal functions as listed below:  She has Vision problems based on screening of functional status.       Depression Screening (PHQ-2/PHQ-9): PHQ-2 SCORE: 0  , done 3/11/2024             Advanced Directives:   She does have a Living Will but we do NOT have it on file in Epic.    She does have a POA but we do NOT have it on file in [employer].    Patient has Advance Care Planning documents but we do not have a copy in EMR. Discussed Advanced Care Planning with patient and instructed patient to get our office a copy to be scanned into EMR.      Patient Active Problem List   Diagnosis    Osteoporosis    Vitamin D deficiency    Gastroesophageal reflux disease without esophagitis    Osteoarthritis of both hands, unspecified osteoarthritis type    Rheumatoid arthritis involving multiple sites with positive rheumatoid factor (HCC)    Protein-calorie malnutrition, unspecified severity (HCC)    Incomplete uterovaginal prolapse    Cystocele, midline    Rectocele    Stress incontinence    Vaginal atrophy    Porokeratosis    Detrusor instability    Incomplete emptying of bladder      Allergies:  She is allergic to bisphosphonates.    Current Medications:  Outpatient Medications Marked as Taking for the 3/11/24 encounter (Office Visit) with Isabel Mauricio DO   Medication Sig    raloxifene 60 MG Oral Tab Take 1 tablet (60 mg total) by mouth daily.    meclizine 25 MG Oral Tab Take 1 tablet (25 mg total) by mouth 3 (three) times daily as needed.    Desonide 0.05 % External Lotion Apply 1 Application topically 2 (two) times daily.    omeprazole 20 MG Oral Capsule Delayed Release Take 1 capsule (20 mg total) by mouth every morning.    lisinopril 10 MG Oral Tab Take 1 tablet (10 mg total) by mouth daily.    Multiple Vitamins-Minerals (CENTRUM) Oral Tab Take 1 tablet by mouth daily.       Medical History:  She  has a past medical history of Arthritis (), Esophageal reflux (), Osteoarthritis, Osteoporosis, Skin disorder, Unspecified essential hypertension, and Vertigo.  Surgical History:  She  has a past surgical history that includes removal of ovarian cyst(s); colonoscopy; tonsillectomy;  (,.,,.); and other surgical history ().   Family History:  Her family history includes Acid reflux in an other family member; Diabetes in her father; Diabetes, type 2 in her father; Heart Disease in her mother; Heart Disorder in her mother; Lung cancer in her mother.  Social History:  She  reports that she has never smoked. She has never used smokeless tobacco. She reports current alcohol use of about 7.0 standard drinks of alcohol per week. She reports that she does not use drugs.    Tobacco:  She has never smoked tobacco.    CAGE Alcohol Screen:   CAGE screening score of 0 on 3/4/2024, showing low risk of alcohol abuse.      Patient Care Team:  Isabel Mauricio DO as PCP - General  Pepe Hung MD as Consulting Physician (NEUROLOGY)  Julissa Gramajo PT as Physical Therapist  Yue Charles PT as Physical Therapist    Review of Systems  GENERAL: feels well  otherwise  SKIN: denies any unusual skin lesions  EYES: denies blurred vision or double vision  HEENT: denies nasal congestion, sinus pain or ST  LUNGS: denies shortness of breath with exertion  CARDIOVASCULAR: denies chest pain on exertion  GI: denies abdominal pain, denies heartburn  : denies dysuria, vaginal discharge or itching, no complaint of urinary incontinence   MUSCULOSKELETAL: denies back pain  NEURO: denies headaches  PSYCHE: denies depression or anxiety  HEMATOLOGIC: denies hx of anemia  ENDOCRINE: denies thyroid history  ALL/ASTHMA: denies hx of allergy or asthma    Objective:   Physical Exam  General Appearance:  Alert, cooperative, no distress, appears stated age   Head:  Normocephalic, without obvious abnormality, atraumatic   Eyes:  PERRL, conjunctiva/corneas clear, EOM's intact both eyes   Ears:  Normal TM's and external ear canals, both ears   Nose: Nares normal, septum midline,mucosa normal, no drainage or sinus tenderness   Throat: Lips, mucosa, and tongue normal; teeth and gums normal   Neck: Supple, symmetrical, trachea midline, no adenopathy;  thyroid: not enlarged, symmetric, no tenderness/mass/nodules; no carotid bruit or JVD   Back:   Symmetric, no curvature, ROM normal, no CVA tenderness   Lungs:   Clear to auscultation bilaterally, respirations unlabored   Heart:  Regular rate and rhythm, S1 and S2 normal, no murmur, rub, or gallop       Pelvic: Deferred   Extremities: Extremities normal, atraumatic, no cyanosis or edema   Pulses: 2+ and symmetric   Skin: Skin color, texture, turgor normal, no rashes or lesions   Lymph nodes: Cervical, supraclavicular, and axillary nodes normal   Neurologic: Normal       /68   Pulse 85   Ht 5' 2\" (1.575 m)   Wt 112 lb (50.8 kg)   SpO2 98%   BMI 20.49 kg/m²  Estimated body mass index is 20.49 kg/m² as calculated from the following:    Height as of this encounter: 5' 2\" (1.575 m).    Weight as of this encounter: 112 lb (50.8 kg).    Medicare  Hearing Assessment:   Hearing Screening    Screening Method: Whisper Test  Whisper Test Result: Pass                   Assessment & Plan:   Ally Mei is a 79 year old female who presents for a Medicare Assessment.     1. Encounter for annual health examination (Primary)  2. Essential hypertension  3. Protein-calorie malnutrition, unspecified severity (HCC)  4. Rheumatoid arthritis involving multiple sites with positive rheumatoid factor (HCC)  5. Screening for malignant neoplasm of colon  The patient indicates understanding of these issues and agrees to the plan.  Reinforced healthy diet, lifestyle, and exercise.  1. Encounter for annual health examination (Primary)- Reviewed age-appropriate preventive health and safety recommendations with patient. Reviewed lab results. Encouraged regular exercise and healthy eating.   2. Essential hypertension- BP controlled on med. Continue.  3. Protein-calorie malnutrition, unspecified severity (HCC)- stable weight.   4. Rheumatoid arthritis involving multiple sites with positive rheumatoid factor (HCC)- stable. F/u Rheum.  5. Screen for malignant neoplasm colon- discussed options at-length. Patient declines colon cancer screening.     Return in 6 months (on 9/11/2024).     Isabel Mauricio DO, 3/11/2024     Supplementary Documentation:   General Health:  In the past six months, have you lost more than 10 pounds without trying?: 2 - No  Has your appetite been poor?: No  Type of Diet: Balanced  How does the patient maintain a good energy level?: Appropriate Exercise  How would you describe your daily physical activity?: Moderate  How would you describe your current health state?: Good  How do you maintain positive mental well-being?: Social Interaction;Visiting Friends;Visiting Family  On a scale of 0 to 10, with 0 being no pain and 10 being severe pain, what is your pain level?: 2 - (Mild)  At any time do you feel concerned for the safety/well-being of yourself and/or  your children, in your home or elsewhere?: No  Have you had any immunizations at another office such as Influenza, Hepatitis B, Tetanus, or Pneumococcal?: Yes         Ally Mei's SCREENING SCHEDULE   Tests on this list are recommended by your physician but may not be covered, or covered at this frequency, by your insurer.   Please check with your insurance carrier before scheduling to verify coverage.   PREVENTATIVE SERVICES FREQUENCY &  COVERAGE DETAILS LAST COMPLETION DATE   Diabetes Screening    Fasting Blood Sugar /  Glucose    One screening every 12 months if never tested or if previously tested but not diagnosed with pre-diabetes   One screening every 6 months if diagnosed with pre-diabetes Lab Results   Component Value Date    GLU 86 12/04/2023        Cardiovascular Disease Screening    Lipid Panel  Cholesterol  Lipoprotein (HDL)  Triglycerides Covered every 5 years for all Medicare beneficiaries without apparent signs or symptoms of cardiovascular disease Lab Results   Component Value Date    CHOLEST 156 03/04/2024    HDL 73 (H) 03/04/2024    LDL 70 03/04/2024    TRIG 67 03/04/2024         Electrocardiogram (EKG)   Covered if needed at Welcome to Medicare, and non-screening if indicated for medical reasons 12/04/2023      Ultrasound Screening for Abdominal Aortic Aneurysm (AAA) Covered once in a lifetime for one of the following risk factors    Men who are 65-75 years old and have ever smoked    Anyone with a family history -     Colorectal Cancer Screening  Covered for ages 50-85; only need ONE of the following:    Colonoscopy   Covered every 10 years    Covered every 2 years if patient is at high risk or previous colonoscopy was abnormal -    Health Maintenance   Topic Date Due    Colorectal Cancer Screening  02/08/2019       Flexible Sigmoidoscopy   Covered every 4 years -    Fecal Occult Blood Test Covered annually -   Bone Density Screening    Bone density screening    Covered every 2 years  after age 65 if diagnosed with risk of osteoporosis or estrogen deficiency.    Covered yearly for long-term glucocorticoid medication use (Steroids) Last Dexa Scan:    XR DEXA BONE DENSITOMETRY (CPT=77080) 01/31/2022      No recommendations at this time   Pap and Pelvic    Pap   Covered every 2 years for women at normal risk; Annually if at high risk -  No recommendations at this time    Chlamydia Annually if high risk -  No recommendations at this time   Screening Mammogram    Mammogram     Recommend annually for all female patients aged 40 and older    One baseline mammogram covered for patients aged 35-39 -    No recommendations at this time    Immunizations    Influenza Covered once per flu season  Please get every year 09/18/2023  No recommendations at this time    Pneumococcal Each vaccine ([id number] & Ljhwabiby85) covered once after 65 Prevnar 13: 06/29/2015    Sncnabroj66: 06/13/2016     No recommendations at this time    Hepatitis B One screening covered for patients with certain risk factors   -  No recommendations at this time    Tetanus Toxoid Not covered by Medicare Part B unless medically necessary (cut with metal); may be covered with your pharmacy prescription benefits -    Tetanus, Diptheria and Pertusis TD and TDaP Not covered by Medicare Part B -  No recommendations at this time    Zoster Not covered by Medicare Part B; may be covered with your pharmacy  prescription benefits 10/31/2013  No recommendations at this time     Annual Monitoring of Persistent Medications (ACE/ARB, digoxin diuretics, anticonvulsants)    Potassium Annually Lab Results   Component Value Date    K 4.2 12/04/2023         Creatinine   Annually Lab Results   Component Value Date    CREATSERUM 0.75 12/04/2023         BUN Annually Lab Results   Component Value Date    BUN 11 12/04/2023       Drug Serum Conc Annually No results found for: \"DIGOXIN\", \"DIG\", \"VALP\"

## 2024-04-06 DIAGNOSIS — I10 ESSENTIAL HYPERTENSION: ICD-10-CM

## 2024-04-08 RX ORDER — LISINOPRIL 10 MG/1
10 TABLET ORAL DAILY
Qty: 90 TABLET | Refills: 1 | Status: SHIPPED | OUTPATIENT
Start: 2024-04-08

## 2024-04-08 RX ORDER — MECLIZINE HYDROCHLORIDE 25 MG/1
25 TABLET ORAL 3 TIMES DAILY PRN
Qty: 30 TABLET | Refills: 1 | Status: SHIPPED | OUTPATIENT
Start: 2024-04-08

## 2024-04-08 NOTE — TELEPHONE ENCOUNTER
Pt walked into office requesting refill of medications. Pt stated she is almost out of both.     [id number] - [place], [place] - [address] [phone], [phone]   [address] [facility] [address]   Phone: [phone] Fax: [phone]      Disp Refills Start End    lisinopril 10 MG Oral Tab 90 tablet 0 12/11/2023 --    Sig - Route: Take 1 tablet (10 mg total) by mouth daily. - Oral    Sent to pharmacy as: Lisinopril 10 MG Oral Tablet (Zestril)    E-Prescribing Status: Receipt confirmed by pharmacy (12/11/2023  1:32 PM CST)      Medication Quantity Refills Start End   meclizine 25 MG Oral Tab 30 tablet 0 2/2/2024 --   Sig:   Take 1 tablet (25 mg total) by mouth 3 (three) times daily as needed.     Route:   Oral

## 2024-04-08 NOTE — TELEPHONE ENCOUNTER
Requested Prescriptions     Pending Prescriptions Disp Refills    MECLIZINE 25 MG Oral Tab [Pharmacy Med Name: Meclizine Hydrochloride 25 Mg Tab Zydu] 30 tablet 0     Sig: Take 1 tablet (25 mg total) by mouth 3 (three) times daily as needed.    LISINOPRIL 10 MG Oral Tab [Pharmacy Med Name: Lisinopril 10 Mg Tab Solc] 90 tablet 0     Sig: Take 1 tablet (10 mg total) by mouth daily.       LOV:3/11/24 AMS    LAST CPE:3/11/24 AMS    Last Labs:3/4/24 TSH,LIPID,BMP,CBC    Last Refill:  Medication Quantity Refills Start End   lisinopril 10 MG Oral Tab 90 tablet 0 12/11/2023 --   Sig:   Take 1 tablet (10 mg total) by mouth daily.     Route:   Oral     Order #:   [id number]       Medication Quantity Refills Start End   meclizine 25 MG Oral Tab 30 tablet 0 2/2/2024 --   Sig:   Take 1 tablet (25 mg total) by mouth 3 (three) times daily as needed.     Route:   Oral     Order #:   [id number]         Your appointments       Date & Time Appointment Department (Center)    Apr 25, 2024  9:00 AM CDT Uro Follow Up Pre/Post Op with Holly Manzano, PA-C [facility] (--)        Oct 14, 2024 10:15 AM CDT Established Patient with Oskar Lee MD [facility] ([facility])              [facility]  [address]  [facility] [id number] [phone] [facility]  [address]  [facility] [id number] [phone]

## 2024-04-25 ENCOUNTER — OFFICE VISIT (OUTPATIENT)
Dept: UROLOGY | Facility: CLINIC | Age: 79
End: 2024-04-25
Attending: OBSTETRICS & GYNECOLOGY
Payer: MEDICARE

## 2024-04-25 VITALS — BODY MASS INDEX: 20 KG/M2 | HEIGHT: 62 IN | RESPIRATION RATE: 18 BRPM | TEMPERATURE: 97 F

## 2024-04-25 DIAGNOSIS — R33.9 INCOMPLETE EMPTYING OF BLADDER: ICD-10-CM

## 2024-04-25 DIAGNOSIS — N81.2 INCOMPLETE UTEROVAGINAL PROLAPSE: Primary | ICD-10-CM

## 2024-04-25 DIAGNOSIS — N81.84 PELVIC MUSCLE WASTING: ICD-10-CM

## 2024-04-25 DIAGNOSIS — N95.2 POST-MENOPAUSAL ATROPHIC VAGINITIS: ICD-10-CM

## 2024-04-25 PROCEDURE — 99212 OFFICE O/P EST SF 10 MIN: CPT

## 2024-04-25 RX ORDER — ESTRADIOL 0.1 MG/G
CREAM VAGINAL
COMMUNITY

## 2024-04-25 NOTE — PROGRESS NOTES
Pt presents to follow up bulge  Doing well with #4 ring with support pessary, office care  Reports pessary is comfortable   Denies discharge  Denies bleeding  Denies s/sx of UTI  Bowels regular   Pt is using vaginal estrogen cream twice weekly  Completed PT with Yue, void assessment wnl at visit on 12/14/23     Happy with pessary, feels supported  She is not currently interested in surgical management of her pelvic organ prolapse.    Urogynecology Summary:  Urogynecology Summary  Prolapse: No  KAREN: No  Urge Incontinence: No  Nocturia Frequency: 1  Frequency: 2 - 3 hours  Incomplete emptying: No  Constipation: No  Wears pad day?: 1  Wears Pad Night?: 1  Activities are limited by UI/POP?: No  Currently Sexually Active: No      Vitals:  Vitals:    04/25/24 0917   Resp: 18   Temp: 97.3 °F (36.3 °C)       GENERAL EXAM:  GENERAL:  NAD  HEAD: NC/AT  EYES: symmetric bilaterally. No icterus. Sclera w/o injection  NECK: no masses  LUNGS:  Normal resp effort  ABDOMEN:  Soft, no mass  MUSK: normal gait, ROM wnl. No edema  PSYCH: A&Ox3. Recent and remote memory intact    PELVIC EXAM:  Ext. Gen: +atrophy, no lesions  Urethra: +atrophy, nontender,   Bladder:+fullness, nontender  Vagina: +atrophy, no lesions  Cervix: no bleeding, no lesions, nontender  Uterus: +mobile     PELVIC SUPPORT:  Iva: 3  Anterior: 3  Posterior: 3    Her pessary was removed, cleaned, and reinserted w/o difficulty    Impression/Plan:    ICD-10-CM    1. Incomplete uterovaginal prolapse  N81.2       2. Incomplete emptying of bladder  R33.9       3. Pelvic muscle wasting  N81.84       4. Post-menopausal atrophic vaginitis  N95.2             Discussion Items:   Discussed mgmt of vulvovaginal atrophy with vaginal estrogen cream. Reviewed associated benefits, risks, alternatives, and goals. Recommend low dose twice weekly mgmt   Discussed management of pelvic organ prolapse including but not limited to behavioral modifications, conservative options, and  surgical management.   Discussed pessary management including benefits and risks. Discussed importance of keeping regularly scheduled pessary checks in prevention of complications related to pessary use.     Continue pessary management, office care  Continue vag estrogen twice weekly  Daily pelvic exercises  Bowel management reviewed  Call with s/sx of UTI, problems with pessary   All questions answered  Pt understands and agrees to plan       Return in about 10 weeks (around 7/4/2024) for pessary care, sooner prn .      [facility]

## 2024-05-08 ENCOUNTER — OFFICE VISIT (OUTPATIENT)
Dept: FAMILY MEDICINE CLINIC | Facility: CLINIC | Age: 79
End: 2024-05-08
Payer: MEDICARE

## 2024-05-08 VITALS
BODY MASS INDEX: 19.83 KG/M2 | OXYGEN SATURATION: 97 % | DIASTOLIC BLOOD PRESSURE: 90 MMHG | TEMPERATURE: 98 F | WEIGHT: 105 LBS | HEART RATE: 84 BPM | HEIGHT: 61 IN | SYSTOLIC BLOOD PRESSURE: 134 MMHG | RESPIRATION RATE: 18 BRPM

## 2024-05-08 DIAGNOSIS — T14.8XXA WOUND OF SKIN: Primary | ICD-10-CM

## 2024-05-08 PROCEDURE — 99213 OFFICE O/P EST LOW 20 MIN: CPT | Performed by: PHYSICIAN ASSISTANT

## 2024-05-08 RX ORDER — CEPHALEXIN 500 MG/1
500 CAPSULE ORAL 3 TIMES DAILY
Qty: 21 CAPSULE | Refills: 0 | Status: SHIPPED | OUTPATIENT
Start: 2024-05-08 | End: 2024-05-15

## 2024-05-08 NOTE — PATIENT INSTRUCTIONS
Wash affected area 3 times daily with warm soapy water.  Pat dry, do not rub.  Do not share or re-use wound care towels.  Watch area closely for any increased redness, swelling, red streaking, worsening fever (over 101) or pus formation-if any noted follow up with your primary care doctor or seek acute care immediately.  If prescribed, make sure to complete entire course of antibiotic therapy to reduce risk of antibiotic resistance.

## 2024-05-08 NOTE — PROGRESS NOTES
CHIEF COMPLAINT:     Chief Complaint   Patient presents with    Other     Pt bump L forearm  on the wall on Sunday. ( Redness around the wound )          HPI:     Ally Mei is a 79 year old female who presents with concerns of wound. Patient first noticed symptoms 2 days ago. Lost power and bumped arm into a well, had a wound that bled but has since stopped bleeding. Had some pain overnight which caused her to come in to have it checked out. No pain currently. States that it is improving.  Reports erythema, no  warmth, no tenderness to palpation, and no drainage from area.  Symptoms have been improving since onset.  Affected location includes: L forearm.     Precipitating event: injury.  Treatments: cleansed area.  No other associated symptoms.    Denies fever, streaking of wound, or other signs of systemic illness.     Current Outpatient Medications   Medication Sig Dispense Refill    mupirocin 2 % External Ointment Apply 1 Application topically 3 (three) times daily for 7 days. 15 g 0    cephalexin 500 MG Oral Cap Take 1 capsule (500 mg total) by mouth 3 (three) times daily for 7 days. 21 capsule 0    estradiol 0.1 MG/GM Vaginal Cream Place vaginally twice a week.      meclizine 25 MG Oral Tab Take 1 tablet (25 mg total) by mouth 3 (three) times daily as needed. 30 tablet 1    lisinopril 10 MG Oral Tab Take 1 tablet (10 mg total) by mouth daily. 90 tablet 1    omeprazole 20 MG Oral Capsule Delayed Release Take 1 capsule (20 mg total) by mouth every morning. 90 capsule 0    Desonide 0.05 % External Lotion Apply 1 Application topically 2 (two) times daily. 118 mL 0    raloxifene 60 MG Oral Tab Take 1 tablet (60 mg total) by mouth daily. 90 tablet 0    triamcinolone 0.1 % External Ointment Apply 1 Application topically 2 (two) times daily. (Patient not taking: Reported on 3/11/2024) 30 g 2    Multiple Vitamins-Minerals (CENTRUM) Oral Tab Take 1 tablet by mouth daily.        Past Medical History:     Arthritis    Esophageal reflux    Osteoarthritis    Osteoporosis    Skin disorder    Unspecified essential hypertension    Vertigo      Social History:  Social History     Socioeconomic History    Marital status:    Tobacco Use    Smoking status: Never    Smokeless tobacco: Never   Vaping Use    Vaping status: Never Used   Substance and Sexual Activity    Alcohol use: Yes     Alcohol/week: 7.0 standard drinks of alcohol     Types: 7 Cans of beer per week     Comment: beer at bedtime    Drug use: No   Other Topics Concern    Caffeine Concern Yes    Stress Concern No    Weight Concern No    Special Diet No    Exercise Yes    Seat Belt Yes        REVIEW OF SYSTEMS:   GENERAL: feels well otherwise, no fever, no chills, normal appetite  SKIN: as above.  CHEST: no chest pains, no palpitations.  LUNGS: No wheezing, no cough.  LYMPH: no enlargement of the lymph nodes.  MUSC/SKEL: no joint swelling, no joint stiffness.  NEURO: no abnormal sensation, no tingling of the skin or numbness.    EXAM:   /90 (BP Location: Left arm)   Pulse 84   Temp 97.8 °F (36.6 °C) (Temporal)   Resp 18   Ht 5' 1\" (1.549 m)   Wt 105 lb (47.6 kg)   SpO2 97%   BMI 19.84 kg/m²   GENERAL: well developed, well nourished,in no apparent distress  SKIN: Lesion(s):  left forearm with 1 erythematous circular scabbed area (1 cm x 1 cm) and small linear wound with surrounding erythema (2cm x 1cm) no swelling that is healing,  + erythema, no tenderness, no warmth, no fluctuance, no drainage,     HEAD: atraumatic, normocephalic  EYES: conjunctiva clear  NOSE: Normal external nose.  No rhinorrhea.  NECK: supple, non-tender  LUNGS: clear to auscultation bilaterally, no wheezes or rhonchi. Breathing is non labored.  CARDIO: RRR without murmur  EXTREMITIES: no cyanosis or edema.  Cap refill brisk- less than 2 seconds.   LYMPH: no lymphadenopathy.      ASSESSMENT AND PLAN:     ASSESSMENT:   Encounter Diagnosis   Name Primary?    Wound of skin Yes        PLAN: Appears to be healing. Not painful. Will treat with topical mupirocin. Skin care discussed with patient. Instructions and Comfort Care as listed in Patient Instructions.  Medication as below. Printed out oral abx script, discussed s/s of when indicated to start but to hold off for now and only use topical.  Risks, benefits, and side effects of medication explained and discussed.   The patient is asked to f/u with PCP if no improvement in 3 days or sooner for new or worsening sx.  The patient indicates understanding of these issues and agrees to the plan.  Requested Prescriptions     Signed Prescriptions Disp Refills    mupirocin 2 % External Ointment 15 g 0     Sig: Apply 1 Application topically 3 (three) times daily for 7 days.    cephalexin 500 MG Oral Cap 21 capsule 0     Sig: Take 1 capsule (500 mg total) by mouth 3 (three) times daily for 7 days.         Patient Instructions   Wash affected area 3 times daily with warm soapy water.  Pat dry, do not rub.  Do not share or re-use wound care towels.  Watch area closely for any increased redness, swelling, red streaking, worsening fever (over 101) or pus formation-if any noted follow up with your primary care doctor or seek acute care immediately.  If prescribed, make sure to complete entire course of antibiotic therapy to reduce risk of antibiotic resistance.

## 2024-05-21 RX ORDER — MECLIZINE HYDROCHLORIDE 25 MG/1
25 TABLET ORAL 3 TIMES DAILY PRN
Qty: 30 TABLET | Refills: 1 | Status: SHIPPED | OUTPATIENT
Start: 2024-05-21

## 2024-05-21 NOTE — TELEPHONE ENCOUNTER
Refill passed per [facility] protocols.    Requested Prescriptions   Pending Prescriptions Disp Refills    MECLIZINE 25 MG Oral Tab [Pharmacy Med Name: Meclizine Hydrochloride 25 Mg Tab Zydu] 30 tablet 0     Sig: TAKE ONE TABLET BY MOUTH THREE TIMES DAILY AS NEEDED       Gastrointestional Medication Protocol Passed - 5/18/2024  1:16 PM        Passed - In person appointment or virtual visit in the past 12 mos or appointment in next 3 mos     Recent Outpatient Visits              5 days ago Abrasion    [facility] Scott, MD    Office Visit    1 week ago Wound of skin    [facility], [address] [place] [address] Jolanta Yañez PA-C    Office Visit    3 weeks ago Incomplete uterovaginal prolapse    [facility] Holly Manzano PA-C    Office Visit    2 months ago Encounter for annual health examination    [facility], [address] [place] [address] Isabel Mauricio DO    Office Visit    2 months ago Incomplete uterovaginal prolapse    [facility] Holly Manzano PA-C    Office Visit          Future Appointments         Provider Department Appt Notes    In 1 month Holly Manzano PA-C [facility] 10 WEEK PESSARY CHECK    In 3 months Isabel Mauricio DO [facility], [address] [place] [address] 6 month f/up    In 4 months Oskar Lee MD [facility]

## 2024-06-10 RX ORDER — OMEPRAZOLE 20 MG/1
20 CAPSULE, DELAYED RELEASE ORAL EVERY MORNING
Qty: 90 CAPSULE | Refills: 0 | Status: SHIPPED | OUTPATIENT
Start: 2024-06-10

## 2024-06-10 RX ORDER — MECLIZINE HYDROCHLORIDE 25 MG/1
25 TABLET ORAL 3 TIMES DAILY PRN
Qty: 30 TABLET | Refills: 0 | Status: SHIPPED | OUTPATIENT
Start: 2024-06-10

## 2024-06-10 RX ORDER — DESONIDE 0.5 MG/ML
1 LOTION TOPICAL 2 TIMES DAILY
Qty: 118 ML | Refills: 0 | Status: SHIPPED | OUTPATIENT
Start: 2024-06-10

## 2024-06-30 NOTE — TELEPHONE ENCOUNTER
Please review. Protocol Failed; No Protocol    Requested Prescriptions   Pending Prescriptions Disp Refills    DESONIDE 0.05 % External Lotion [Pharmacy Med Name: Desonide 0.05 % Lot Acta] 118 mL 0     Sig: APPLY TO AFFECTED AREA(s) TOPICALLY 2 TIMES DAILY       There is no refill protocol information for this order            Future Appointments         Provider Department Appt Notes    In 2 days Holly Manzano PA-C [facility] [place] [facility] 10 WEEK PESSARY CHECK    In 2 months Isabel Mauricio DO [facility], [address] [place] [address] 6 month f/up    In 3 months Oskar Lee MD [facility]           Recent Outpatient Visits              1 month ago Abrasion    [facility] Scott, MD    Office Visit    1 month ago Wound of skin    [facility], [facility]In Clinic, [address] [place] [address] Jolanta Yañez PA-C    Office Visit    2 months ago Incomplete uterovaginal prolapse    [facility] [place] [facility] Holly Manzano PA-C    Office Visit    3 months ago Encounter for annual health examination    [facility], [address] [place] [address] Isabel Mauricio DO    Office Visit    4 months ago Incomplete uterovaginal prolapse    [facility] [place] [facility] Holly Manzano PA-C    Office Visit

## 2024-07-01 RX ORDER — DESONIDE 0.5 MG/ML
1 LOTION TOPICAL 2 TIMES DAILY
Qty: 118 ML | Refills: 1 | Status: SHIPPED | OUTPATIENT
Start: 2024-07-01

## 2024-07-02 ENCOUNTER — OFFICE VISIT (OUTPATIENT)
Dept: UROLOGY | Facility: CLINIC | Age: 79
End: 2024-07-02
Attending: OBSTETRICS & GYNECOLOGY
Payer: MEDICARE

## 2024-07-02 VITALS — RESPIRATION RATE: 19 BRPM | HEIGHT: 61 IN | HEART RATE: 82 BPM | BODY MASS INDEX: 20 KG/M2

## 2024-07-02 DIAGNOSIS — N81.84 PELVIC MUSCLE WASTING: ICD-10-CM

## 2024-07-02 DIAGNOSIS — R33.9 INCOMPLETE EMPTYING OF BLADDER: ICD-10-CM

## 2024-07-02 DIAGNOSIS — N95.2 POST-MENOPAUSAL ATROPHIC VAGINITIS: ICD-10-CM

## 2024-07-02 DIAGNOSIS — N81.2 INCOMPLETE UTEROVAGINAL PROLAPSE: Primary | ICD-10-CM

## 2024-07-02 PROCEDURE — 99212 OFFICE O/P EST SF 10 MIN: CPT

## 2024-07-02 NOTE — PROGRESS NOTES
Pt presents to follow up bulge  Doing well with #4 ring with support pessary, office care  Reports pessary is comfortable   Denies discharge  Denies bleeding  Denies s/sx of UTI  Bowels regular   Pt is using vaginal estrogen cream twice weekly  Completed PT with [facility], void assessment wnl at visit on 12/14/23     Happy with pessary, feels supported  She is not currently interested in surgical management of her pelvic organ prolapse.    Urogynecology Summary:  Urogynecology Summary  Prolapse: No  KAREN: No  Urge Incontinence: No  Nocturia Frequency: 1  Frequency: 2 hours  Incomplete emptying: No  Constipation: No  Wears pad day?: 1  Wears Pad Night?: 1  Activities are limited by UI/POP?: No  Currently Sexually Active: No      Vitals:  Vitals:    07/02/24 1045   Pulse: 82   Resp: 19       GENERAL EXAM:  GENERAL:  NAD  HEAD: NC/AT  EYES: symmetric bilaterally. No icterus. Sclera w/o injection  NECK: no masses  LUNGS:  Normal resp effort  ABDOMEN:  Soft, no mass  MUSK: normal gait, ROM wnl. No edema  PSYCH: A&Ox3. Recent and remote memory intact    PELVIC EXAM: TRUMAN Silva, present for exam as a chaperone  Ext. Gen: +atrophy, no lesions  Urethra: +atrophy, nontender,   Bladder:+fullness, nontender  Vagina: +atrophy, no lesions  Cervix: no bleeding, no lesions, nontender  Uterus: +mobile     PELVIC SUPPORT:  [place]: 3  Anterior: 3  Posterior: 3    Her pessary was removed, cleaned, and reinserted w/o difficulty    Impression/Plan:    ICD-10-CM    1. Incomplete uterovaginal prolapse  N81.2       2. Incomplete emptying of bladder  R33.9       3. Pelvic muscle wasting  N81.84       4. Post-menopausal atrophic vaginitis  N95.2             Discussion Items:   Discussed mgmt of vulvovaginal atrophy with vaginal estrogen cream. Reviewed associated benefits, risks, alternatives, and goals. Recommend low dose twice weekly mgmt   Discussed management of pelvic organ prolapse including but not limited to behavioral modifications,  conservative options, and surgical management.   Discussed pessary management including benefits and risks. Discussed importance of keeping regularly scheduled pessary checks in prevention of complications related to pessary use.     Continue pessary management, office care  Continue vag estrogen twice weekly  Daily pelvic exercises  Bowel management reviewed  Call with s/sx of UTI, problems with pessary   All questions answered  Pt understands and agrees to plan       Return in about 3 months (around 10/2/2024) for pessary care, sooner prn .      Holly Manzano PA-C      The 21st Century Cures Act makes medical notes like these available to patients in the interest of transparency. However, be advised this is a medical document. It is intended as peer to peer communication. It is written in medical language and may contain abbreviations or verbiage that are unfamiliar. It may appear blunt or direct. Medical documents are intended to carry relevant information, facts as evident, and the clinical opinion of the practitioner.

## 2024-07-30 RX ORDER — MECLIZINE HYDROCHLORIDE 25 MG/1
25 TABLET ORAL 3 TIMES DAILY PRN
Qty: 30 TABLET | Refills: 3 | Status: SHIPPED | OUTPATIENT
Start: 2024-07-30

## 2024-07-30 NOTE — TELEPHONE ENCOUNTER
Refill passed per [facility] protocols.    Requested Prescriptions   Pending Prescriptions Disp Refills    MECLIZINE 25 MG Oral Tab [Pharmacy Med Name: Meclizine Hydrochloride 25 Mg Tab Zydu] 30 tablet 2     Sig: TAKE ONE TABLET BY MOUTH THREE TIMES DAILY AS NEEDED       Gastrointestional Medication Protocol Passed - 7/27/2024  9:44 AM        Passed - In person appointment or virtual visit in the past 12 mos or appointment in next 3 mos     Recent Outpatient Visits              4 weeks ago Incomplete uterovaginal prolapse    [facility] [place] [facility] Holly Manzano PA-C    Office Visit    2 months ago Abrasion    [facility] Scott, MD    Office Visit    2 months ago Wound of skin    [facility], [facility]In Clinic, [address] [place] [address] Jolanta Yañez PA-C    Office Visit    3 months ago Incomplete uterovaginal prolapse    [facility] [place] [facility] Holly Manzano PA-C    Office Visit    4 months ago Encounter for annual health examination    [facility], [address] [place] [address] Isabel Mauricio DO    Office Visit          Future Appointments         Provider Department Appt Notes    In 1 month Isabel Mauricio DO [address] 6 month f/up    In 2 months Holly Manzano PA-C [facility] [place] [facility] 3 MONTH PESSARY CHECK    In 2 months Oksar Lee MD [facility]

## 2024-08-21 ENCOUNTER — TELEPHONE (OUTPATIENT)
Dept: INTERNAL MEDICINE CLINIC | Facility: CLINIC | Age: 79
End: 2024-08-21

## 2024-08-21 RX ORDER — RALOXIFENE HYDROCHLORIDE 60 MG/1
60 TABLET, FILM COATED ORAL DAILY
Qty: 90 TABLET | Refills: 0 | Status: SHIPPED | OUTPATIENT
Start: 2024-08-21

## 2024-08-21 NOTE — TELEPHONE ENCOUNTER
Ok to use 3/11/24 physical. Just ask patient if she has had any new medical problems, and if no, ok for a note stating ok to work.   Does she no longer work at [employer]??

## 2024-08-21 NOTE — TELEPHONE ENCOUNTER
Please review; protocol failed/ has no protocol    Requested Prescriptions   Pending Prescriptions Disp Refills    RALOXIFENE 60 MG Oral Tab [Pharmacy Med Name: Raloxifene Hydrochloride 60 Mg Tab Nort] 90 tablet 0     Sig: TAKE ONE TABLET BY MOUTH ONCE DAILY       Osteoporosis Medication Protocol Failed - 8/20/2024 10:19 AM        Failed - DEXA scan within past 2 years        Failed - CMP within the past 12 months        Passed - In person appointment or virtual visit in the past 6 mos or appointment in next 3 mos     Recent Outpatient Visits              1 month ago Incomplete uterovaginal prolapse    [facility] [place] [facility] Holly Manzano PA-C    Office Visit    3 months ago Abrasion    [facility] Scott, MD    Office Visit    3 months ago Wound of skin    [facility], Walk-In Clinic, [address] [place] [address] Jolanta Yañez PA-C    Office Visit    3 months ago Incomplete uterovaginal prolapse    [facility] [place] [facility] Holly Manzano PA-C    Office Visit    5 months ago Encounter for annual health examination    [facility], [address] [place] [address] Isabel Mauricio DO    Office Visit          Future Appointments         Provider Department Appt Notes    In 4 weeks Isabel Mauricio DO [address] 6 month f/up    In 1 month Holly Manzano PA-C [facility] [place] [facility] 3 MONTH PESSARY CHECK    In 1 month Oskar Lee MD [facility]                     Passed - Calcium level between 8.3 and 10.3     Lab Results   Component Value Date    CA 9.4 12/04/2023               Passed - GFR level greater than 35     GFR Evaluation  EGFRCR: 80 , resulted on 12/4/2023             Recent Outpatient Visits              1 month ago Incomplete uterovaginal prolapse    [facility] [place]  [facility] Holly Manzano PA-C    Office Visit    3 months ago Abrasion    [facility] Scott, MD    Office Visit    3 months ago Wound of skin    [facility], Walk-In Clinic, [address] [place] [address] Jolanta Yañez PA-C    Office Visit    3 months ago Incomplete uterovaginal prolapse    [facility] Holly Manzano PA-C    Office Visit    5 months ago Encounter for annual health examination    [facility], [address] [place] [address] Isabel Mauricio DO    Office Visit          Future Appointments         Provider Department Appt Notes    In 4 weeks Isabel Mauricio DO [facility], [address] [place] [address] 6 month f/up    In 1 month Holly Manzano PA-C [facility] [place] [facility] 3 MONTH PESSARY CHECK    In 1 month Oskar Lee MD [facility]

## 2024-08-21 NOTE — TELEPHONE ENCOUNTER
Patient recently started a new job at [employer] and needs records of the date of the physical this year with Dr. Isabel Mauricio on 3/11/2024.  Patient states the school requires a physical within 90 days of start date. Patient states that she was not provided with any paperwork to complete by the school.  Requesting a letter head from our company stating she has had her physical.  Patient would like to  by the end of this week, in order to begin job.  Patient stated that she would pay the full cost of the annual wellness visit if necessary.  Please advise.  No office visits available within this week with Dr. Isabel Mauricio

## 2024-08-21 NOTE — TELEPHONE ENCOUNTER
RN to pt call, relayed MD message from below- pt states she has not had any new medical problems since last visit, and she was let go from [employer] about 6 months ago.  Informed letter will be sent to her [employer] account, she can access letter via \"Letters\" tab and print as many as her school requires.

## 2024-08-21 NOTE — TELEPHONE ENCOUNTER
RN to pt call for more information.  Pt states she will be the  at school and is need of a physical within 90 days of hire (pt is starting her job Monday 8/26).     Pt states her employer informed her if she has already had a physical within the year MD can change dates on the form if she is comfortable doing so, or she can be seen for additional physical (although insurance may not cover that additional visit).  Pt is willing to come in to office if MD is uncomfortable writing a letter stating that pt has regular routine physicals and has had this within 90d of hire.  Advised pt OV will likely be needed as dates cannot be addended/changed, pt understands.    Dr. Mauricio- Please advise if pt can be accommodated for physical, pt starts her new job on Monday.  Would she be able to be seen by other provider if unable to squeeze onto your schedule?

## 2024-09-03 RX ORDER — MECLIZINE HYDROCHLORIDE 25 MG/1
25 TABLET ORAL 3 TIMES DAILY PRN
Qty: 30 TABLET | Refills: 3 | Status: SHIPPED | OUTPATIENT
Start: 2024-09-03

## 2024-09-03 NOTE — TELEPHONE ENCOUNTER
Refill passed per [facility] protocol.  Requested Prescriptions   Pending Prescriptions Disp Refills    MECLIZINE 25 MG Oral Tab [Pharmacy Med Name: Meclizine Hydrochloride 25 Mg Tab Zydu] 30 tablet 0     Sig: TAKE ONE TABLET BY MOUTH THREE TIMES DAILY AS NEEDED       Gastrointestional Medication Protocol Passed - 9/1/2024 12:06 AM        Passed - In person appointment or virtual visit in the past 12 mos or appointment in next 3 mos     Recent Outpatient Visits              2 months ago Incomplete uterovaginal prolapse    [facility] [place] [facility] Holly Manzano PA-C    Office Visit    3 months ago Abrasion    [facility] Scott, MD    Office Visit    3 months ago Wound of skin    [facility], Walk-In Clinic, [address] [place], [place] [address]Jolanta crowell PA-C    Office Visit    4 months ago Incomplete uterovaginal prolapse    [facility] [place] [facility] Holly Manzano PA-C    Office Visit    5 months ago Encounter for annual health examination    [facility], [address] [place] [address] Isabel Mauricio DO    Office Visit          Future Appointments         Provider Department Appt Notes    In 2 weeks Isabel Mauricio DO [address] 6 month f/up    In 4 weeks Holly Manzano PA-C [facility] [place] [facility] 3 MONTH PESSARY CHECK    In 1 month Oskar Lee MD [facility]                        Future Appointments         Provider Department Appt Notes    In 2 weeks Isabel Mauricio DO [facility], [address] [place] [address] 6 month f/up    In 4 weeks Holly Manzano PA-C [facility] [place] [facility] 3 MONTH PESSARY CHECK    In 1 month Oskar Lee MD [facility]           Recent Outpatient Visits              2 months ago Incomplete uterovaginal prolapse     [facility] [place] [facility] Holly Manzano PA-C    Office Visit    3 months ago Abrasion    [facility] Oskar Olguin MD    Office Visit    3 months ago Wound of skin    [facility], Walk-In Clinic, [address] [place] [address] Jolanta Yañez PA-C    Office Visit    4 months ago Incomplete uterovaginal prolapse    [facility] [place] [facility] Holly Manzano PA-C    Office Visit    5 months ago Encounter for annual health examination    [facility], [address] [place] [address] Isabel Mauricio DO    Office Visit

## 2024-09-05 NOTE — TELEPHONE ENCOUNTER
Please review. Protocol Failed; No Protocol    Requested Prescriptions   Pending Prescriptions Disp Refills    DESONIDE 0.05 % External Lotion [Pharmacy Med Name: Desonide 0.05 % Lot Acta] 118 mL 0     Sig: Apply to affected area(S) topically 2 times daily.       There is no refill protocol information for this order      Signed Prescriptions Disp Refills    omeprazole 20 MG Oral Capsule Delayed Release 90 capsule 3     Sig: TAKE ONE CAPSULE BY MOUTH EVERY MORNING       Gastrointestional Medication Protocol Passed - 9/3/2024  2:46 PM        Passed - In person appointment or virtual visit in the past 12 mos or appointment in next 3 mos     Recent Outpatient Visits              2 months ago Incomplete uterovaginal prolapse    [facility] [place] [facility] Holly Manzano PA-C    Office Visit    3 months ago Abrasion    [facility] Scott, MD    Office Visit    4 months ago Wound of skin    [facility], Walk-In Clinic, [address] [place] [address] Jolanta Yañez PA-C    Office Visit    4 months ago Incomplete uterovaginal prolapse    [facility] [place] [facility] Holly Manzano PA-C    Office Visit    5 months ago Encounter for annual health examination    [facility], [address] [place] [address] Isabel Mauricio DO    Office Visit          Future Appointments         Provider Department Appt Notes    In 1 week Isabel Mauricio DO [facility], [address] [place] [address] 6 month f/up    In 3 weeks Holly Manzano PA-C [facility] [place] [facility] 3 MONTH PESSARY CHECK    In 1 month Oskar Lee MD [facility]                            Future Appointments         Provider Department Appt Notes    In 1 week Isabel Mauricio DO [facility], [address] [place] [address] 6 month f/up    In 3 weeks Holly Manzano PA-C [facility] [place] [facility] 3 MONTH PESSARY CHECK    In 1 month Oskar Lee MD [facility]           Recent Outpatient Visits              2 months ago Incomplete uterovaginal prolapse    [facility] [place] [facility] Holly Manzano PA-C    Office Visit    3 months ago Abrasion    [facility] Scott, MD    Office Visit    4 months ago Wound of skin    [facility], Walk-In [facility], [address] [place] [address] Jolanta Yañez PA-C    Office Visit    4 months ago Incomplete uterovaginal prolapse    [facility] [place] [facility] Holly Manzano PA-C    Office Visit    5 months ago Encounter for annual health examination    [facility], [address] [place] [address] Isabel Mauricio DO    Office Visit

## 2024-09-06 RX ORDER — DESONIDE 0.5 MG/ML
1 LOTION TOPICAL 2 TIMES DAILY
Qty: 118 ML | Refills: 0 | Status: SHIPPED | OUTPATIENT
Start: 2024-09-06

## 2024-09-18 ENCOUNTER — OFFICE VISIT (OUTPATIENT)
Dept: INTERNAL MEDICINE CLINIC | Facility: CLINIC | Age: 79
End: 2024-09-18
Payer: MEDICARE

## 2024-09-18 VITALS
HEIGHT: 61 IN | SYSTOLIC BLOOD PRESSURE: 124 MMHG | OXYGEN SATURATION: 97 % | HEART RATE: 85 BPM | BODY MASS INDEX: 21.71 KG/M2 | WEIGHT: 115 LBS | DIASTOLIC BLOOD PRESSURE: 72 MMHG

## 2024-09-18 DIAGNOSIS — I10 ESSENTIAL HYPERTENSION: Primary | ICD-10-CM

## 2024-09-18 PROCEDURE — G2211 COMPLEX E/M VISIT ADD ON: HCPCS | Performed by: FAMILY MEDICINE

## 2024-09-18 PROCEDURE — 99213 OFFICE O/P EST LOW 20 MIN: CPT | Performed by: FAMILY MEDICINE

## 2024-09-18 NOTE — PROGRESS NOTES
Subjective:   Patient ID: Ally Mei is a 79 year old female.    HPI Here for f/u on BP. Taking med as prescribed with no issues.     History/Other:   Review of Systems   Respiratory:  Negative for chest tightness and shortness of breath.    Cardiovascular:  Negative for chest pain and palpitations.   Neurological:  Negative for dizziness and headaches.     Current Outpatient Medications   Medication Sig Dispense Refill    Desonide 0.05 % External Lotion Apply 1 Application topically 2 (two) times daily. 118 mL 0    omeprazole 20 MG Oral Capsule Delayed Release TAKE ONE CAPSULE BY MOUTH EVERY MORNING 90 capsule 3    meclizine 25 MG Oral Tab Take 1 tablet (25 mg total) by mouth 3 (three) times daily as needed. 30 tablet 3    raloxifene 60 MG Oral Tab Take 1 tablet (60 mg total) by mouth daily. 90 tablet 0    mupirocin 2 % External Ointment Apply 1 Application topically 2 (two) times daily. 30 g 2    estradiol 0.1 MG/GM Vaginal Cream Place vaginally twice a week.      lisinopril 10 MG Oral Tab Take 1 tablet (10 mg total) by mouth daily. 90 tablet 1    triamcinolone 0.1 % External Ointment Apply 1 Application topically 2 (two) times daily. 30 g 2    Multiple Vitamins-Minerals (CENTRUM) Oral Tab Take 1 tablet by mouth daily.       Allergies:  Allergies   Allergen Reactions    Bisphosphonates OTHER (SEE COMMENTS)     Sig bone pain       Objective:   Physical Exam  Vitals reviewed.   Constitutional:       Appearance: Normal appearance. She is well-developed.   HENT:      Head: Normocephalic and atraumatic.   Cardiovascular:      Rate and Rhythm: Normal rate and regular rhythm.      Heart sounds: Normal heart sounds.   Pulmonary:      Effort: Pulmonary effort is normal.      Breath sounds: Normal breath sounds.   Neurological:      Mental Status: She is alert.   Psychiatric:         Mood and Affect: Mood normal.         Behavior: Behavior normal.         Assessment & Plan:   1. Essential hypertension    BP  controlled on med. Continue.     No orders of the defined types were placed in this encounter.      Meds This Visit:  Requested Prescriptions      No prescriptions requested or ordered in this encounter       Imaging & Referrals:  None

## 2024-09-20 ENCOUNTER — TELEPHONE (OUTPATIENT)
Dept: UROLOGY | Facility: CLINIC | Age: 79
End: 2024-09-20

## 2024-09-20 RX ORDER — ESTRADIOL 0.1 MG/G
CREAM VAGINAL
Qty: 42.5 G | Refills: 3 | Status: SHIPPED | OUTPATIENT
Start: 2024-09-20

## 2024-09-30 DIAGNOSIS — I10 ESSENTIAL HYPERTENSION: ICD-10-CM

## 2024-10-01 ENCOUNTER — OFFICE VISIT (OUTPATIENT)
Dept: UROLOGY | Facility: CLINIC | Age: 79
End: 2024-10-01
Attending: OBSTETRICS & GYNECOLOGY
Payer: MEDICARE

## 2024-10-01 VITALS — RESPIRATION RATE: 18 BRPM | HEIGHT: 61 IN | BODY MASS INDEX: 22 KG/M2

## 2024-10-01 DIAGNOSIS — N81.84 PELVIC MUSCLE WASTING: ICD-10-CM

## 2024-10-01 DIAGNOSIS — R33.9 INCOMPLETE EMPTYING OF BLADDER: ICD-10-CM

## 2024-10-01 DIAGNOSIS — N95.2 POST-MENOPAUSAL ATROPHIC VAGINITIS: ICD-10-CM

## 2024-10-01 DIAGNOSIS — N81.2 INCOMPLETE UTEROVAGINAL PROLAPSE: Primary | ICD-10-CM

## 2024-10-01 PROCEDURE — 99212 OFFICE O/P EST SF 10 MIN: CPT

## 2024-10-01 NOTE — PROGRESS NOTES
Pt presents to follow up bulge  Doing well with #4 ring with support pessary, office care  Reports pessary is comfortable   Denies discharge  Denies bleeding  Denies s/sx of UTI  Bowels regular   Pt is using vaginal estrogen cream twice weekly    Happy with pessary, feels supported  She is not currently interested in surgical management of her pelvic organ prolapse.    Urogynecology Summary:  Urogynecology Summary  Prolapse: No  KAREN: No  Urge Incontinence: No  Nocturia Frequency: 2  Frequency: 2 - 3 hours  Incomplete emptying: No  Constipation: No  Wears pad day?: 1 (light)  Wears Pad Night?: 1 (light)  Activities are limited by UI/POP?: No  Currently Sexually Active: No      Vitals:  Vitals:    10/01/24 1436   Resp: 18       GENERAL EXAM:  GENERAL:  NAD  HEAD: NC/AT  EYES: symmetric bilaterally. No icterus. Sclera w/o injection  NECK: no masses  LUNGS:  Normal resp effort  ABDOMEN:  Soft, no mass  MUSK: normal gait, ROM wnl. No edema  PSYCH: A&Ox3. Recent and remote memory intact    PELVIC EXAM: TRUMAN Silva, present for exam as a chaperone  Ext. Gen: +atrophy, no lesions  Urethra: +atrophy, nontender,   Bladder:+fullness, nontender  Vagina: +atrophy, no lesions  Cervix: no bleeding, no lesions, nontender  Uterus: +mobile     PELVIC SUPPORT:  [place]: 3  Anterior: 3  Posterior: 3    Her pessary was removed, cleaned, and reinserted w/o difficulty    Impression/Plan:    ICD-10-CM    1. Incomplete uterovaginal prolapse  N81.2       2. Incomplete emptying of bladder  R33.9       3. Pelvic muscle wasting  N81.84       4. Post-menopausal atrophic vaginitis  N95.2             Discussion Items:   Discussed mgmt of vulvovaginal atrophy with vaginal estrogen cream. Reviewed associated benefits, risks, alternatives, and goals. Recommend low dose twice weekly mgmt   Discussed management of pelvic organ prolapse including but not limited to behavioral modifications, conservative options, and surgical management.   Discussed pessary  management including benefits and risks. Discussed importance of keeping regularly scheduled pessary checks in prevention of complications related to pessary use.     Continue pessary management, office care  Continue vag estrogen twice weekly  Daily pelvic exercises  Bowel management reviewed  Call with s/sx of UTI, problems with pessary   All questions answered  Pt understands and agrees to plan       Return in about 3 months (around 1/1/2025) for pessary care, sooner prn .      Holly Manzano PA-C      The 21st Century Cures Act makes medical notes like these available to patients in the interest of transparency. However, be advised this is a medical document. It is intended as peer to peer communication. It is written in medical language and may contain abbreviations or verbiage that are unfamiliar. It may appear blunt or direct. Medical documents are intended to carry relevant information, facts as evident, and the clinical opinion of the practitioner.

## 2024-10-02 RX ORDER — LISINOPRIL 10 MG/1
10 TABLET ORAL DAILY
Qty: 90 TABLET | Refills: 3 | Status: SHIPPED | OUTPATIENT
Start: 2024-10-02

## 2024-10-02 NOTE — TELEPHONE ENCOUNTER
Please review.  Protocol failed / Has no protocol.     Requested Prescriptions   Pending Prescriptions Disp Refills    LISINOPRIL 10 MG Oral Tab [Pharmacy Med Name: Lisinopril 10 Mg Tab Solc] 90 tablet 3     Sig: TAKE ONE TABLET BY MOUTH ONCE DAILY       Hypertension Medications Protocol Passed - 9/30/2024  9:49 AM        Passed - CMP or BMP in past 12 months        Passed - Last BP reading less than 140/90     BP Readings from Last 1 Encounters:   09/18/24 124/72               Passed - In person appointment or virtual visit in the past 12 mos or appointment in next 3 mos     Recent Outpatient Visits              Yesterday Incomplete uterovaginal prolapse    [facility] [place] [facility] Holly Manzano PA-C    Office Visit    2 weeks ago Essential hypertension    [facility], [address] [place] [address] Isabel Mauricio DO    Office Visit    3 months ago Incomplete uterovaginal prolapse    [facility] [place] [facility] Holly Manzano PA-C    Office Visit    4 months ago Abrasion    [facility] SHARMA Scott, MD    Office Visit    4 months ago Wound of skin    [facility], Walk-In Clinic, [address] [facility] Jolanta Yañez PA-C    Office Visit          Future Appointments         Provider Department Appt Notes    In 1 week Oskar Lee MD [facility]     In 3 months Holly Manzano PA-C [facility] [place] [facility] 3 MONTH PESSARY CHECK                    Passed - EGFRCR or GFRNAA > 50     GFR Evaluation  EGFRCR: 80 , resulted on 12/4/2023            RALOXIFENE 60 MG Oral Tab [Pharmacy Med Name: Raloxifene Hydrochloride 60 Mg Tab Nort] 90 tablet 3     Sig: Take 1 tablet by mouth daily.       Osteoporosis Medication Protocol Failed - 9/30/2024  9:49 AM        Failed - DEXA scan within past 2 years        Failed - CMP within the past 12 months        Passed - In person  appointment or virtual visit in the past 6 mos or appointment in next 3 mos     Recent Outpatient Visits              Yesterday Incomplete uterovaginal prolapse    [facility] [place] [facility] Holly Manzano PA-C    Office Visit    2 weeks ago Essential hypertension    [facility], [address] [place] [address] Isabel Mauricio DO    Office Visit    3 months ago Incomplete uterovaginal prolapse    [facility] [place] [facility] Holly Manzano PA-C    Office Visit    4 months ago Abrasion    [facility] Scott, MD    Office Visit    4 months ago Wound of skin    [facility], Walk-In Clinic, [address] [place] [address] Jolanta Yañez PA-C    Office Visit          Future Appointments         Provider Department Appt Notes    In 1 week Oskar Lee MD [facility]     In 3 months Holly Manzano PA-C [facility] [place] [facility] 3 MONTH PESSARY CHECK                    Passed - Calcium level between 8.3 and 10.3     Lab Results   Component Value Date    CA 9.4 12/04/2023               Passed - GFR level greater than 35     GFR Evaluation  EGFRCR: 80 , resulted on 12/4/2023             Future Appointments         Provider Department Appt Notes    In 1 week Oskar Lee MD [facility]     In 3 months Holly Manzano PA-C [facility] [place] [facility] 3 MONTH PESSARY CHECK          Recent Outpatient Visits              Yesterday Incomplete uterovaginal prolapse    [facility] [place] [facility] Holly Manzano PA-C    Office Visit    2 weeks ago Essential hypertension    [facility], [address] [place], [place] [address]Isabel Bright DO    Office Visit    3 months ago Incomplete uterovaginal prolapse    [facility] [place] [facility] Holly Manzano PA-C    Office Visit    4  months ago Abrasion    [facility] Scott, MD    Office Visit    4 months ago Wound of skin    [facility], Walk-In Clinic, [address] [place] [address] Jolanta Yañez PA-C    Office Visit

## 2024-10-03 RX ORDER — RALOXIFENE HYDROCHLORIDE 60 MG/1
60 TABLET, FILM COATED ORAL DAILY
Qty: 90 TABLET | Refills: 3 | Status: SHIPPED | OUTPATIENT
Start: 2024-10-03

## 2024-10-07 RX ORDER — MECLIZINE HYDROCHLORIDE 25 MG/1
25 TABLET ORAL 3 TIMES DAILY PRN
Qty: 90 TABLET | Refills: 0 | Status: SHIPPED | OUTPATIENT
Start: 2024-10-07

## 2024-10-07 NOTE — TELEPHONE ENCOUNTER
Please review.  Patient refills every 10 days.  Can we give more than 30?    Requested Prescriptions   Pending Prescriptions Disp Refills    MECLIZINE 25 MG Oral Tab [Pharmacy Med Name: Meclizine Hydrochloride 25 Mg Tab Zydu] 90 tablet      Sig: Take 1 tablet (25 mg total) by mouth 3 (three) times daily as needed.       Gastrointestional Medication Protocol Passed - 10/6/2024 12:03 AM        Passed - In person appointment or virtual visit in the past 12 mos or appointment in next 3 mos     Recent Outpatient Visits              6 days ago Incomplete uterovaginal prolapse    [facility] [place] [facility] Holly Manzano PA-C    Office Visit    2 weeks ago Essential hypertension    [facility], [address] [place] [address] Isabel Mauricio DO    Office Visit    3 months ago Incomplete uterovaginal prolapse    [facility] [place] [facility] Holly Manzano PA-C    Office Visit    4 months ago Abrasion    [facility] Scott, MD    Office Visit    5 months ago Wound of skin    [facility], Walk-In Clinic, [address] [place] [address] Jolanta Yañez PA-C    Office Visit          Future Appointments         Provider Department Appt Notes    In 1 week Oskar Lee MD [facility]     In 2 months Holly Manzano PA-C [facility] [place] [facility] 3 MONTH PESSARY CHECK                       Future Appointments         Provider Department Appt Notes    In 1 week Oskar Lee MD [facility]     In 2 months Holly Manzano PA-C [facility] [place] [facility] 3 MONTH PESSARY CHECK          Recent Outpatient Visits              6 days ago Incomplete uterovaginal prolapse    [facility] [place] [facility] Holly Manzano PA-C    Office Visit    2 weeks ago Essential hypertension    [address]  Isabel Vora DO    Office Visit    3 months ago Incomplete uterovaginal prolapse    [facility] Holly Manzano, KISHOR    Office Visit    4 months ago Abrasion    [facility] Oskar Olguin MD    Office Visit    5 months ago Wound of skin    [facility], Walk-In Clinic, [address] [place] [address], Jolanta Fernandez PA-C    Office Visit

## 2024-11-04 RX ORDER — DESONIDE 0.5 MG/ML
1 LOTION TOPICAL 2 TIMES DAILY
Qty: 118 ML | Refills: 0 | Status: SHIPPED | OUTPATIENT
Start: 2024-11-04

## 2024-11-04 NOTE — TELEPHONE ENCOUNTER
Please review. Protocol Failed; No Protocol    Requested Prescriptions   Pending Prescriptions Disp Refills    DESONIDE 0.05 % External Lotion [Pharmacy Med Name: Desonide 0.05 % Lot Acta] 118 mL 0     Sig: Apply 1 Application topically 2 (two) times daily.       There is no refill protocol information for this order            Future Appointments         Provider Department Appt Notes    In 1 month Holly Manzano PA-C [facility] [place] [facility] 3 MONTH PESSARY CHECK          Recent Outpatient Visits              3 weeks ago Seborrheic keratoses    [facility] Scott, MD    Office Visit    1 month ago Incomplete uterovaginal prolapse    [facility] [place] [facility] Holly Manzano PA-C    Office Visit    1 month ago Essential hypertension    [facility], [address] [place], [place] [address]Isabel Fitzgerald DO    Office Visit    4 months ago Incomplete uterovaginal prolapse    [facility] Holly Manzano PA-C    Office Visit    5 months ago Abrasion    [facility] Scott, MD    Office Visit

## 2024-12-16 ENCOUNTER — OFFICE VISIT (OUTPATIENT)
Dept: FAMILY MEDICINE CLINIC | Facility: CLINIC | Age: 79
End: 2024-12-16
Payer: MEDICARE

## 2024-12-16 VITALS
HEART RATE: 88 BPM | OXYGEN SATURATION: 100 % | DIASTOLIC BLOOD PRESSURE: 77 MMHG | TEMPERATURE: 98 F | RESPIRATION RATE: 16 BRPM | SYSTOLIC BLOOD PRESSURE: 132 MMHG | WEIGHT: 110 LBS | BODY MASS INDEX: 20.24 KG/M2 | HEIGHT: 62 IN

## 2024-12-16 DIAGNOSIS — L84 CALLUS OF TOE: ICD-10-CM

## 2024-12-16 DIAGNOSIS — M20.42 HAMMER TOE OF LEFT FOOT: ICD-10-CM

## 2024-12-16 DIAGNOSIS — Z71.1 WORRIED WELL: Primary | ICD-10-CM

## 2024-12-16 PROCEDURE — 99212 OFFICE O/P EST SF 10 MIN: CPT | Performed by: FAMILY MEDICINE

## 2024-12-16 NOTE — PATIENT INSTRUCTIONS
You may stop the foot soaks and sprays for adhesive removal.   Apply topical antibiotic ointment to help prevent infection of abraded skin areas.   If redness or pain develop, follow-up for further evaluation.

## 2024-12-16 NOTE — PROGRESS NOTES
CHIEF COMPLAINT:     Chief Complaint   Patient presents with    Other     Suspects band aid is stuck on second toe of L foot, tried soaking at home       HPI:     Ally Mei is a 79 year old female who presents with concerns of possible foreign body in foot.  Pt states she had applied a bandaid to her 2nd toe and that the bandaid had stuck to her foot and she was unable to get it off. Pt has been soaking her feet and has applied otc adhesive remover without success. Pt denies pain or discharge from the area.       Current Outpatient Medications   Medication Sig Dispense Refill    lisinopril 10 MG Oral Tab Take 1 tablet (10 mg total) by mouth daily. 90 tablet 3    Desonide 0.05 % External Lotion Apply 1 Application topically 2 (two) times daily. 118 mL 0    meclizine 25 MG Oral Tab Take 1 tablet (25 mg total) by mouth 3 (three) times daily as needed. 90 tablet 0    raloxifene 60 MG Oral Tab Take 1 tablet (60 mg total) by mouth daily. 90 tablet 3    estradiol (ESTRACE) 0.1 MG/GM Vaginal Cream Apply 1 gram vaginally 3 times per week. 42.5 g 3    omeprazole 20 MG Oral Capsule Delayed Release TAKE ONE CAPSULE BY MOUTH EVERY MORNING 90 capsule 3    mupirocin 2 % External Ointment Apply 1 Application topically 2 (two) times daily. 30 g 2    triamcinolone 0.1 % External Ointment Apply 1 Application topically 2 (two) times daily. 30 g 2    Multiple Vitamins-Minerals (CENTRUM) Oral Tab Take 1 tablet by mouth daily.       No current facility-administered medications for this visit.      Past Medical History:    Arthritis    Esophageal reflux    Osteoarthritis    Osteoporosis    Skin disorder    Unspecified essential hypertension    Vertigo      Social History:  Social History     Socioeconomic History    Marital status:    Tobacco Use    Smoking status: Never    Smokeless tobacco: Never   Vaping Use    Vaping status: Never Used   Substance and Sexual Activity    Alcohol use: Yes     Alcohol/week: 7.0 standard  drinks of alcohol     Types: 7 Cans of beer per week     Comment: beer at bedtime    Drug use: No   Other Topics Concern    Caffeine Concern Yes    Stress Concern No    Weight Concern No    Special Diet No    Exercise Yes    Seat Belt Yes        REVIEW OF SYSTEMS:   GENERAL: feels well otherwise, no fever, no chills.  SKIN: as above.  CHEST: no chest pains, no palpitations.  LUNGS: denies shortness of breath with exertion or rest. No wheezing, no cough.  LYMPH: no enlargement of the lymph nodes.  MUSC/SKEL: no joint swelling, no joint stiffness.  NEURO: no abnormal sensation, no tingling of the skin or numbness.    EXAM:   /77   Pulse 88   Temp 98.3 °F (36.8 °C)   Resp 16   Ht 5' 2\" (1.575 m)   Wt 110 lb (49.9 kg)   SpO2 100%   BMI 20.12 kg/m²   GENERAL: well developed, well nourished,in no apparent distress  SKIN: exam of 2nd toe reveals a hammertoe with with surface callous. There is disruption/abrasion of the callous due to excoriation. There is no sign of adhesive bandage or other foreign body.  There is mild redness of the surrounding tissue, but no warmth or tenderness. Sensation is intact in the local area.   LUNGS: clear to auscultation bilaterally, no wheezes or rhonchi. Breathing is non labored.  CARDIO: RRR without murmur      ASSESSMENT AND PLAN:     ASSESSMENT:  Encounter Diagnoses   Name Primary?    Worried well Yes    Hammer toe of left foot     Callus of toe        PLAN: Skin care discussed with patient. Instructions and Comfort Care as listed in Patient Instructions.  Medication as below.    Requested Prescriptions      No prescriptions requested or ordered in this encounter       Patient Instructions   You may stop the foot soaks and sprays for adhesive removal.   Apply topical antibiotic ointment to help prevent infection of abraded skin areas.   If redness or pain develop, follow-up for further evaluation.     The patient indicates understanding of these issues and agrees to the  plan.

## 2024-12-19 RX ORDER — DESONIDE 0.5 MG/ML
1 LOTION TOPICAL 2 TIMES DAILY
Qty: 118 ML | Refills: 0 | Status: SHIPPED | OUTPATIENT
Start: 2024-12-19

## 2024-12-19 NOTE — TELEPHONE ENCOUNTER
Please Review. Protocol Failed; No Protocol     Requested Prescriptions   Pending Prescriptions Disp Refills    DESONIDE 0.05 % External Lotion [Pharmacy Med Name: Desonide 0.05 % Lot Acta] 118 mL 0     Sig: Apply 1 Application topically 2 (two) times daily TO AFFECTED AREAS       There is no refill protocol information for this order            Future Appointments         Provider Department Appt Notes    In 2 weeks Holly Manzano PA-C [facility] [place] [facility] 3 MONTH PESSARY CHECK          Recent Outpatient Visits              2 days ago Worried well    [facility], Walk-In Clinic, [address] [place] [address] Julissa Mao PA-C    Office Visit    2 months ago Seborrheic keratoses    [facility] Oskar Lee MD    Office Visit    2 months ago Incomplete uterovaginal prolapse    [facility] [place] [facility] Holly Manzano PA-C    Office Visit    3 months ago Essential hypertension    [facility], [address] [facility] [place] Isabel Mauricio DO    Office Visit    5 months ago Incomplete uterovaginal prolapse    [facility] [place] [facility] Holly Manzano PA-C    Office Visit

## 2025-01-02 ENCOUNTER — OFFICE VISIT (OUTPATIENT)
Dept: UROLOGY | Facility: CLINIC | Age: 80
End: 2025-01-02
Attending: OBSTETRICS & GYNECOLOGY
Payer: MEDICARE

## 2025-01-02 VITALS — BODY MASS INDEX: 20 KG/M2 | TEMPERATURE: 98 F | HEIGHT: 62 IN | RESPIRATION RATE: 16 BRPM

## 2025-01-02 DIAGNOSIS — N95.2 POST-MENOPAUSAL ATROPHIC VAGINITIS: ICD-10-CM

## 2025-01-02 DIAGNOSIS — R33.9 INCOMPLETE EMPTYING OF BLADDER: ICD-10-CM

## 2025-01-02 DIAGNOSIS — N81.2 INCOMPLETE UTEROVAGINAL PROLAPSE: Primary | ICD-10-CM

## 2025-01-02 DIAGNOSIS — N81.84 PELVIC MUSCLE WASTING: ICD-10-CM

## 2025-01-02 PROCEDURE — 99212 OFFICE O/P EST SF 10 MIN: CPT

## 2025-01-02 NOTE — PROGRESS NOTES
Pt presents to follow up bulge  Doing well with #4 ring with support pessary, office care  Reports pessary is comfortable   Denies discharge  Denies bleeding  Denies s/sx of UTI  Bowels regular   Pt is using vaginal estrogen cream 3x weekly    Happy with pessary, feels supported  She is not currently interested in surgical management of her pelvic organ prolapse.    Urogynecology Summary:  Urogynecology Summary  Prolapse: No  KAREN: No  Urge Incontinence: No  Nocturia Frequency: 2 (1-2 x)  Frequency: 2 - 3 hours  Incomplete emptying: No  Constipation: No  Wears pad day?: 1  Wears Pad Night?: 1  Activities are limited by UI/POP?: No  Currently Sexually Active: No      Vitals:  Vitals:    01/02/25 1024   Resp: 16   Temp: 97.8 °F (36.6 °C)       GENERAL EXAM:  GENERAL:  NAD  HEAD: NC/AT  EYES: symmetric bilaterally. No icterus. Sclera w/o injection  NECK: no masses  LUNGS:  Normal resp effort  ABDOMEN:  Soft, no mass  MUSK: normal gait, ROM wnl. No edema  PSYCH: A&Ox3. Recent and remote memory intact    PELVIC EXAM: TRUMAN Silva, present for exam as a chaperone  Ext. Gen: +atrophy, no lesions  Urethra: +atrophy, nontender,   Bladder:+fullness, nontender  Vagina: +atrophy, no lesions  Cervix: no bleeding, no lesions, nontender  Uterus: +mobile     PELVIC SUPPORT:  [place]: 3  Anterior: 3  Posterior: 3    Her pessary was removed, cleaned, and reinserted w/o difficulty    Impression/Plan:    ICD-10-CM    1. Incomplete uterovaginal prolapse  N81.2       2. Incomplete emptying of bladder  R33.9       3. Pelvic muscle wasting  N81.84       4. Post-menopausal atrophic vaginitis  N95.2             Discussion Items:   Discussed mgmt of vulvovaginal atrophy with vaginal estrogen cream. Reviewed associated benefits, risks, alternatives, and goals. Recommend low dose 2-3x weekly mgmt   Discussed management of pelvic organ prolapse including but not limited to behavioral modifications, conservative options, and surgical management.    Discussed pessary management including benefits and risks. Discussed importance of keeping regularly scheduled pessary checks in prevention of complications related to pessary use.     Continue pessary management, office care  Continue vag estrogen 2-3x weekly  Daily pelvic exercises  Bowel management reviewed  Call with s/sx of UTI, problems with pessary   All questions answered  Pt understands and agrees to plan       Return in about 3 months (around 4/2/2025) for pessary care, sooner prn .      Holly Manzano PA-C      The 21st Century Cures Act makes medical notes like these available to patients in the interest of transparency. However, be advised this is a medical document. It is intended as peer to peer communication. It is written in medical language and may contain abbreviations or verbiage that are unfamiliar. It may appear blunt or direct. Medical documents are intended to carry relevant information, facts as evident, and the clinical opinion of the practitioner.

## 2025-01-03 ENCOUNTER — TELEPHONE (OUTPATIENT)
Dept: INTERNAL MEDICINE CLINIC | Facility: CLINIC | Age: 80
End: 2025-01-03

## 2025-01-03 DIAGNOSIS — I10 ESSENTIAL HYPERTENSION: Primary | ICD-10-CM

## 2025-01-03 DIAGNOSIS — Z13.0 SCREENING FOR DEFICIENCY ANEMIA: ICD-10-CM

## 2025-01-03 DIAGNOSIS — Z13.0 SCREENING FOR DISORDER OF BLOOD AND BLOOD-FORMING ORGANS: ICD-10-CM

## 2025-01-03 DIAGNOSIS — Z00.00 ROUTINE GENERAL MEDICAL EXAMINATION AT A HEALTH CARE FACILITY: ICD-10-CM

## 2025-01-03 DIAGNOSIS — Z13.220 SCREENING FOR LIPID DISORDERS: ICD-10-CM

## 2025-01-03 NOTE — TELEPHONE ENCOUNTER
Future Appointments   Date Time Provider Department Center   3/13/2025  9:00 AM Isabel Mauricio DO [facility]   4/15/2025  9:00 AM Holly Manzano, KISHOR [facility] None     Orders to  [facility]    Pt informed that labs need to be completed no sooner than 2 weeks prior to the appt. Pt aware to fast-no call back required

## 2025-01-15 RX ORDER — DESONIDE 0.5 MG/ML
1 LOTION TOPICAL 2 TIMES DAILY
Qty: 118 ML | Refills: 0 | Status: SHIPPED | OUTPATIENT
Start: 2025-01-15

## 2025-01-15 NOTE — TELEPHONE ENCOUNTER
Please Review. Protocol Failed; No Protocol     Requested Prescriptions   Pending Prescriptions Disp Refills    DESONIDE 0.05 % External Lotion [Pharmacy Med Name: Desonide 0.05 % Lot Acta] 118 mL 0     Sig: Apply 1 Application topically 2 (two) times daily.       There is no refill protocol information for this order            Future Appointments         Provider Department Appt Notes    In 1 month Isabel Mauricio DO [facility], [address] [place] [address] -last 3/24    In 3 months Holly Manzano PA-C [facility] [place] [facility] 3 MOS PESS CK          Recent Outpatient Visits              1 week ago Incomplete uterovaginal prolapse    [facility] [place] [facility] Holly Manzano PA-C    Office Visit    1 month ago Worried well    [facility], Walk-In Clinic, [address] [place] [address] Julissa Mao PA-C    Office Visit    3 months ago Seborrheic keratoses    [facility] Oskar Olguin MD    Office Visit    3 months ago Incomplete uterovaginal prolapse    [facility] [place] [facility] Holly Manzano PA-C    Office Visit    3 months ago Essential hypertension    [facility], [address] [place] [address] Isabel Mauricio DO    Office Visit

## 2025-02-21 RX ORDER — DESONIDE 0.5 MG/ML
1 LOTION TOPICAL 2 TIMES DAILY
Qty: 118 ML | Refills: 0 | Status: SHIPPED | OUTPATIENT
Start: 2025-02-21

## 2025-02-21 NOTE — TELEPHONE ENCOUNTER
Please review.  Protocol failed / Has no protocol.     Requested Prescriptions   Pending Prescriptions Disp Refills    DESONIDE 0.05 % External Lotion [Pharmacy Med Name: Desonide 0.05 % Lot Acta] 118 mL 0     Sig: Apply TO AFFECTED AREA(s) topically 2 times daily.       There is no refill protocol information for this order

## 2025-03-06 ENCOUNTER — LAB ENCOUNTER (OUTPATIENT)
Dept: LAB | Age: 80
End: 2025-03-06
Attending: FAMILY MEDICINE
Payer: MEDICARE

## 2025-03-06 DIAGNOSIS — Z13.0 SCREENING FOR DEFICIENCY ANEMIA: ICD-10-CM

## 2025-03-06 DIAGNOSIS — I10 ESSENTIAL HYPERTENSION: ICD-10-CM

## 2025-03-06 DIAGNOSIS — Z13.220 SCREENING FOR LIPID DISORDERS: ICD-10-CM

## 2025-03-06 DIAGNOSIS — Z13.0 SCREENING FOR DISORDER OF BLOOD AND BLOOD-FORMING ORGANS: ICD-10-CM

## 2025-03-06 DIAGNOSIS — Z00.00 ROUTINE GENERAL MEDICAL EXAMINATION AT A HEALTH CARE FACILITY: ICD-10-CM

## 2025-03-06 LAB
ALBUMIN SERPL-MCNC: 4.5 G/DL (ref 3.2–4.8)
ALBUMIN/GLOB SERPL: 1.7 {RATIO} (ref 1–2)
ALP LIVER SERPL-CCNC: 69 U/L
ALT SERPL-CCNC: 24 U/L
ANION GAP SERPL CALC-SCNC: 7 MMOL/L (ref 0–18)
AST SERPL-CCNC: 35 U/L (ref ?–34)
BASOPHILS # BLD AUTO: 0.07 X10(3) UL (ref 0–0.2)
BASOPHILS NFR BLD AUTO: 1.4 %
BILIRUB SERPL-MCNC: 0.6 MG/DL (ref 0.2–1.1)
BUN BLD-MCNC: 11 MG/DL (ref 9–23)
CALCIUM BLD-MCNC: 9.8 MG/DL (ref 8.7–10.6)
CHLORIDE SERPL-SCNC: 105 MMOL/L (ref 98–112)
CHOLEST SERPL-MCNC: 173 MG/DL (ref ?–200)
CO2 SERPL-SCNC: 30 MMOL/L (ref 21–32)
CREAT BLD-MCNC: 0.78 MG/DL
EGFRCR SERPLBLD CKD-EPI 2021: 76 ML/MIN/1.73M2 (ref 60–?)
EOSINOPHIL # BLD AUTO: 0.2 X10(3) UL (ref 0–0.7)
EOSINOPHIL NFR BLD AUTO: 3.9 %
ERYTHROCYTE [DISTWIDTH] IN BLOOD BY AUTOMATED COUNT: 13.3 %
FASTING PATIENT LIPID ANSWER: YES
FASTING STATUS PATIENT QL REPORTED: YES
GLOBULIN PLAS-MCNC: 2.7 G/DL (ref 2–3.5)
GLUCOSE BLD-MCNC: 89 MG/DL (ref 70–99)
HCT VFR BLD AUTO: 40.6 %
HDLC SERPL-MCNC: 73 MG/DL (ref 40–59)
HGB BLD-MCNC: 13.1 G/DL
IMM GRANULOCYTES # BLD AUTO: 0.01 X10(3) UL (ref 0–1)
IMM GRANULOCYTES NFR BLD: 0.2 %
LDLC SERPL CALC-MCNC: 87 MG/DL (ref ?–100)
LYMPHOCYTES # BLD AUTO: 1.5 X10(3) UL (ref 1–4)
LYMPHOCYTES NFR BLD AUTO: 29.3 %
MCH RBC QN AUTO: 31.3 PG (ref 26–34)
MCHC RBC AUTO-ENTMCNC: 32.3 G/DL (ref 31–37)
MCV RBC AUTO: 97.1 FL
MONOCYTES # BLD AUTO: 0.49 X10(3) UL (ref 0.1–1)
MONOCYTES NFR BLD AUTO: 9.6 %
NEUTROPHILS # BLD AUTO: 2.85 X10 (3) UL (ref 1.5–7.7)
NEUTROPHILS # BLD AUTO: 2.85 X10(3) UL (ref 1.5–7.7)
NEUTROPHILS NFR BLD AUTO: 55.6 %
NONHDLC SERPL-MCNC: 100 MG/DL (ref ?–130)
OSMOLALITY SERPL CALC.SUM OF ELEC: 293 MOSM/KG (ref 275–295)
PLATELET # BLD AUTO: 337 10(3)UL (ref 150–450)
POTASSIUM SERPL-SCNC: 4.2 MMOL/L (ref 3.5–5.1)
PROT SERPL-MCNC: 7.2 G/DL (ref 5.7–8.2)
RBC # BLD AUTO: 4.18 X10(6)UL
SODIUM SERPL-SCNC: 142 MMOL/L (ref 136–145)
TRIGL SERPL-MCNC: 70 MG/DL (ref 30–149)
VLDLC SERPL CALC-MCNC: 11 MG/DL (ref 0–30)
WBC # BLD AUTO: 5.1 X10(3) UL (ref 4–11)

## 2025-03-06 PROCEDURE — 85025 COMPLETE CBC W/AUTO DIFF WBC: CPT

## 2025-03-06 PROCEDURE — 36415 COLL VENOUS BLD VENIPUNCTURE: CPT

## 2025-03-06 PROCEDURE — 80061 LIPID PANEL: CPT

## 2025-03-06 PROCEDURE — 80053 COMPREHEN METABOLIC PANEL: CPT

## 2025-03-13 ENCOUNTER — OFFICE VISIT (OUTPATIENT)
Dept: INTERNAL MEDICINE CLINIC | Facility: CLINIC | Age: 80
End: 2025-03-13
Payer: MEDICARE

## 2025-03-13 VITALS
SYSTOLIC BLOOD PRESSURE: 134 MMHG | HEIGHT: 61 IN | RESPIRATION RATE: 18 BRPM | WEIGHT: 117 LBS | TEMPERATURE: 97 F | OXYGEN SATURATION: 100 % | DIASTOLIC BLOOD PRESSURE: 74 MMHG | BODY MASS INDEX: 22.09 KG/M2 | HEART RATE: 94 BPM

## 2025-03-13 DIAGNOSIS — E03.8 SUBCLINICAL HYPOTHYROIDISM: ICD-10-CM

## 2025-03-13 DIAGNOSIS — M05.79 RHEUMATOID ARTHRITIS INVOLVING MULTIPLE SITES WITH POSITIVE RHEUMATOID FACTOR (HCC): ICD-10-CM

## 2025-03-13 DIAGNOSIS — E55.9 VITAMIN D DEFICIENCY: ICD-10-CM

## 2025-03-13 DIAGNOSIS — Z00.00 ENCOUNTER FOR ANNUAL HEALTH EXAMINATION: Primary | ICD-10-CM

## 2025-03-13 DIAGNOSIS — I10 ESSENTIAL HYPERTENSION: ICD-10-CM

## 2025-03-13 PROCEDURE — G0439 PPPS, SUBSEQ VISIT: HCPCS | Performed by: FAMILY MEDICINE

## 2025-03-13 NOTE — PROGRESS NOTES
Subjective:   Ally Mei is a 80 year old female who presents for a Medicare Subsequent Annual Wellness visit (Pt already had Initial Annual Wellness) and scheduled follow up of multiple significant but stable problems.   1. Encounter for annual health examination (Primary)- doing well. Continues to work two jobs and this helps her stay active. No recent falls.   2. Essential hypertension- taking BP med as prescribed with no issues.   3. Subclinical hypothyroidism- no symptoms.   4. Vitamin D deficiency- taking vitamin D.   5. Rheumatoid arthritis involving multiple sites with positive rheumatoid factor (HCC)- no symptoms. Does not see Rheum.     History/Other:   Fall Risk Assessment:   She has been screened for Falls and is low risk.      Cognitive Assessment:   Abnormal  What day of the week is this?: Correct  What month is it?: Correct  What year is it?: Correct  Recall \"Ball\": Correct  Recall \"Flag\": Correct  Recall \"Tree\": Incorrect    Functional Ability/Status:   Ally Mei has some abnormal functions as listed below:  She has Vision problems based on screening of functional status.       Depression Screening (PHQ):  PHQ-2 SCORE: 0  , done 3/13/2025             Advanced Directives:   She does have a Living Will but we do NOT have it on file in Epic.    She does have a POA but we do NOT have it on file in [employer].    Patient has Advance Care Planning documents but we do not have a copy in EMR. Discussed Advanced Care Planning with patient and instructed patient to get our office a copy to be scanned into EMR.      Patient Active Problem List   Diagnosis    Osteoporosis    Vitamin D deficiency    Gastroesophageal reflux disease without esophagitis    Osteoarthritis of both hands, unspecified osteoarthritis type    Rheumatoid arthritis involving multiple sites with positive rheumatoid factor (HCC)    Protein-calorie malnutrition, unspecified severity (HCC)    Incomplete uterovaginal prolapse     Cystocele, midline    Rectocele    Stress incontinence    Vaginal atrophy    Porokeratosis    Detrusor instability    Incomplete emptying of bladder     Allergies:  She is allergic to bisphosphonates.    Current Medications:  Outpatient Medications Marked as Taking for the 3/13/25 encounter (Office Visit) with Isabel Mauricio, DO   Medication Sig    Desonide 0.05 % External Lotion Apply 1 Application topically 2 (two) times daily.    TRIAMCINOLONE 0.1 % External Cream Apply to areas of hair loss on scalp twice a day    meclizine 25 MG Oral Tab Take 1 tablet (25 mg total) by mouth 3 (three) times daily as needed.    raloxifene 60 MG Oral Tab Take 1 tablet (60 mg total) by mouth daily.    lisinopril 10 MG Oral Tab Take 1 tablet (10 mg total) by mouth daily.    estradiol (ESTRACE) 0.1 MG/GM Vaginal Cream Apply 1 gram vaginally 3 times per week.    omeprazole 20 MG Oral Capsule Delayed Release TAKE ONE CAPSULE BY MOUTH EVERY MORNING    mupirocin 2 % External Ointment Apply 1 Application topically 2 (two) times daily.    triamcinolone 0.1 % External Ointment Apply 1 Application topically 2 (two) times daily.    Multiple Vitamins-Minerals (CENTRUM) Oral Tab Take 1 tablet by mouth daily.       Medical History:  She  has a past medical history of Arthritis (), Esophageal reflux (), Osteoarthritis, Osteoporosis, Skin disorder, Unspecified essential hypertension, and Vertigo.  Surgical History:  She  has a past surgical history that includes removal of ovarian cyst(s); colonoscopy; tonsillectomy;  (,.,,.); and other surgical history ().   Family History:  Her family history includes Acid reflux in an other family member; Diabetes in her father; Diabetes, type 2 in her father; Heart Disease in her mother; Heart Disorder in her mother; Lung cancer in her mother.  Social History:  She  reports that she has never smoked. She has never used smokeless tobacco. She reports current alcohol use  of about 7.0 standard drinks of alcohol per week. She reports that she does not use drugs.    Tobacco:  She has never smoked tobacco.    CAGE Alcohol Screen:   CAGE screening score of 0 on 3/13/2025, showing low risk of alcohol abuse.      Patient Care Team:  Isabel Mauricio DO as PCP - General  Pepe Hung MD as Consulting Physician (NEUROLOGY)  Julissa Gramajo PT as Physical Therapist  Yue Charles PT as Physical Therapist    Review of Systems  GENERAL: feels well otherwise  SKIN: denies any unusual skin lesions  EYES: denies blurred vision or double vision  HEENT: denies nasal congestion, sinus pain or ST  LUNGS: denies shortness of breath with exertion  CARDIOVASCULAR: denies chest pain on exertion  GI: denies abdominal pain, denies heartburn  : denies dysuria, vaginal discharge or itching, no complaint of urinary incontinence   MUSCULOSKELETAL: denies back pain  NEURO: denies headaches  PSYCHE: denies depression or anxiety  HEMATOLOGIC: denies hx of anemia  ENDOCRINE: denies thyroid history  ALL/ASTHMA: denies hx of allergy or asthma    Objective:   Physical Exam  General Appearance:  Alert, cooperative, no distress, appears stated age   Head:  Normocephalic, without obvious abnormality, atraumatic   Eyes:  PERRL, conjunctiva/corneas clear, EOM's intact both eyes   Ears:  Normal TM's and external ear canals, both ears   Nose: Nares normal, septum midline,mucosa normal, no drainage or sinus tenderness   Throat: Lips, mucosa, and tongue normal; teeth and gums normal   Neck: Supple, symmetrical, trachea midline, no adenopathy;  thyroid: not enlarged, symmetric, no tenderness/mass/nodules; no carotid bruit or JVD   Back:   Symmetric, no curvature, ROM normal, no CVA tenderness   Lungs:   Clear to auscultation bilaterally, respirations unlabored   Heart:  Regular rate and rhythm, S1 and S2 normal, no murmur, rub, or gallop       Pelvic: Deferred   Extremities: Extremities normal, atraumatic, no  cyanosis or edema   Pulses: 2+ and symmetric   Skin: Skin color, texture, turgor normal, no rashes or lesions   Lymph nodes: Cervical, supraclavicular, and axillary nodes normal   Neurologic: Normal       /74 (BP Location: Left arm, Patient Position: Sitting, Cuff Size: adult)   Pulse 94   Temp 96.8 °F (36 °C) (Temporal)   Resp 18   Ht 5' 1\" (1.549 m)   Wt 117 lb (53.1 kg)   SpO2 100%   BMI 22.11 kg/m²  Estimated body mass index is 22.11 kg/m² as calculated from the following:    Height as of this encounter: 5' 1\" (1.549 m).    Weight as of this encounter: 117 lb (53.1 kg).    Medicare Hearing Assessment:   Hearing Screening    Time taken: 3/13/2025  9:24 AM  Entry User: Shira Veras MA  Screening Method: Finger Rub  Finger Rub Result: Pass         Visual Acuity:   Right Eye Visual Acuity: Corrected Right Eye Chart Acuity: 20/100   Left Eye Visual Acuity: Corrected Left Eye Chart Acuity: 20/100   Both Eyes Visual Acuity: Corrected Both Eyes Chart Acuity: 20/100   Able To Tolerate Visual Acuity: Yes        Assessment & Plan:   Ally Mei is a 80 year old female who presents for a Medicare Assessment.     1. Encounter for annual health examination (Primary)  2. Essential hypertension  3. Subclinical hypothyroidism  4. Vitamin D deficiency  5. Rheumatoid arthritis involving multiple sites with positive rheumatoid factor (HCC)  The patient indicates understanding of these issues and agrees to the plan.  Reinforced healthy diet, lifestyle, and exercise.  1. Encounter for annual health examination (Primary)- Reviewed age-appropriate preventive health and safety recommendations with patient. Reviewed lab results. Encouraged regular exercise and healthy eating.   2. Essential hypertension- BP controlled on med. Continue.   3. Subclinical hypothyroidism- not checked. Will monitor for symptoms and check if such, otherwise check in one year.   4. Vitamin D deficiency- continue vitamin D. Check  level in one year.   5. Rheumatoid arthritis involving multiple sites with positive rheumatoid factor (HCC)- offered Rheum and patient declined. Monitor for symptoms.     No follow-ups on file.     Isabel Mauricio DO, 3/13/2025     Supplementary Documentation:   General Health:  In the past six months, have you lost more than 10 pounds without trying?: 2 - No  Has your appetite been poor?: No  Type of Diet: Balanced  How does the patient maintain a good energy level?: Appropriate Exercise, Daily Walks, Stretching  How would you describe your daily physical activity?: Moderate  How would you describe your current health state?: Good  How do you maintain positive mental well-being?: Puzzles, Games, Visiting Friends, Visiting Family  On a scale of 0 to 10, with 0 being no pain and 10 being severe pain, what is your pain level?: 0 - (None)  In the past six months, have you experienced urine leakage?: 0-No  At any time do you feel concerned for the safety/well-being of yourself and/or your children, in your home or elsewhere?: No  Have you had any immunizations at another office such as Influenza, Hepatitis B, Tetanus, or Pneumococcal?: No    Health Maintenance   Topic Date Due    Colorectal Cancer Screening  02/08/2019    COVID-19 Vaccine (5 - 2024-25 season) 09/01/2024    Annual Depression Screening  01/01/2025    Fall Risk Screening (Annual)  01/01/2025    Annual Physical  03/11/2025    Influenza Vaccine  Completed    DEXA Scan  Completed    Pneumococcal Vaccine: 50+ Years  Completed    Zoster Vaccines  Completed    Meningococcal B Vaccine  Aged Out

## 2025-04-10 RX ORDER — DESONIDE 0.5 MG/ML
1 LOTION TOPICAL 2 TIMES DAILY
Qty: 118 ML | Refills: 0 | Status: SHIPPED | OUTPATIENT
Start: 2025-04-10

## 2025-04-10 NOTE — TELEPHONE ENCOUNTER
Please Review. Protocol Failed; No Protocol     Requested Prescriptions   Pending Prescriptions Disp Refills    DESONIDE 0.05 % External Lotion [Pharmacy Med Name: Desonide 0.05 % Lot Acta] 118 mL 0     Sig: Apply 1 Application topically 2 (two) times daily.       There is no refill protocol information for this order

## 2025-04-15 ENCOUNTER — OFFICE VISIT (OUTPATIENT)
Dept: UROLOGY | Facility: CLINIC | Age: 80
End: 2025-04-15
Attending: OBSTETRICS & GYNECOLOGY
Payer: MEDICARE

## 2025-04-15 VITALS — HEIGHT: 61 IN | BODY MASS INDEX: 22 KG/M2 | RESPIRATION RATE: 16 BRPM | TEMPERATURE: 97 F

## 2025-04-15 DIAGNOSIS — N95.2 POST-MENOPAUSAL ATROPHIC VAGINITIS: ICD-10-CM

## 2025-04-15 DIAGNOSIS — N81.2 INCOMPLETE UTEROVAGINAL PROLAPSE: Primary | ICD-10-CM

## 2025-04-15 DIAGNOSIS — N81.84 PELVIC MUSCLE WASTING: ICD-10-CM

## 2025-04-15 DIAGNOSIS — R33.9 INCOMPLETE EMPTYING OF BLADDER: ICD-10-CM

## 2025-04-15 PROCEDURE — 99212 OFFICE O/P EST SF 10 MIN: CPT

## 2025-04-15 NOTE — PROGRESS NOTES
Pt presents to follow up bulge  Doing well with #4 ring with support pessary, office care  Reports pessary is comfortable   Denies discharge  Denies bleeding  Denies s/sx of UTI  Bowels regular   Pt is using vaginal estrogen cream 3x weekly    Happy with pessary, feels supported  She is not currently interested in surgical management of her pelvic organ prolapse.    Urogynecology Summary:  Urogynecology Summary  Prolapse: No  KAREN: No  Urge Incontinence: No  Nocturia Frequency: 1  Frequency: 2 - 3 hours  Incomplete emptying: No  Constipation: No  Wears pad day?: 1  Wears Pad Night?: 1  Activities are limited by UI/POP?: No  Currently Sexually Active: No      Vitals:  Vitals:    04/15/25 0945   Resp: 16   Temp: 97 °F (36.1 °C)       GENERAL EXAM:  GENERAL:  NAD  HEAD: NC/AT  EYES: symmetric bilaterally. No icterus. Sclera w/o injection  NECK: no masses  LUNGS:  Normal resp effort  ABDOMEN:  Soft, no mass  MUSK: normal gait, ROM wnl. No edema  PSYCH: A&Ox3. Recent and remote memory intact    PELVIC EXAM: TRUMAN Silva, present for exam as a chaperone  Ext. Gen: +atrophy, no lesions  Urethra: +atrophy, nontender,   Bladder:+fullness, nontender  Vagina: +atrophy, no lesions  Cervix: no bleeding, no lesions, nontender  Uterus: +mobile     PELVIC SUPPORT:  Robeline: 3  Anterior: 3  Posterior: 3    Her pessary was removed, cleaned, and reinserted w/o difficulty    Impression/Plan:    ICD-10-CM    1. Incomplete uterovaginal prolapse  N81.2       2. Incomplete emptying of bladder  R33.9       3. Pelvic muscle wasting  N81.84       4. Post-menopausal atrophic vaginitis  N95.2             Discussion Items:   Discussed mgmt of vulvovaginal atrophy with vaginal estrogen cream. Reviewed associated benefits, risks, alternatives, and goals. Recommend low dose 2-3x weekly mgmt   Discussed management of pelvic organ prolapse including but not limited to behavioral modifications, conservative options, and surgical management.   Discussed  pessary management including benefits and risks. Discussed importance of keeping regularly scheduled pessary checks in prevention of complications related to pessary use.     Continue pessary management, office care  Continue vag estrogen 2-3x weekly  Daily pelvic exercises  Bowel management reviewed  Call with s/sx of UTI, problems with pessary   All questions answered  Pt understands and agrees to plan       Return in about 3 months (around 7/15/2025) for pessary care, sooner prn .      Holly Manzano PA-C      The 21st Century Cures Act makes medical notes like these available to patients in the interest of transparency. However, be advised this is a medical document. It is intended as peer to peer communication. It is written in medical language and may contain abbreviations or verbiage that are unfamiliar. It may appear blunt or direct. Medical documents are intended to carry relevant information, facts as evident, and the clinical opinion of the practitioner.

## 2025-04-25 ENCOUNTER — APPOINTMENT (OUTPATIENT)
Dept: CT IMAGING | Facility: HOSPITAL | Age: 80
End: 2025-04-25
Attending: EMERGENCY MEDICINE
Payer: MEDICARE

## 2025-04-25 ENCOUNTER — HOSPITAL ENCOUNTER (EMERGENCY)
Facility: HOSPITAL | Age: 80
Discharge: HOME | End: 2025-04-25
Attending: EMERGENCY MEDICINE
Payer: MEDICARE

## 2025-04-25 ENCOUNTER — APPOINTMENT (OUTPATIENT)
Dept: GENERAL RADIOLOGY | Facility: HOSPITAL | Age: 80
End: 2025-04-25
Attending: EMERGENCY MEDICINE
Payer: MEDICARE

## 2025-04-25 VITALS
RESPIRATION RATE: 12 BRPM | HEART RATE: 88 BPM | HEIGHT: 62 IN | SYSTOLIC BLOOD PRESSURE: 142 MMHG | TEMPERATURE: 98 F | OXYGEN SATURATION: 97 % | DIASTOLIC BLOOD PRESSURE: 88 MMHG | BODY MASS INDEX: 21.51 KG/M2 | WEIGHT: 116.88 LBS

## 2025-04-25 DIAGNOSIS — S62.512A CLOSED DISPLACED FRACTURE OF PROXIMAL PHALANX OF LEFT THUMB, INITIAL ENCOUNTER: ICD-10-CM

## 2025-04-25 DIAGNOSIS — S09.90XA INJURY OF HEAD, INITIAL ENCOUNTER: ICD-10-CM

## 2025-04-25 DIAGNOSIS — S01.01XA SCALP LACERATION, INITIAL ENCOUNTER: Primary | ICD-10-CM

## 2025-04-25 PROCEDURE — 26720 TREAT FINGER FRACTURE EACH: CPT

## 2025-04-25 PROCEDURE — 12001 RPR S/N/AX/GEN/TRNK 2.5CM/<: CPT

## 2025-04-25 PROCEDURE — 70450 CT HEAD/BRAIN W/O DYE: CPT | Performed by: EMERGENCY MEDICINE

## 2025-04-25 PROCEDURE — 99284 EMERGENCY DEPT VISIT MOD MDM: CPT

## 2025-04-25 PROCEDURE — S0119 ONDANSETRON 4 MG: HCPCS | Performed by: EMERGENCY MEDICINE

## 2025-04-25 PROCEDURE — 73130 X-RAY EXAM OF HAND: CPT | Performed by: EMERGENCY MEDICINE

## 2025-04-25 RX ORDER — ONDANSETRON 4 MG/1
4 TABLET, ORALLY DISINTEGRATING ORAL ONCE
Status: COMPLETED | OUTPATIENT
Start: 2025-04-25 | End: 2025-04-25

## 2025-04-25 NOTE — ED PROVIDER NOTES
Patient Seen in: [facility] Emergency Department      History     Chief Complaint   Patient presents with    Fall     Stated Complaint:     Subjective:   HPI    80-year-old female arrives via EMS after a fall with head injury.  She states she tripped over her own foot at work and fell backwards and struck her head on the floor.  Denies loss of consciousness.  Arrives with a c-collar in place but denies any neck pain.  Immunization records show previous tetanus in 2019.  She reports some mild pain to the back of the head.  She said she thought she would just go on working after the fall but was bleeding so they sent her to the emergency room.  Denies blood thinners.  History of Present Illness               Objective:     Past Medical History:    Arthritis    Esophageal reflux    Osteoarthritis    Osteoporosis    Skin disorder    Unspecified essential hypertension    Vertigo              Past Surgical History:   Procedure Laterality Date    Colonoscopy        ,.,,.    Other surgical history  1965    CYSTS ON OVARIES    Removal of ovarian cyst(s)      Tonsillectomy                  Social History     Socioeconomic History    Marital status:    Tobacco Use    Smoking status: Never    Smokeless tobacco: Never   Vaping Use    Vaping status: Never Used   Substance and Sexual Activity    Alcohol use: Yes     Alcohol/week: 7.0 standard drinks of alcohol     Types: 7 Cans of beer per week     Comment: beer at bedtime    Drug use: No   Other Topics Concern    Caffeine Concern Yes    Stress Concern No    Weight Concern No    Special Diet No    Exercise Yes    Seat Belt Yes     Social Drivers of Health     Food Insecurity: No Food Insecurity (3/13/2025)    NCSS - Food Insecurity     Worried About Running Out of Food in the Last Year: No     Ran Out of Food in the Last Year: No   Transportation Needs: No Transportation Needs (3/13/2025)    NCSS - Transportation     Lack of  Transportation: No   Housing Stability: Not At Risk (3/13/2025)    NCSS - Housing/Utilities     Has Housing: Yes     Worried About Losing Housing: No     Unable to Get Utilities: No                                Physical Exam     ED Triage Vitals [04/25/25 1209]   BP (!) 164/99   Pulse 89   Resp 16   Temp 97.9 °F (36.6 °C)   Temp src Oral   SpO2 98 %   O2 Device None (Room air)       Current Vitals:   Vital Signs  BP: (!) 158/97  Pulse: 80  Resp: 16  Temp: 97.9 °F (36.6 °C)  Temp src: Oral    Oxygen Therapy  SpO2: 100 %  O2 Device: None (Room air)  Pulse Oximetry Type: Intermittent        Physical Exam  General:  Vitals as listed.  No acute distress   HEENT: Palpable hematoma to the right occipital scalp.  No Fagan sign.  No palpable skull fracture.  There is bleeding from the area.  5 mm laceration to the right occipital scalp.  Low-grade venous oozing of blood.  Neck: C-collar initially in placed.  Clinically cleared from the c-collar.  No pain with full range of motion.  No tenderness on palpation of the bony processes of the cervical spine.  Lungs: good air exchange and clear   Heart: regular rate rhythm and no murmur   Abdomen: Soft and nontender  Extremities: Ecchymosis of the left hand that is most pronounced in the area of the first MCP joint.  Pain with range of motion of this area.  Hands chronically have deformities related to arthritis.  No other obvious musculoskeletal injuries.  Neuro: Alert oriented and nonfocal   Skin: no rashes or nodules  Physical Exam                ED Course   Labs Reviewed - No data to display       Results            XR HAND (MIN 3 VIEWS), LEFT (CPT=73130)  Result Date: 4/25/2025  PROCEDURE:  XR HAND (MIN 3 VIEWS), LEFT (CPT=73130)  TECHNIQUE:  Three views of the left hand were obtained.  COMPARISON:  None.  INDICATIONS:  fall. ecchymosis, hand pain  PATIENT STATED HISTORY: (As transcribed by Technologist)  Patient states fell at work. Pain, bruising, and swelling to left  hand , specifically 1st digit.    FINDINGS:  Mildly displaced comminuted intra-articular fracture at the base of the proximal phalanx of the left thumb.  Moderate to severe osteoarthritic changes in the 1st CMC joint.  Chondrocalcinosis in the TFCC.  Scattered mild osteoarthritic changes  in the interphalangeal joints.  Osteopenia.            CONCLUSION:  Mildly displaced comminuted intra-articular fracture at the base of the proximal phalanx of the left thumb.    LOCATION:  [id number]   Dictated by (CST): Reese Adorno MD on 4/25/2025 at 3:32 PM     Finalized by (CST): Reese Adorno MD on 4/25/2025 at 3:33 PM       CT BRAIN OR HEAD (CPT=70450)  Result Date: 4/25/2025  PROCEDURE:  CT BRAIN OR HEAD (10204)  COMPARISON:  [facility] , CT, CT BRAIN OR HEAD (41968), 1/08/2023, 1:24 PM.  INDICATIONS:  fall, head injury/pain  TECHNIQUE:  Noncontrast CT scanning is performed through the brain. Dose reduction techniques were used. Dose information is transmitted to the ACR (American College of Radiology) NRDR (National Radiology Data Registry) which includes the Dose Index Registry.  PATIENT STATED HISTORY: (As transcribed by Technologist)  Patient fell and hit back of head. Patient denies syncope   FINDINGS: Stable mild global brain parenchymal volume loss without overt hydrocephalus.  There is no midline shift or mass-effect.  The basal cisterns are patent.  The gray-white matter differentiation is intact.  There is no acute intracranial hemorrhage or extra-axial fluid collection.  No evidence of acute territorial infarction.  Stable mild-to-moderate chronic microvascular ischemic changes in the cerebral white matter.  There is no evident fracture.  The visualized paranasal sinuses and mastoid air cells are unremarkable.  Incomplete fusion of the posterior arch of C1 is considered a normal variant.             CONCLUSION:  No acute intracranial abnormality identified.  Stable chronic ischemic changes as above. If there is  clinical concern for acute ischemia/infarction, an MRI of the brain would be recommended for further evaluation.    LOCATION:  [id number]   Dictated by (CST): Reese Adorno MD on 4/25/2025 at 3:07 PM     Finalized by (CST): Reese Adorno MD on 4/25/2025 at 3:09 PM       Laceration repair:  The wound was cleansed and irrigated copiously.  There was no visible foreign body or bone  within the wound. The wound was closed using a simple closure with staples.  The quality of the closure was excellent.  1 staple used for closure                       MDM      80-year-old female presents with a head injury.  Reports tripping over her feet and falling backwards and striking her head.  Clinically cleared from the c-collar    Additional history obtained by patient's son at the bedside who reports that there is no abnormal behavior    Differential includes but is not limited to head contusion, intracranial hemorrhage, hematoma, laceration, intracranial hemorrhage, a life/function threat.    CT head ordered for further evaluation.    My independent interpretation of CT of the head is that there is no large acute intracranial hemorrhage.  Also reviewed x-ray of the hand which shows a fracture at the base of the left thumb.  Placed in a Velcro hand splint with thumb spica.  Provided orthopedic clinic information for follow-up.  Also provided occupational health information given that this was a injury that occurred at work.  Single staple in occipital scalp to be removed in 1 week.  To return with any concerns.    A Velcro wrist splint with thumb spica was placed to immobilize the fracture and facilitate healing.  Patient neurovascularly intact after placement of splint.                  Medical Decision Making      Disposition and Plan     Clinical Impression:  1. Scalp laceration, initial encounter    2. Injury of head, initial encounter    3. Closed displaced fracture of proximal phalanx of left thumb, initial encounter          Disposition:  Discharge  4/25/2025  3:59 pm    Follow-up:  Isabel Mauricio DO  [address], [address]  [place] [address]  [phone]    Schedule an appointment as soon as possible for a visit      Alex Gaines MD  [address] [place] [address]  [address]  [facility] [address] [phone]    Follow up  Orthopedic specialist for further evaluation of the fracture to your [facility] Emergency Department  [address] [facility] [address] [phone]  Follow up in 1 week(s)  For staple removal    Fady Fan MD  [address] [place] [address]  [address]  [facility] [address] [phone]    Follow up  This is our occupational health clinic for further evaluation of injury at work          Medications Prescribed:  Current Discharge Medication List          Supplementary Documentation:

## 2025-04-25 NOTE — ED INITIAL ASSESSMENT (HPI)
Pt presents to ED via ambulance from a trip/fall while at work. Pt fell backwards and hit her head on the floor. Denies loc. Per ems, there was blood noted on back of her head, bleeding controlled. C-collar applied to patient by medics pta. Pt denies blood thinner use.

## 2025-04-28 ENCOUNTER — OFFICE VISIT (OUTPATIENT)
Dept: FAMILY MEDICINE CLINIC | Facility: CLINIC | Age: 80
End: 2025-04-28
Payer: MEDICARE

## 2025-04-28 ENCOUNTER — TELEPHONE (OUTPATIENT)
Dept: ORTHOPEDICS CLINIC | Facility: CLINIC | Age: 80
End: 2025-04-28

## 2025-04-28 ENCOUNTER — PATIENT OUTREACH (OUTPATIENT)
Dept: CASE MANAGEMENT | Age: 80
End: 2025-04-28

## 2025-04-28 VITALS
BODY MASS INDEX: 20.24 KG/M2 | RESPIRATION RATE: 16 BRPM | OXYGEN SATURATION: 98 % | SYSTOLIC BLOOD PRESSURE: 142 MMHG | DIASTOLIC BLOOD PRESSURE: 98 MMHG | HEIGHT: 62 IN | TEMPERATURE: 99 F | HEART RATE: 95 BPM | WEIGHT: 110 LBS

## 2025-04-28 DIAGNOSIS — Z76.89 RETURN TO WORK EXAM: Primary | ICD-10-CM

## 2025-04-28 DIAGNOSIS — S62.515D CLOSED NONDISPLACED FRACTURE OF PROXIMAL PHALANX OF LEFT THUMB WITH ROUTINE HEALING, SUBSEQUENT ENCOUNTER: ICD-10-CM

## 2025-04-28 DIAGNOSIS — S01.01XD LACERATION OF SCALP, SUBSEQUENT ENCOUNTER: ICD-10-CM

## 2025-04-28 NOTE — PROGRESS NOTES
Transitions of Care Navigation  Discharge Date: 25  Contact Date: 2025    Transitions of Care Assessment:  BRYAN Initial Assessment    General:  Assessment completed with: Patient  Patient Subjective: I'm feeling okay. I got a note today from the Count includes the [facility] care today so that I could go back to work. She checked me out and everything was fine.  Chief Complaint: scalp laceration, fall  Verify patient name and  with patient/ caregiver: Yes    Hospital Stay/Discharge:  Tell me what you understand of why you were in the hospital or emergency department: fell  Prior to leaving the hospital were your Discharge Instructions reviewed with you?: Yes  Did you receive a copy of your written Discharge Instructions?: Yes  What questions do you have about your Discharge Instructions?: none  Do you feel better or worse since you left the hospital or emergency department?: Better    Follow - Up Appointment:  Do you have a follow-up appointment?: Yes  Date: 25  Physician: Ortho  Are there any barriers to getting to your follow-up appointment?: No    Home Health/DME:  Prior to leaving the hospital was Home Health (HH) arranged for you?: No     Prior to leaving the hospital or emergency department was Durable Medical Equipment (DME), medical supplies, or infusions arranged for you?: No  Are DME/medical supply/infusions needs identified by staff during this assessment?: No     Medications/Diet:  Did any of your medications change, during or after your hospital stay or ED visit?: No  Do you understand what your medications are for and possible side effects?: Yes  Are there any reasons that keep you from taking your medication as prescribed?: No  Any concerns about medication refills?: No    Were you given a different diet per your Discharge Instructions?: No     Questions/Concerns:  Do you have any questions or concerns that have not been discussed?: No       Nursing Interventions: NCM reviewed discharge instructions  and when to seek medical attention with the patient. She states that she is feeling fine. She states that she needed a return to work note in order for her to go back to her job so she went to [facility] care and got one. She states that the provider assessed her and everything was good including her vitals. She denied having any s/s of infection and states that she is getting the staples removed this Friday. She denied having any pain. She denied having any fever, n/v/c/d, sob, lightheadedness, HA or any new or worsening symptoms. Med review completed. She denied having any questions or concerns at this time.     Medications:  Medication Reconciliation:  I am aware of an inpatient discharge within the last 30 days.  The discharge medication list has been reconciled with the patient's current medication list and reviewed by me. See medication list for additions of new medication, and changes to current doses of medications and discontinued medications.  Current Medications[1]      Follow-up Appointments:  Your appointments       Date & Time Appointment Department ([place])    Apr 29, 2025 9:20 AM CDT Exam - Established with Ferdinand Goode,  [address] ([address])        May 06, 2025 2:15 PM CDT Exam - Established with Isabel Mauricio, DO [facility], ???, [place] ([facility])        Jul 17, 2025 9:40 AM CDT Uro Follow Up Pre/Post Op with Holly Manzano, PACynthiaC [facility] (--)              [address]  [address] [place] [id number] [phone] [facility], ???, [place]  [facility]  [address]  [facility] [id number] [phone] [facility]  [address]  [facility]  [id number]  [phone]            Transitional Care Clinic  Was TCC Ordered: No      Primary Care Provider (If no TCC appointment)  Does patient already have a PCP appointment scheduled? Yes  Nurse Care Manager Confirmed PCP office ER Follow-up appointment with patient    Specialist  Does the patient have any other follow-up appointment(s) need to be scheduled? Yes   -If yes: Nurse Care Manager reviewed upcoming specialist appointments with patient: Yes   -Does the patient need assistance scheduling appointment(s): No, pt has appt with Ortho tomorrow and is having staples removed this Friday.     Book By Date: 5/2/25         [1]   Current Outpatient Medications   Medication Sig Dispense Refill    Mometasone Furoate 0.1 % External Cream Apply 1 Application topically 2 (two) times daily. Apply a thin film to the affected area(s). 45 g 1    Desonide 0.05 % External Lotion Apply 1 Application topically 2 (two) times daily. 118 mL 0    TRIAMCINOLONE 0.1 % External Cream Apply to areas of hair loss on scalp twice a day 80 g 0    meclizine 25 MG Oral Tab Take 1 tablet (25 mg total) by mouth 3 (three) times daily as needed. 90 tablet 0    raloxifene 60 MG Oral Tab Take 1 tablet (60 mg total) by mouth daily. 90 tablet 3    lisinopril 10 MG Oral Tab Take 1 tablet (10 mg total) by mouth daily. 90 tablet 3    estradiol (ESTRACE) 0.1 MG/GM Vaginal Cream Apply 1 gram vaginally 3 times per week. 42.5 g 3    omeprazole 20 MG Oral Capsule Delayed Release TAKE ONE CAPSULE BY MOUTH EVERY MORNING 90 capsule 3    mupirocin 2 % External Ointment Apply 1 Application topically 2 (two) times daily. 30 g 2    triamcinolone 0.1 % External Ointment Apply 1 Application topically 2 (two) times daily. 30 g 2    Multiple Vitamins-Minerals (CENTRUM) Oral Tab Take 1 tablet by mouth in the morning.

## 2025-04-28 NOTE — PROGRESS NOTES
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  Tripped and fell backwards while at work on 04/25/25, EMS transported to ED.  (+) scalp laceration and nondisplaed fx of proximal phalanx L Thumb.   Wound repaired with single staple, wrist splint in place  Pt denies HA, no LOC, no n/v/d, no vision changes, no paresthesias/weakness, no AMS.   CT head reported to pt as negative.   Pt was given contact information for f/u with ortho and occupational medicine.  She has attempted to contact both departments and is waiting for return call.     She is currently employed at [employer] as , and resumed work yesterday without issue.  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      No other concerns.       Current Medications[1]   Past Medical History[2]   Past Surgical History[3]      Short Social Hx on File[4]      REVIEW OF SYSTEMS:   GENERAL: normal appetite  SKIN: no rashes or abnormal skin lesions  HEENT: See HPI  LUNGS: See HPI  CARDIOVASCULAR: denies chest pain or palpitations   GI: denies N/V/C or abdominal pain      EXAM:   BP (!) 142/98   Pulse 95   Temp 99 °F (37.2 °C)   Resp 16   Ht 5' 2\" (1.575 m)   Wt 110 lb (49.9 kg)   SpO2 98%   BMI 20.12 kg/m²   GENERAL: well developed, well nourished,in no apparent distress  SKIN: no rashes,no suspicious lesions  HEAD: (+) 1 cm laceration posterior scalp, c/d/I with single staple, no hematoma, no erythema/edema, no drainage.   EYES: conjunctiva clear, EOM intact  EARS: TM's clear bilaterally  NOSE: Nostrils patent, clear nasal discharge, nasal mucosa pink/moist   THROAT: Oral mucosa pink, moist. Posterior  pharynx is non erythematous. without exudates, no hypertrophy.   NECK: Supple, non-tender, FAROM cervical spine, no spinous process ttp  LUNGS: clear to auscultation bilaterally, no wheezes or rhonchi. Breathing is non labored.  CARDIO: RRR without murmur  EXTREMITIES: (+) Kyhosis.  Splint in place L hand, contusion noted at tip of L thumb, sensation in intact, cap refill <2 sec, skin warm/dry without erythema.   FAROM RUE, ellen LE .  No ttp over spinous process, no cyanosis, clubbing or edema  LYMPH:  no lymphadenopathy.        ASSESSMENT AND PLAN:   Ally Mei is a 80 year old female who presents with upper respiratory symptoms that are consistent with    ASSESSMENT:   Encounter Diagnoses   Name Primary?    Return to work exam Yes    Laceration of scalp, subsequent encounter     Closed nondisplaced fracture of proximal phalanx of left thumb with routine healing, subsequent encounter        PLAN:   Pt in process of scheduling f/u appt as directed by ED.  Reports has left several messages for orthopedic, occupational med, PCP and is awaiting return call to schedule appt.   She has resumed work as  without issue, denies post concussive sx.   Work release note with limited duty return, she states employers are accommodating and she can modify her duties as needed.  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     Advised return for staple removal as directed. Continue wound care.       Manisha Mon PA-C         [1]   Current Outpatient Medications   Medication Sig Dispense Refill    Mometasone Furoate 0.1 % External Cream Apply 1 Application topically 2 (two) times daily. Apply a thin film to the affected area(s). 45 g 1    Desonide 0.05 % External Lotion Apply 1 Application topically 2 (two) times daily. 118 mL 0    TRIAMCINOLONE 0.1 % External Cream Apply to areas of hair loss on scalp twice a day 80 g 0    meclizine 25 MG Oral  Tab Take 1 tablet (25 mg total) by mouth 3 (three) times daily as needed. 90 tablet 0    raloxifene 60 MG Oral Tab Take 1 tablet (60 mg total) by mouth daily. 90 tablet 3    lisinopril 10 MG Oral Tab Take 1 tablet (10 mg total) by mouth daily. 90 tablet 3    estradiol (ESTRACE) 0.1 MG/GM Vaginal Cream Apply 1 gram vaginally 3 times per week. 42.5 g 3    omeprazole 20 MG Oral Capsule Delayed Release TAKE ONE CAPSULE BY MOUTH EVERY MORNING 90 capsule 3    mupirocin 2 % External Ointment Apply 1 Application topically 2 (two) times daily. 30 g 2    triamcinolone 0.1 % External Ointment Apply 1 Application topically 2 (two) times daily. 30 g 2    Multiple Vitamins-Minerals (CENTRUM) Oral Tab Take 1 tablet by mouth in the morning.     [2]   Past Medical History:   Arthritis    Esophageal reflux    Osteoarthritis    Osteoporosis    Skin disorder    Unspecified essential hypertension    Vertigo   [3]   Past Surgical History:  Procedure Laterality Date    Colonoscopy        ,.,,.    Other surgical history  1965    CYSTS ON OVARIES    Removal of ovarian cyst(s)      Tonsillectomy     [4]   Social History  Socioeconomic History    Marital status:    Tobacco Use    Smoking status: Never    Smokeless tobacco: Never   Vaping Use    Vaping status: Never Used   Substance and Sexual Activity    Alcohol use: Yes     Alcohol/week: 7.0 standard drinks of alcohol     Types: 7 Cans of beer per week     Comment: beer at bedtime    Drug use: No   Other Topics Concern    Caffeine Concern Yes    Stress Concern No    Weight Concern No    Special Diet No    Exercise Yes    Seat Belt Yes     Social Drivers of Health     Food Insecurity: No Food Insecurity (3/13/2025)    NCSS - Food Insecurity     Worried About Running Out of Food in the Last Year: No     Ran Out of Food in the Last Year: No   Transportation Needs: No Transportation Needs (3/13/2025)    NCSS - Transportation     Lack of Transportation: No    Housing Stability: Not At Risk (3/13/2025)    NCSS - Housing/Utilities     Has Housing: Yes     Worried About Losing Housing: No     Unable to Get Utilities: No

## 2025-04-28 NOTE — TELEPHONE ENCOUNTER
Patient was seen in the ED on 4/25 for a left thumb fx. Xrays in Epic. Please advise when she can be seen.  [phone] (home)

## 2025-04-28 NOTE — TELEPHONE ENCOUNTER
Seen in ED on 04/25/2025. Images in EMR and report below~ Please advise on what can be offered?      Narrative   PROCEDURE:  XR HAND (MIN 3 VIEWS), LEFT (CPT=73130)     TECHNIQUE:  Three views of the left hand were obtained.     COMPARISON:  None.     INDICATIONS:  fall. ecchymosis, hand pain     PATIENT STATED HISTORY: (As transcribed by Technologist)  Patient states fell at work. Pain, bruising, and swelling to left hand , specifically 1st digit.         FINDINGS:  Mildly displaced comminuted intra-articular fracture at the base of the proximal phalanx of the left thumb.  Moderate to severe osteoarthritic changes in the 1st CMC joint.  Chondrocalcinosis in the TFCC.  Scattered mild osteoarthritic changes   in the interphalangeal joints.  Osteopenia.                   Impression   CONCLUSION:  Mildly displaced comminuted intra-articular fracture at the base of the proximal phalanx of the left thumb.          LOCATION:  [id number]

## 2025-04-28 NOTE — TELEPHONE ENCOUNTER
Patient called back, scheduled for tomorrow  Future Appointments   Date Time Provider Department Center   4/29/2025  9:20 AM Ferdinand Goode DO [facility] [address]   5/6/2025  2:15 PM Isabel Mauricio DO [facility] [place] [facility]   7/17/2025  9:40 AM Holly Manzano, PAWILLIAN [facility]

## 2025-04-29 ENCOUNTER — OFFICE VISIT (OUTPATIENT)
Facility: CLINIC | Age: 80
End: 2025-04-29
Payer: MEDICARE

## 2025-04-29 VITALS — BODY MASS INDEX: 20.24 KG/M2 | HEIGHT: 62 IN | WEIGHT: 110 LBS

## 2025-04-29 DIAGNOSIS — M18.12 PRIMARY OSTEOARTHRITIS OF FIRST CARPOMETACARPAL JOINT OF LEFT HAND: ICD-10-CM

## 2025-04-29 DIAGNOSIS — S62.515A CLOSED NONDISPLACED FRACTURE OF PROXIMAL PHALANX OF LEFT THUMB, INITIAL ENCOUNTER: Primary | ICD-10-CM

## 2025-04-29 DIAGNOSIS — M85.80 OSTEOPENIA DETERMINED BY X-RAY: ICD-10-CM

## 2025-04-29 PROCEDURE — 99204 OFFICE O/P NEW MOD 45 MIN: CPT | Performed by: FAMILY MEDICINE

## 2025-04-29 NOTE — H&P
Sports Medicine Clinic Note    Subjective:    Chief Complaint: Left thumb pain    Date of Injury: 4/25/2025    History: 80-year-old RHD female presents following a fall at work resulting in a mildly displaced comminuted intra-articular fracture at the base of the proximal phalanx of the left thumb. She also sustained a scalp laceration requiring two staples. She has been compliant with wearing a cast but finds it cumbersome for hygiene purposes. No numbness or sensory deficits reported. She recalls managing a prior finger fracture with a removable brace that allowed for easier hygiene. She is seeking similar management for her current injury to allow better hand washing while maintaining immobilization.    Objective:    Left Thumb Examination:    Inspection: No signs of skin breakdown or swelling noted.  Palpation: Tenderness localized to the base of the left thumb.  Range of Motion: Deferred  Neurovascular: Neurovascularly intact to the digits with normal capillary refill and preserved light touch sensation.  Special Tests: No instability.    Diagnostic Tests:    Radiographs of the left hand personally reviewed demonstrating a mildly displaced comminuted intra-articular fracture at the base of the proximal phalanx of the left thumb. Moderate to severe osteoarthritic changes in the first CMC joint, chondrocalcinosis in the TFCC, scattered mild osteoarthritic changes in the interphalangeal joints, and osteopenia are also noted.    Assessment:    Mildly displaced comminuted intra-articular fracture of the base of the proximal phalanx of the left thumb  Moderate to severe osteoarthritis of the left first CMC joint  Osteopenia    Plan:    Therapy: Occupational therapy may be considered if persistent functional deficits after fracture healing.  Medications: NSAIDs and Acetaminophen as needed for pain control.  Bracing/Casting: Thumb spica brace to allow for improved hygiene while maintaining immobilization. Emphasized  importance of wearing the brace full-time.  Procedures: None performed today.  Activity Recommendations: No lifting, gripping, or strenuous hand activities with the affected hand. Light activities as tolerated with the brace on.    Follow-Up: Tentatively scheduled in 2 weeks for repeat x-rays and reassessment of fracture stability.      Ferdinand Goode DO, KANWAL   Primary Care Sports Medicine

## 2025-05-02 ENCOUNTER — PATIENT OUTREACH (OUTPATIENT)
Dept: CASE MANAGEMENT | Age: 80
End: 2025-05-02

## 2025-05-02 ENCOUNTER — HOSPITAL ENCOUNTER (EMERGENCY)
Facility: HOSPITAL | Age: 80
Discharge: HOME | End: 2025-05-02
Attending: EMERGENCY MEDICINE
Payer: OTHER MISCELLANEOUS

## 2025-05-02 VITALS
RESPIRATION RATE: 18 BRPM | TEMPERATURE: 98 F | OXYGEN SATURATION: 99 % | SYSTOLIC BLOOD PRESSURE: 144 MMHG | DIASTOLIC BLOOD PRESSURE: 88 MMHG | HEART RATE: 86 BPM

## 2025-05-02 DIAGNOSIS — Z48.02 ENCOUNTER FOR STAPLE REMOVAL: Primary | ICD-10-CM

## 2025-05-02 NOTE — ED PROVIDER NOTES
Patient Seen in: [facility] Emergency Department      History     Chief Complaint   Patient presents with    Sut Stap RingRemoval     Stated Complaint: staple removal    Subjective:   HPI    80-year-old female presents today for suture removal.  Patient suffered a fall 1 week ago and had a single staple placed in the posterior scalp.  She has had no issues since discharge.    History of Present Illness               Objective:     Past Medical History:    Arthritis    Esophageal reflux    Osteoarthritis    Osteoporosis    Skin disorder    Unspecified essential hypertension    Vertigo              Past Surgical History:   Procedure Laterality Date    Colonoscopy        ,.,,.    Other surgical history  1965    CYSTS ON OVARIES    Removal of ovarian cyst(s)      Tonsillectomy                  Social History     Socioeconomic History    Marital status:    Tobacco Use    Smoking status: Never    Smokeless tobacco: Never   Vaping Use    Vaping status: Never Used   Substance and Sexual Activity    Alcohol use: Yes     Alcohol/week: 7.0 standard drinks of alcohol     Types: 7 Cans of beer per week     Comment: beer at bedtime    Drug use: No   Other Topics Concern    Caffeine Concern Yes    Stress Concern No    Weight Concern No    Special Diet No    Exercise Yes    Seat Belt Yes     Social Drivers of Health     Food Insecurity: No Food Insecurity (3/13/2025)    NCSS - Food Insecurity     Worried About Running Out of Food in the Last Year: No     Ran Out of Food in the Last Year: No   Transportation Needs: No Transportation Needs (3/13/2025)    NCSS - Transportation     Lack of Transportation: No   Housing Stability: Not At Risk (3/13/2025)    NCSS - Housing/Utilities     Has Housing: Yes     Worried About Losing Housing: No     Unable to Get Utilities: No                                Physical Exam     ED Triage Vitals   BP 25 1041 144/88   Pulse 25 1041 86   Resp  05/02/25 1041 18   Temp 05/02/25 1052 98.1 °F (36.7 °C)   Temp src 05/02/25 1052 Oral   SpO2 05/02/25 1041 99 %   O2 Device 05/02/25 1041 None (Room air)       Current Vitals:   Vital Signs  BP: 144/88  Pulse: 86  Resp: 18  Temp: 98.1 °F (36.7 °C)  Temp src: Oral  MAP (mmHg): (!) 104    Oxygen Therapy  SpO2: 99 %  O2 Device: None (Room air)        Physical Exam  Vitals and nursing note reviewed.   Constitutional:       Appearance: Normal appearance.   HENT:      Head: Normocephalic.      Comments: Well-healed laceration in the right posterior parietal scalp     Nose: Nose normal.      Mouth/Throat:      Mouth: Mucous membranes are moist.   Eyes:      Extraocular Movements: Extraocular movements intact.   Cardiovascular:      Rate and Rhythm: Normal rate.   Pulmonary:      Effort: Pulmonary effort is normal.   Abdominal:      General: Abdomen is flat.   Musculoskeletal:         General: Normal range of motion.   Skin:     General: Skin is warm.   Neurological:      General: No focal deficit present.      Mental Status: She is alert.   Psychiatric:         Mood and Affect: Mood normal.     Physical Exam                ED Course   Labs Reviewed - No data to display       Results                                 MDM      80-year-old female presents today for staple removal.  Patient's laceration appears well-healed and noninfected.  1 staple removed without issue, will discharge.        Medical Decision Making      Disposition and Plan     Clinical Impression:  1. Encounter for staple removal         Disposition:  Discharge  5/2/2025 10:54 am    Follow-up:  No follow-up provider specified.        Medications Prescribed:  Current Discharge Medication List          Supplementary Documentation:

## 2025-05-02 NOTE — PROGRESS NOTES
Transitions of Care Navigation  Discharge Date: 25  Contact Date: 2025    Transitions of Care Assessment:  BRYAN Initial Assessment    General:  Assessment completed with: Patient  Patient Subjective: Pt feeling better, since ER discharge--scalp laceration healing well, without pain, redness, drainage or bleeding, left thumb in thumb splint, as directed, appetite good, staying hydrated, pt has already returned to work (cleared at 25 [facility] appt).  Pt denies fever, chills, headache, vision changes, dizziness, nausea, vomiting, diarrhea, bleeding, irregular heartbeat or fast pulse, loss of vision, speech or strength or coordination in any body part, chest pain or shortness of breath at this time.  Chief Complaint: Disposition and Plan     Clinical Impression:  1. Encounter for staple removal  Verify patient name and  with patient/ caregiver: Yes    Hospital Stay/Discharge:  Tell me what you understand of why you were in the hospital or emergency department: Presents for staple removal from head lac  Prior to leaving the hospital were your Discharge Instructions reviewed with you?: Yes  Did you receive a copy of your written Discharge Instructions?: Yes  What questions do you have about your Discharge Instructions?: none  Do you feel better or worse since you left the hospital or emergency department?: Better    Follow - Up Appointment:  Do you have a follow-up appointment?: Yes  Date: 25  Physician: Dr. Mauricio  Are there any barriers to getting to your follow-up appointment?: No    Home Health/DME:  Prior to leaving the hospital was Home Health (HH) arranged for you?: No     Prior to leaving the hospital or emergency department was Durable Medical Equipment (DME), medical supplies, or infusions arranged for you?: No  Are DME/medical supply/infusions needs identified by staff during this assessment?: No     Medications/Diet:  Did any of your medications change, during or after your hospital stay or  ED visit?: No  Do you understand what your medications are for and possible side effects?: Yes  Are there any reasons that keep you from taking your medication as prescribed?: No  Any concerns about medication refills?: No    Were you given a different diet per your Discharge Instructions?: No  Reason: n/a     Questions/Concerns:  Do you have any questions or concerns that have not been discussed?: No     BRYAN Follow-up Assessment    General:  Assessment completed with: Patient  Patient Subjective: Pt feeling better, since ER discharge--scalp laceration healing well, without pain, redness, drainage or bleeding, left thumb in thumb splint, as directed, appetite good, staying hydrated, pt has already returned to work (cleared at 4/28/25 [facility] appt).  Pt denies fever, chills, headache, vision changes, dizziness, nausea, vomiting, diarrhea, bleeding, irregular heartbeat or fast pulse, loss of vision, speech or strength or coordination in any body part, chest pain or shortness of breath at this time.  Chief Complaint: Disposition and Plan     Clinical Impression:  1. Encounter for staple removal  Community Resources: Other (none)    Progress/Care Plan:  Is the patient progressing as planned?: Yes  Frequency/Follow Up Plan: Patient has met goals, no further outreach needed.     Nursing Interventions: Discussed diet, activity, medications, home care and need for f/u visits. Pt confirms 5/06/25 appt with PCP (pt may need to change appt, d/t her work schedule) and 2 week ortho f/u 5/13/25, as pt did see ortho initially 4/29/25. Patient has met goals, no further outreach needed. Patient aware when to contact PCP/specialists and when to seek emergency care. No further questions/concerns at this time.    Medications:  Medication Reconciliation:  I am aware of an inpatient discharge within the last 30 days.  The discharge medication list has been reconciled with the patient's current medication list and reviewed by me. See  medication list for additions of new medication, and changes to current doses of medications and discontinued medications.  Current Outpatient Medications   Medication Sig Dispense Refill    Mometasone Furoate 0.1 % External Cream Apply 1 Application topically 2 (two) times daily. Apply a thin film to the affected area(s). 45 g 1    Desonide 0.05 % External Lotion Apply 1 Application topically 2 (two) times daily. 118 mL 0    meclizine 25 MG Oral Tab Take 1 tablet (25 mg total) by mouth 3 (three) times daily as needed. 90 tablet 0    raloxifene 60 MG Oral Tab Take 1 tablet (60 mg total) by mouth daily. 90 tablet 3    lisinopril 10 MG Oral Tab Take 1 tablet (10 mg total) by mouth daily. 90 tablet 3    estradiol (ESTRACE) 0.1 MG/GM Vaginal Cream Apply 1 gram vaginally 3 times per week. 42.5 g 3    omeprazole 20 MG Oral Capsule Delayed Release TAKE ONE CAPSULE BY MOUTH EVERY MORNING 90 capsule 3    mupirocin 2 % External Ointment Apply 1 Application topically 2 (two) times daily. 30 g 2    Multiple Vitamins-Minerals (CENTRUM) Oral Tab Take 1 tablet by mouth in the morning.         Follow-up Appointments:  Your appointments       Date & Time Appointment Department (Center)    May 06, 2025 2:15 PM CDT Exam - Established with Isabel Mauricio, DO [facility], ???, [place] ([facility])        May 13, 2025 9:20 AM CDT Hospital Follow Up with Ferdinand Goode,  [address] ([address])        Jul 17, 2025 9:40 AM CDT Uro Follow Up Pre/Post Op with Holly Manzano PA-C [facility] (--)              [address]  [address] [address] [place] [id number] [phone] [facility], ???, [place]  [facility]  [address] [facility] , [facility] [address]  [facility]  [address]  [phone] [facility]  [address] [address]  [phone]            Transitional Care Clinic  Was TCC Ordered: No    Primary Care Provider (If no TCC appointment)  Does patient already have a PCP appointment scheduled? Yes  Nurse Care Manager Confirmed PCP office ER Follow-up appointment with patient    Specialist  Does the patient have any other follow-up appointment(s) need to be scheduled? No     Book By Date: 5/09/2025

## 2025-05-07 RX ORDER — DESONIDE 0.5 MG/ML
1 LOTION TOPICAL 2 TIMES DAILY
Qty: 118 ML | Refills: 0 | Status: SHIPPED | OUTPATIENT
Start: 2025-05-07

## 2025-05-12 DIAGNOSIS — S62.515A CLOSED NONDISPLACED FRACTURE OF PROXIMAL PHALANX OF LEFT THUMB, INITIAL ENCOUNTER: Primary | ICD-10-CM

## 2025-05-13 ENCOUNTER — OFFICE VISIT (OUTPATIENT)
Facility: CLINIC | Age: 80
End: 2025-05-13
Payer: MEDICARE

## 2025-05-13 ENCOUNTER — HOSPITAL ENCOUNTER (OUTPATIENT)
Dept: GENERAL RADIOLOGY | Age: 80
Discharge: HOME | End: 2025-05-13
Attending: FAMILY MEDICINE

## 2025-05-13 VITALS — HEIGHT: 62 IN | BODY MASS INDEX: 20.24 KG/M2 | WEIGHT: 110 LBS

## 2025-05-13 DIAGNOSIS — S62.515A CLOSED NONDISPLACED FRACTURE OF PROXIMAL PHALANX OF LEFT THUMB, INITIAL ENCOUNTER: ICD-10-CM

## 2025-05-13 DIAGNOSIS — M05.79 RHEUMATOID ARTHRITIS INVOLVING MULTIPLE SITES WITH POSITIVE RHEUMATOID FACTOR (HCC): ICD-10-CM

## 2025-05-13 DIAGNOSIS — S62.515D CLOSED NONDISPLACED FRACTURE OF PROXIMAL PHALANX OF LEFT THUMB WITH ROUTINE HEALING, SUBSEQUENT ENCOUNTER: ICD-10-CM

## 2025-05-13 DIAGNOSIS — M18.12 PRIMARY OSTEOARTHRITIS OF FIRST CARPOMETACARPAL JOINT OF LEFT HAND: ICD-10-CM

## 2025-05-13 DIAGNOSIS — M85.80 OSTEOPENIA DETERMINED BY X-RAY: Primary | ICD-10-CM

## 2025-05-13 PROCEDURE — 99214 OFFICE O/P EST MOD 30 MIN: CPT | Performed by: FAMILY MEDICINE

## 2025-05-13 PROCEDURE — 73140 X-RAY EXAM OF FINGER(S): CPT | Performed by: FAMILY MEDICINE

## 2025-05-13 NOTE — PROGRESS NOTES
Sports Medicine Clinic Note    Subjective:    Chief Complaint: Left thumb follow up    Date of Injury: 4/25/2025    History: 80-year-old right hand dominant female with a history of rheumatoid arthritis presents for follow-up of a left thumb fracture sustained on 4/25/2025. She reports minimal pain, rating it 0/10, and no issues with thumb movement. She has been wearing a brace consistently during activities and finds it particularly helpful for protection at work. She lives alone and prefers to minimize in-person visits due to transportation difficulties. No nocturnal brace use unless she sleeps restlessly.    Objective:    Left Thumb Examination:    Inspection: No erythema, swelling, or skin breakdown observed.  Palpation: Very mild tenderness at the base of the thumb.  Range of Motion: Active thumb motion preserved with minimal discomfort.  Neurovascular: Intact sensation and capillary refill to the digit.  Special Tests: No instability noted.    Diagnostic Tests:    Recent radiographs of the left thumb demonstrate a comminuted fracture at the base of the proximal phalanx with slight increased callus formation suggestive of healing. Osteoarthritic changes are noted throughout the visualized left hand, including interphalangeal joint space narrowing and degenerative changes at the first carpometacarpal joint.    Assessment:    Healing mildly displaced comminuted intra-articular fracture of the base of the proximal phalanx of the left thumb  Moderate to severe osteoarthritis of the left first CMC joint  Rheumatoid arthritis with functional hand limitations    Plan:    Additional Workup: None at this time.  Therapy: Hand therapy to be considered if functional limitations persist after completion of fracture healing.  Medications: Continue NSAIDs and Acetaminophen as needed for discomfort.  Bracing/Casting: Continue use of thumb spica brace during activities for at least 2 more weeks. May remove while at  rest.  Procedures: None performed today.  Activity Recommendations: Avoid gripping, lifting, or strenuous hand use. Resume light activities with the brace on. No brace needed at night unless at risk for trauma during sleep.    Follow-Up: Tentatively scheduled in 2 weeks regarding symptoms and function to determine need for therapy. Can repeat x-rays at that time.      Ferdinand Goode DO, [facility]   Primary Care Sports Medicine   Length To Time In Minutes Device Was In Place: 10

## 2025-05-27 ENCOUNTER — OFFICE VISIT (OUTPATIENT)
Age: 80
End: 2025-05-27
Payer: MEDICARE

## 2025-05-27 VITALS
HEART RATE: 90 BPM | BODY MASS INDEX: 20.68 KG/M2 | HEIGHT: 62 IN | SYSTOLIC BLOOD PRESSURE: 102 MMHG | OXYGEN SATURATION: 99 % | TEMPERATURE: 99 F | DIASTOLIC BLOOD PRESSURE: 66 MMHG | RESPIRATION RATE: 16 BRPM | WEIGHT: 112.38 LBS

## 2025-05-27 DIAGNOSIS — S09.90XD INJURY OF HEAD, SUBSEQUENT ENCOUNTER: ICD-10-CM

## 2025-05-27 DIAGNOSIS — S62.515D CLOSED NONDISPLACED FRACTURE OF PROXIMAL PHALANX OF LEFT THUMB WITH ROUTINE HEALING, SUBSEQUENT ENCOUNTER: ICD-10-CM

## 2025-05-27 DIAGNOSIS — S01.01XD LACERATION OF SCALP, SUBSEQUENT ENCOUNTER: Primary | ICD-10-CM

## 2025-05-27 PROBLEM — E46 PROTEIN-CALORIE MALNUTRITION, UNSPECIFIED SEVERITY (HCC): Status: RESOLVED | Noted: 2023-02-13 | Resolved: 2025-05-27

## 2025-05-27 PROCEDURE — G2211 COMPLEX E/M VISIT ADD ON: HCPCS | Performed by: FAMILY MEDICINE

## 2025-05-27 PROCEDURE — 99214 OFFICE O/P EST MOD 30 MIN: CPT | Performed by: FAMILY MEDICINE

## 2025-05-27 NOTE — PROGRESS NOTES
Subjective:   Patient ID: Ally Mei is a 80 year old female.    HPI Here for f/u from ER on 4/25/25 and 5/2/25. Fell on 4/25/25 and hit the back of her head. Had one staple placed. Negative imaging. Also fractured her left thumb. Staple removed on 5/2/25. Seeing Ortho for her thumb.   Overall feels well. No headaches/dizziness. No pain in thumb.     History/Other:   Review of Systems   Musculoskeletal:  Positive for joint swelling. Negative for back pain.   Skin:  Negative for rash and wound.   Neurological:  Negative for weakness, numbness and headaches.     Current Medications[1]  Allergies:Allergies[2]    Objective:   Physical Exam  Vitals reviewed.   Constitutional:       Appearance: Normal appearance. She is well-developed.   HENT:      Head: Normocephalic and atraumatic. No laceration.   Pulmonary:      Effort: Pulmonary effort is normal.   Neurological:      Mental Status: She is alert.   Psychiatric:         Mood and Affect: Mood normal.         Behavior: Behavior normal.         Assessment & Plan:   1. Laceration of scalp, subsequent encounter    2. Injury of head, subsequent encounter    3. Closed nondisplaced fracture of proximal phalanx of left thumb with routine healing, subsequent encounter    1,2. Resolved. Reviewed ER notes, imaging. Note written to return to work.   3. Resolving. Continue f/u Ortho.     No orders of the defined types were placed in this encounter.      Meds This Visit:  Requested Prescriptions      No prescriptions requested or ordered in this encounter       Imaging & Referrals:  None         [1]   Current Outpatient Medications   Medication Sig Dispense Refill    Desonide 0.05 % External Lotion Apply 1 Application topically 2 (two) times daily. 118 mL 0    Mometasone Furoate 0.1 % External Cream Apply 1 Application topically 2 (two) times daily. Apply a thin film to the affected area(s). 45 g 1    meclizine 25 MG Oral Tab Take 1 tablet (25 mg total) by mouth 3 (three)  times daily as needed. 90 tablet 0    raloxifene 60 MG Oral Tab Take 1 tablet (60 mg total) by mouth daily. 90 tablet 3    lisinopril 10 MG Oral Tab Take 1 tablet (10 mg total) by mouth daily. 90 tablet 3    estradiol (ESTRACE) 0.1 MG/GM Vaginal Cream Apply 1 gram vaginally 3 times per week. 42.5 g 3    omeprazole 20 MG Oral Capsule Delayed Release TAKE ONE CAPSULE BY MOUTH EVERY MORNING 90 capsule 3    mupirocin 2 % External Ointment Apply 1 Application topically 2 (two) times daily. 30 g 2    Multiple Vitamins-Minerals (CENTRUM) Oral Tab Take 1 tablet by mouth in the morning.     [2]   Allergies  Allergen Reactions    Bisphosphonates OTHER (SEE COMMENTS)     Sig bone pain

## 2025-06-13 RX ORDER — MECLIZINE HYDROCHLORIDE 25 MG/1
25 TABLET ORAL 3 TIMES DAILY PRN
Qty: 90 TABLET | Refills: 1 | Status: SHIPPED | OUTPATIENT
Start: 2025-06-13

## 2025-06-13 NOTE — TELEPHONE ENCOUNTER
Refill passed per Clinic protocol.  Requested Prescriptions   Pending Prescriptions Disp Refills    MECLIZINE 25 MG Oral Tab [Pharmacy Med Name: Meclizine Hydrochloride 25 Mg Tab Zydu] 30 tablet 0     Sig: Take 1 tablet (25 mg total) by mouth 3 (three) times daily as needed.       Gastrointestional Medication Protocol Passed - 6/13/2025  3:17 PM        Passed - In person appointment or virtual visit in the past 12 mos or appointment in next 3 mos     Recent Outpatient Visits              2 weeks ago Laceration of scalp, subsequent encounter    [address]  [address] [place]. [address] Isabel Mauricio,     Office Visit    1 month ago Osteopenia determined by x-ray    [facility], [address] [place], [place] [address]Ferdinand grier DO    Office Visit    1 month ago Closed nondisplaced fracture of proximal phalanx of left thumb, initial encounter    [facility], [address] [place], [place] [address]Ferdinand DO    Office Visit    1 month ago Return to work exam    [facility], [facility], [address] [place] [address] Manisha Mon PA-C    Office Visit    1 month ago Incomplete uterovaginal prolapse    [facility] Holly Manzano PA-C    Office Visit          Future Appointments         Provider Department Appt Notes    In 1 month Holly Manzano PA-C [facility] 3 MONTH PESSARY CHECK    In 3 months Isabel Mauricio DO [address] [place] [address] 6 month f/u    In 4 months Oskar Lee MD [employer] annual                    Passed - Medication is active on med list

## 2025-07-01 RX ORDER — DESONIDE 0.5 MG/ML
1 LOTION TOPICAL 2 TIMES DAILY
Qty: 118 ML | Refills: 3 | Status: SHIPPED | OUTPATIENT
Start: 2025-07-01

## 2025-07-17 ENCOUNTER — OFFICE VISIT (OUTPATIENT)
Dept: UROLOGY | Facility: CLINIC | Age: 80
End: 2025-07-17
Attending: OBSTETRICS & GYNECOLOGY
Payer: MEDICARE

## 2025-07-17 VITALS — TEMPERATURE: 98 F | HEIGHT: 62 IN | BODY MASS INDEX: 21 KG/M2 | RESPIRATION RATE: 16 BRPM

## 2025-07-17 DIAGNOSIS — N81.84 PELVIC MUSCLE WASTING: ICD-10-CM

## 2025-07-17 DIAGNOSIS — N95.2 POST-MENOPAUSAL ATROPHIC VAGINITIS: ICD-10-CM

## 2025-07-17 DIAGNOSIS — N81.2 INCOMPLETE UTEROVAGINAL PROLAPSE: Primary | ICD-10-CM

## 2025-07-17 DIAGNOSIS — R33.9 INCOMPLETE EMPTYING OF BLADDER: ICD-10-CM

## 2025-07-17 PROCEDURE — 99212 OFFICE O/P EST SF 10 MIN: CPT

## 2025-07-17 NOTE — PROGRESS NOTES
Pt presents to follow up bulge  Doing well with #4 ring with support pessary, office care  Reports pessary is comfortable   Denies discharge  Denies bleeding  Denies s/sx of UTI  Bowels regular   Pt is using vaginal estrogen cream twice weekly    Happy with pessary, feels supported  She is not currently interested in surgical management of her pelvic organ prolapse.    Urogynecology Summary:  Urogynecology Summary  Prolapse: No  KAREN: No  Urge Incontinence: No  Nocturia Frequency: 2 (0-2)  Frequency: 2 - 3 hours  Incomplete emptying: No  Constipation: No  Wears pad day?: 1  Wears Pad Night?: 1  Activities are limited by UI/POP?: No  Currently Sexually Active: No      Vitals:  Vitals:    07/17/25 0941   Resp: 16   Temp: 97.8 °F (36.6 °C)       GENERAL EXAM:  GENERAL:  NAD  HEAD: NC/AT  EYES: symmetric bilaterally. No icterus. Sclera w/o injection  NECK: no masses  LUNGS:  Normal resp effort  ABDOMEN:  Soft, no mass  MUSK: normal gait, ROM wnl. No edema  PSYCH: A&Ox3. Recent and remote memory intact    PELVIC EXAM: TRUMAN Silva, present for exam as a chaperone  Ext. Gen: +atrophy, no lesions  Urethra: +atrophy, nontender,   Bladder:+fullness, nontender  Vagina: +atrophy, no lesions  Cervix: no bleeding, no lesions, nontender  Uterus: +mobile     PELVIC SUPPORT:  [place]: 3  Anterior: 3  Posterior: 3    Her pessary was removed, cleaned, and reinserted w/o difficulty    Impression/Plan:    ICD-10-CM    1. Incomplete uterovaginal prolapse  N81.2       2. Incomplete emptying of bladder  R33.9       3. Pelvic muscle wasting  N81.84       4. Post-menopausal atrophic vaginitis  N95.2             Discussion Items:   Discussed mgmt of vulvovaginal atrophy with vaginal estrogen cream. Reviewed associated benefits, risks, alternatives, and goals. Recommend low dose 2-3x weekly mgmt   Discussed management of pelvic organ prolapse including but not limited to behavioral modifications, conservative options, and surgical management.    Discussed pessary management including benefits and risks. Discussed importance of keeping regularly scheduled pessary checks in prevention of complications related to pessary use.      Continue pessary management, office care  Continue vag estrogen 2-3x weekly  Daily pelvic exercises  Bowel management reviewed  Call with s/sx of UTI, problems with pessary   All questions answered  Pt understands and agrees to plan      Return in about 3 months (around 10/17/2025) for pessary care, sooner prn .      Holly Manzano PA-C      The 21st Century Cures Act makes medical notes like these available to patients in the interest of transparency. However, be advised this is a medical document. It is intended as peer to peer communication. It is written in medical language and may contain abbreviations or verbiage that are unfamiliar. It may appear blunt or direct. Medical documents are intended to carry relevant information, facts as evident, and the clinical opinion of the practitioner.

## 2025-08-06 RX ORDER — MECLIZINE HYDROCHLORIDE 25 MG/1
25 TABLET ORAL 3 TIMES DAILY PRN
Qty: 90 TABLET | Refills: 0 | Status: SHIPPED | OUTPATIENT
Start: 2025-08-06

## 2025-08-06 RX ORDER — OMEPRAZOLE 20 MG/1
20 CAPSULE, DELAYED RELEASE ORAL EVERY MORNING
Qty: 90 CAPSULE | Refills: 3 | Status: SHIPPED | OUTPATIENT
Start: 2025-08-06

## 2025-08-13 DIAGNOSIS — I10 ESSENTIAL HYPERTENSION: ICD-10-CM

## 2025-08-15 RX ORDER — LISINOPRIL 10 MG/1
10 TABLET ORAL DAILY
Qty: 90 TABLET | Refills: 0 | OUTPATIENT
Start: 2025-08-15

## (undated) DIAGNOSIS — I10 ESSENTIAL HYPERTENSION: ICD-10-CM

## (undated) NOTE — LETTER
Date: 5/27/2025    Patient Name: Ally Mei          To Whom it may concern:    This letter has been written at the patient's request. The above patient was seen at [facility] for treatment of a medical condition.    This patient may return to work, effective immediately, with no restrictions.       Sincerely,      Isabel Mauricio DO

## (undated) NOTE — LETTER
Date & Time: 7/18/2019, 5:46 PM  Patient: Varun Pineda  Encounter Provider(s):    DO Jorge Medellin [place]       To Whom It May Concern:    Esa Jerome was seen and treated in our department on 7/18/2019.  She may return to

## (undated) NOTE — LETTER
Date & Time: 7/18/2019, 5:57 PM  Patient: Qasim Benites  Encounter Provider(s):    DO Guerita Bangura [place]       To Whom It May Concern:    William Brown was seen and treated in our department on 7/18/2019.  She should not ret

## (undated) NOTE — LETTER
Date: 4/28/2025    Patient Name: Ally Mei          To Whom it may concern:    This letter has been written at the patient's request. The above patient was seen at [facility] for treatment of a medical condition.    The patient may return to work/school on 4/29/25 with the following limitations: light activities as tolerated with continued avoidance of lifting/pushing/pulling in excess of 5 lbs, avoidance of strenuous activity, and avoid use of left upper extremity until medically cleared.         Sincerely,    Manisha Mon PA-C

## (undated) NOTE — ED AVS SNAPSHOT
Deedee Dietrich   MRN: [id number]    Department:  [facility] Emergency Department   Date of Visit:  4/11/2018           Disclosure     Insurance plans vary and the physician(s) referred by the ER may not be covered by your plan.  Please contact tell this physician (or your personal doctor if your instructions are to return to your personal doctor) about any new or lasting problems. The primary care or specialist physician will see patients referred from the [facility] Emergency Department.  Michela Jensen

## (undated) NOTE — LETTER
Date: 8/2/2021    Patient Name: Jj Castro          To Whom it may concern: This letter has been written at the patient's request. The above patient was seen at the [facility] for treatment of a medical condition.     This patient i

## (undated) NOTE — LETTER
Date: 7/18/2017    Patient Name: Jostin Keller          To Whom it may concern: This letter has been written at the patient's request.     This patient has a diagnosis of recurrent vertigo.  On the following occasions she was seen in the ER:    08/0

## (undated) NOTE — LETTER
Date & Time: 11/22/2019, 5:29 PM  Patient: Cameron Brooks  Encounter Provider(s):    Paolo Odonnell Attending  MD Jace Khalil [facility] [place]       To Whom It May Concern:    Dallas Pierre was seen and treated in our department on 11/22/201

## (undated) NOTE — ED AVS SNAPSHOT
[facility] Emergency Department    [place]  [address] [address]    Phone:  [phone]    Fax:  [phone]   MRN: [id number]    Department:  [facility] Emergency Department   Date of Visit:  1 IF THERE IS ANY CHANGE OR WORSENING OF YOUR CONDITION, CALL YOUR PRIMARY CARE PHYSICIAN AT ONCE OR RETURN IMMEDIATELY TO THE EMERGENCY DEPARTMENT.     If you have been prescribed any medication(s), please fill your prescription right away and begin taking t

## (undated) NOTE — ED AVS SNAPSHOT
Jostin Keller   MRN: [id number]    Department:  [facility] Emergency Department   Date of Visit:  7/18/2019           Disclosure     Insurance plans vary and the physician(s) referred by the ER may not be covered by your plan.  Please contact tell this physician (or your personal doctor if your instructions are to return to your personal doctor) about any new or lasting problems. The primary care or specialist physician will see patients referred from the [facility] Emergency Department.  Sam Leos

## (undated) NOTE — Clinical Note
Initial assessment completed with patient. Pt confirms 5/06/25 appt with you (pt may need to change appt, d/t her work schedule) and 2 week ortho f/u 5/13/25, as pt did see ortho initially 4/29/25. Patient has met goals, no further outreach needed.  Thank you!  Future Appointments 5/6/2025   2:15 PM    Isabel Mauricio, DO        [facility]        [facility] 5/13/2025  9:20 AM    Ferdinand Goode, DO        [facility]        [facility] [address] 7/17/2025  9:40 AM    Holly Manzano, PA-C        [facility]              None

## (undated) NOTE — LETTER
To Whom It May Concern:    Esha Perez is currently under my medical care. She has a 5 pound lifting restriction based on her medical condition. If you require additional information please contact our office. Sincerely,        Phillip Rutherford MD  [facility], [address] [place] [address] [phone]        Document generated by:   Phillip Rutherford MD

## (undated) NOTE — ED AVS SNAPSHOT
Israel Lewis   MRN: [id number]    Department:  [facility] Emergency Department   Date of Visit:  11/22/2019           Disclosure     Insurance plans vary and the physician(s) referred by the ER may not be covered by your plan.  Please contact tell this physician (or your personal doctor if your instructions are to return to your personal doctor) about any new or lasting problems. The primary care or specialist physician will see patients referred from the [facility] Emergency Department.  Axel Trotter

## (undated) NOTE — ED AVS SNAPSHOT
Girish Janus   MRN: [id number]    Department:  [facility] Emergency Department   Date of Visit:  5/7/2018           Disclosure     Insurance plans vary and the physician(s) referred by the ER may not be covered by your plan.  Please contact y tell this physician (or your personal doctor if your instructions are to return to your personal doctor) about any new or lasting problems. The primary care or specialist physician will see patients referred from the [facility] Emergency Department.  Danny Benedict